# Patient Record
Sex: FEMALE | Race: WHITE | NOT HISPANIC OR LATINO | Employment: OTHER | URBAN - METROPOLITAN AREA
[De-identification: names, ages, dates, MRNs, and addresses within clinical notes are randomized per-mention and may not be internally consistent; named-entity substitution may affect disease eponyms.]

---

## 2017-05-09 ENCOUNTER — TELEPHONE (OUTPATIENT)
Dept: FAMILY MEDICINE | Facility: OTHER | Age: 62
End: 2017-05-09

## 2017-05-09 NOTE — TELEPHONE ENCOUNTER
Summary:    Patient is due/failing the following:   FIT Test     Action needed:   Complete a FIT test     Type of outreach:    phone patient would like to complete FIT test in November     Questions for provider review:    None                                                                                                                                    Tiera Hirsch       Chart routed to Care Team .        Panel Management Review      Patient has the following on her problem list:     Hypertension   Last three blood pressure readings:  BP Readings from Last 3 Encounters:   11/08/16 126/70   05/25/16 110/80   10/27/15 126/74     Blood pressure: Passed    HTN Guidelines:  Age 18-59 BP range:  Less than 140/90  Age 60-85 with Diabetes:  Less than 140/90  Age 60-85 without Diabetes:  less than 150/90      Composite cancer screening  Chart review shows that this patient is due/due soon for the following Fecal Colorectal (FIT)

## 2017-08-08 DIAGNOSIS — E87.6 HYPOKALEMIA: Primary | ICD-10-CM

## 2017-08-08 NOTE — TELEPHONE ENCOUNTER
Potassium chloride      Last Written Prescription Date: 11/08/16  Last Fill Quantity: 180, # refills: 3  Last Office Visit with [facility], [facility] or [facility] prescribing provider: 11/08/16       Potassium   Date Value Ref Range Status   11/08/2016 3.6 3.4 - 5.3 mmol/L Final     Creatinine   Date Value Ref Range Status   11/08/2016 0.95 0.52 - 1.04 mg/dL Final     BP Readings from Last 3 Encounters:   11/08/16 126/70   05/25/16 110/80   10/27/15 126/74

## 2017-08-09 RX ORDER — POTASSIUM CHLORIDE 1500 MG/1
TABLET, EXTENDED RELEASE ORAL
Qty: 180 TABLET | Refills: 0 | Status: SHIPPED | OUTPATIENT
Start: 2017-08-09 | End: 2017-11-10

## 2017-09-25 ENCOUNTER — TELEPHONE (OUTPATIENT)
Dept: FAMILY MEDICINE | Facility: OTHER | Age: 62
End: 2017-09-25

## 2017-09-25 NOTE — TELEPHONE ENCOUNTER
Reason for Call:  Medication or medication refill:    Do you use a [facility]?  Name of the [facility]    Name of the medication requested: premarin    Other request: having hot flashes and night sweats and would to go back on this before her appt if possible    Can we leave a detailed message on this number? YES    Phone number patient can be reached at: Home number on file [phone] (home)    Best Time: any    Call taken on 9/25/2017 at 12:20 PM by Daja Rodarte

## 2017-09-25 NOTE — TELEPHONE ENCOUNTER
Left message for patient to call back. Please see message below.    Premarin not on med list or in historical medications. Would need to discuss with TC at appointment. Or ask provider who rx'd this for her.  Deepa Hamilton, CMA

## 2017-09-25 NOTE — TELEPHONE ENCOUNTER
Pt returned the call and reports that dr montes from the [facility] prescribed it for her in 2008.  0.625mg tab.

## 2017-09-26 ENCOUNTER — MYC MEDICAL ADVICE (OUTPATIENT)
Dept: OBGYN | Facility: OTHER | Age: 62
End: 2017-09-26

## 2017-09-26 DIAGNOSIS — N95.1 MENOPAUSAL SYNDROME (HOT FLASHES): Primary | ICD-10-CM

## 2017-09-26 NOTE — TELEPHONE ENCOUNTER
Per protocol pt needs f/u since it has been 11 years. [employer] message was sent to pt.   Kyra Barrera MA  September 26, 2017

## 2017-11-09 ENCOUNTER — MYC MEDICAL ADVICE (OUTPATIENT)
Dept: FAMILY MEDICINE | Facility: OTHER | Age: 62
End: 2017-11-09

## 2017-11-09 DIAGNOSIS — Z12.39 BREAST CANCER SCREENING: Primary | ICD-10-CM

## 2017-11-09 NOTE — TELEPHONE ENCOUNTER
Will route to covering providers to review/sign pended order for mammogram, if appropriate.     I will inform patient once this has been done.  Deepa Hamilton, CMA

## 2017-11-10 ENCOUNTER — TELEPHONE (OUTPATIENT)
Dept: FAMILY MEDICINE | Facility: OTHER | Age: 62
End: 2017-11-10

## 2017-11-10 ENCOUNTER — MYC MEDICAL ADVICE (OUTPATIENT)
Dept: FAMILY MEDICINE | Facility: OTHER | Age: 62
End: 2017-11-10

## 2017-11-10 DIAGNOSIS — E87.6 HYPOKALEMIA: ICD-10-CM

## 2017-11-10 DIAGNOSIS — I10 ESSENTIAL HYPERTENSION WITH GOAL BLOOD PRESSURE LESS THAN 140/90: ICD-10-CM

## 2017-11-10 DIAGNOSIS — N95.1 MENOPAUSAL SYNDROME (HOT FLASHES): ICD-10-CM

## 2017-11-14 RX ORDER — POTASSIUM CHLORIDE 1500 MG/1
TABLET, EXTENDED RELEASE ORAL
Qty: 60 TABLET | Refills: 0 | Status: SHIPPED | OUTPATIENT
Start: 2017-11-14 | End: 2017-11-15

## 2017-11-14 RX ORDER — ATENOLOL AND CHLORTHALIDONE TABLET 50; 25 MG/1; MG/1
TABLET ORAL
Qty: 30 TABLET | Refills: 0 | Status: SHIPPED | OUTPATIENT
Start: 2017-11-14 | End: 2017-11-15

## 2017-11-14 NOTE — TELEPHONE ENCOUNTER
Potassium & Atenolol:  Medication is being filled for 1 time refill only due to:  Patient needs to be seen because it has been more than one year since last visit.    Nathalia Dailey, RN, BSN

## 2017-11-15 ENCOUNTER — MYC MEDICAL ADVICE (OUTPATIENT)
Dept: FAMILY MEDICINE | Facility: OTHER | Age: 62
End: 2017-11-15

## 2017-11-15 DIAGNOSIS — E87.6 HYPOKALEMIA: ICD-10-CM

## 2017-11-15 DIAGNOSIS — I10 ESSENTIAL HYPERTENSION WITH GOAL BLOOD PRESSURE LESS THAN 140/90: ICD-10-CM

## 2017-11-15 RX ORDER — ATENOLOL AND CHLORTHALIDONE TABLET 50; 25 MG/1; MG/1
TABLET ORAL
Qty: 90 TABLET | Refills: 0 | Status: SHIPPED | OUTPATIENT
Start: 2017-11-15 | End: 2018-01-12

## 2017-11-15 RX ORDER — POTASSIUM CHLORIDE 1500 MG/1
20 TABLET, EXTENDED RELEASE ORAL 2 TIMES DAILY
Qty: 180 TABLET | Refills: 0 | Status: SHIPPED | OUTPATIENT
Start: 2017-11-15 | End: 2018-01-12

## 2017-11-15 NOTE — TELEPHONE ENCOUNTER
Patient was given 3 months worth of a refills, this will get her till her appointment. Ruth Navarro RN, BSN

## 2017-12-20 ENCOUNTER — MYC MEDICAL ADVICE (OUTPATIENT)
Dept: FAMILY MEDICINE | Facility: OTHER | Age: 62
End: 2017-12-20

## 2017-12-20 DIAGNOSIS — N95.1 MENOPAUSAL SYNDROME (HOT FLASHES): ICD-10-CM

## 2017-12-20 NOTE — TELEPHONE ENCOUNTER
Prescription approved per [facility] Refill Protocol.    Nathalia Dailey, RN, BSN

## 2018-01-12 ENCOUNTER — OFFICE VISIT (OUTPATIENT)
Dept: FAMILY MEDICINE | Facility: OTHER | Age: 63
End: 2018-01-12
Payer: COMMERCIAL

## 2018-01-12 VITALS
WEIGHT: 134 LBS | SYSTOLIC BLOOD PRESSURE: 138 MMHG | HEART RATE: 55 BPM | BODY MASS INDEX: 24.66 KG/M2 | DIASTOLIC BLOOD PRESSURE: 72 MMHG | OXYGEN SATURATION: 97 % | TEMPERATURE: 99 F | HEIGHT: 62 IN | RESPIRATION RATE: 16 BRPM

## 2018-01-12 DIAGNOSIS — Z12.11 SCREEN FOR COLON CANCER: ICD-10-CM

## 2018-01-12 DIAGNOSIS — N95.1 MENOPAUSAL SYNDROME (HOT FLASHES): ICD-10-CM

## 2018-01-12 DIAGNOSIS — M54.50 BILATERAL LOW BACK PAIN WITHOUT SCIATICA, UNSPECIFIED CHRONICITY: ICD-10-CM

## 2018-01-12 DIAGNOSIS — M77.01 MEDIAL EPICONDYLITIS OF ELBOW, RIGHT: ICD-10-CM

## 2018-01-12 DIAGNOSIS — I10 ESSENTIAL HYPERTENSION WITH GOAL BLOOD PRESSURE LESS THAN 140/90: ICD-10-CM

## 2018-01-12 DIAGNOSIS — Z00.00 ENCOUNTER FOR ROUTINE ADULT HEALTH EXAMINATION WITHOUT ABNORMAL FINDINGS: Primary | ICD-10-CM

## 2018-01-12 LAB
ANION GAP SERPL CALCULATED.3IONS-SCNC: 7 MMOL/L (ref 3–14)
BUN SERPL-MCNC: 20 MG/DL (ref 7–30)
CALCIUM SERPL-MCNC: 8.6 MG/DL (ref 8.5–10.1)
CHLORIDE SERPL-SCNC: 104 MMOL/L (ref 94–109)
CHOLEST SERPL-MCNC: 234 MG/DL
CO2 SERPL-SCNC: 27 MMOL/L (ref 20–32)
CREAT SERPL-MCNC: 0.75 MG/DL (ref 0.52–1.04)
GFR SERPL CREATININE-BSD FRML MDRD: 77 ML/MIN/1.7M2
GLUCOSE SERPL-MCNC: 83 MG/DL (ref 70–99)
HDLC SERPL-MCNC: 100 MG/DL
LDLC SERPL CALC-MCNC: 103 MG/DL
NONHDLC SERPL-MCNC: 134 MG/DL
POTASSIUM SERPL-SCNC: 3.5 MMOL/L (ref 3.4–5.3)
SODIUM SERPL-SCNC: 138 MMOL/L (ref 133–144)
TRIGL SERPL-MCNC: 156 MG/DL

## 2018-01-12 PROCEDURE — 99396 PREV VISIT EST AGE 40-64: CPT | Performed by: FAMILY MEDICINE

## 2018-01-12 PROCEDURE — 80048 BASIC METABOLIC PNL TOTAL CA: CPT | Performed by: FAMILY MEDICINE

## 2018-01-12 PROCEDURE — 36415 COLL VENOUS BLD VENIPUNCTURE: CPT | Performed by: FAMILY MEDICINE

## 2018-01-12 PROCEDURE — 80061 LIPID PANEL: CPT | Performed by: FAMILY MEDICINE

## 2018-01-12 PROCEDURE — 99213 OFFICE O/P EST LOW 20 MIN: CPT | Mod: 25 | Performed by: FAMILY MEDICINE

## 2018-01-12 RX ORDER — ESTRADIOL 0.5 MG/1
0.5 TABLET ORAL DAILY
Qty: 90 TABLET | Refills: 3 | Status: SHIPPED | OUTPATIENT
Start: 2018-01-12 | End: 2018-02-16

## 2018-01-12 RX ORDER — ATENOLOL AND CHLORTHALIDONE TABLET 50; 25 MG/1; MG/1
TABLET ORAL
Qty: 90 TABLET | Refills: 3 | Status: SHIPPED | OUTPATIENT
Start: 2018-01-12 | End: 2018-11-20

## 2018-01-12 RX ORDER — POTASSIUM CHLORIDE 1500 MG/1
20 TABLET, EXTENDED RELEASE ORAL 3 TIMES DAILY
Qty: 270 TABLET | Refills: 3 | Status: SHIPPED | OUTPATIENT
Start: 2018-01-12 | End: 2019-03-01

## 2018-01-12 ASSESSMENT — ENCOUNTER SYMPTOMS
BACK PAIN: 1
JOINT SWELLING: 0
HEMATOCHEZIA: 0
CONSTIPATION: 0
HEADACHES: 0
PARESTHESIAS: 0
COUGH: 0
MYALGIAS: 0
HEARTBURN: 0
DIZZINESS: 0
FEVER: 0
WEAKNESS: 0
PALPITATIONS: 0
ARTHRALGIAS: 1
SHORTNESS OF BREATH: 0
DYSURIA: 0
HEMATURIA: 0
NAUSEA: 0
FREQUENCY: 0
ABDOMINAL PAIN: 0
DIARRHEA: 0
NERVOUS/ANXIOUS: 0
SORE THROAT: 0
EYE PAIN: 0
CHILLS: 0

## 2018-01-12 NOTE — PROGRESS NOTES
SUBJECTIVE:   CC: Christiana Lopes is an 62 year old woman who presents for preventive health visit.     Physical   Annual:     Getting at least 3 servings of Calcium per day::  Yes    Bi-annual eye exam::  NO    Dental care twice a year::  Yes    Sleep apnea or symptoms of sleep apnea::  None    Diet::  Regular (no restrictions)    Frequency of exercise::  2-3 days/week    Duration of exercise::  45-60 minutes    Taking medications regularly::  Yes    Medication side effects::  None    Additional concerns today::  YES          Patient would also like to discuss getting a colonoscopy, low potassium, hormonal issues including hot flashes and night sweats, restarting Premarin, elbow pain, eyelid issues, back pain.     Today's PHQ-2 Score:   PHQ-2 ( 1999 [employer]) 1/12/2018   Q1: Little interest or pleasure in doing things 0   Q2: Feeling down, depressed or hopeless 0   PHQ-2 Score 0   Q1: Little interest or pleasure in doing things Not at all   Q2: Feeling down, depressed or hopeless Not at all   PHQ-2 Score 0   Answers for HPI/ROS submitted by the patient on 1/12/2018   PHQ-2 Score: 0    Abuse: Current or Past(Physical, Sexual or Emotional)- No  Do you feel safe in your environment - Yes    Social History   Substance Use Topics     Smoking status: Former Smoker     Packs/day: 1.00     Years: 2.00     Types: Cigarettes     Quit date: 8/1/2010     Smokeless tobacco: Never Used      Comment: no use of tobacco products     Alcohol use Yes      Comment: socially     Alcohol Use 1/12/2018   If you drink alcohol, do you typically have greater than 3 drinks per day OR greater than 7 drinks per week?   No   No flowsheet data found.    Reviewed orders with patient.  Reviewed health maintenance and updated orders accordingly - Yes      Patient over age 50, mutual decision to screen reflected in health maintenance.      Pertinent mammograms are reviewed under the imaging tab.  History of abnormal Pap smear: NO - age 30- 65 PAP  every 3 years recommended    Reviewed and updated as needed this visit by clinical staffTobacco  Allergies  Meds  Problems  Med Hx  Surg Hx  Fam Hx  Soc Hx          Reviewed and updated as needed this visit by Provider  Allergies  Meds  Problems          Review of Systems   Constitutional: Negative for chills and fever.   HENT: Negative for congestion, ear pain, hearing loss and sore throat.    Eyes: Negative for pain and visual disturbance.        She complains of her upper eyelids interfering with some of her peripheral vision.   Respiratory: Negative for cough and shortness of breath.    Cardiovascular: Negative for chest pain, palpitations and peripheral edema.   Gastrointestinal: Negative for abdominal pain, constipation, diarrhea, heartburn, hematochezia and nausea.   Genitourinary: Negative for dysuria, frequency, genital sores, hematuria, pelvic pain, urgency, vaginal bleeding and vaginal discharge.        She complains of hot flashes and night sweats ongoing for over 10 years.  States she had been given Premarin 10 years ago, but didn't want to take it as she didn't agree with how the ingredients were collected.  But she got tired of the hot flashes, so started taking old pills she had left and her symptoms improved, so she called the clinic for a refill, one of my colleagues had refilled it for her until she could be seen which was in November, but then that was canceled and her appointment was rescheduled in January.   Musculoskeletal: Positive for arthralgias (She complains of several months or right medial elbow pain.  She wonders if holding the reins while riding her horse for increased distances has contirbuted to  the pain.) and back pain (She complains of bilateral lower back back without radiation ongoing for about a year.  During this time she has been  doigng longer distance horse riding.). Negative for joint swelling and myalgias.   Skin: Negative for rash.   Neurological: Negative for  "dizziness, weakness, headaches and paresthesias.   Psychiatric/Behavioral: Negative for mood changes. The patient is not nervous/anxious.         OBJECTIVE:   /72 (BP Location: Left arm, Patient Position: Chair, Cuff Size: Adult Regular)  Pulse 55  Temp 99  F (37.2  C) (Temporal)  Resp 16  Ht 5' 1.5\" (1.562 m)  Wt 134 lb (60.8 kg)  SpO2 97%  BMI 24.91 kg/m2  Physical Exam   Constitutional: She is oriented to person, place, and time. She appears well-developed and well-nourished. No distress.   HENT:   Right Ear: External ear normal.   Left Ear: External ear normal.   Nose: Nose normal.   Mouth/Throat: Oropharynx is clear and moist. No oropharyngeal exudate.   Eyes: Conjunctivae are normal. Pupils are equal, round, and reactive to light. Right eye exhibits no discharge. Left eye exhibits no discharge.   Bilateral upper eyelids are sagging.   Neck: Neck supple. No tracheal deviation present. No thyromegaly present.   Cardiovascular: Normal rate, regular rhythm, S1 normal, S2 normal, normal heart sounds and normal pulses.  Exam reveals no S3, no S4 and no friction rub.    No murmur heard.  Pulmonary/Chest: Effort normal and breath sounds normal. No respiratory distress. She has no wheezes. She has no rales.   Abdominal: Soft. Bowel sounds are normal. She exhibits no mass. There is no hepatosplenomegaly. There is no tenderness.   Genitourinary: No breast swelling, tenderness or discharge.   Musculoskeletal: She exhibits no edema.        Right elbow: She exhibits normal range of motion, no swelling, no effusion and no deformity. Tenderness found. Medial epicondyle tenderness noted.        Lumbar back: She exhibits tenderness (Bilateral paralumbar musculature). She exhibits no bony tenderness.   Lymphadenopathy:     She has no cervical adenopathy.   Neurological: She is alert and oriented to person, place, and time. She has normal strength and normal reflexes. She exhibits normal muscle tone.   Skin: Skin is " warm and dry. No rash noted.   Psychiatric: She has a normal mood and affect. Judgment and thought content normal. Cognition and memory are normal.       ASSESSMENT/PLAN:   1. Encounter for routine adult health examination without abnormal findings    2. Essential hypertension with goal blood pressure less than 140/90  Well controlled.  Had been recommended to take potassium three times daily, but only willing to take twice daily, then noticed weakness and muscle spasms so a month ago increased her potassium to three times daily and her symptoms resolved, will check BMP today.    - atenolol-chlorthalidone (TENORETIC) 50-25 MG per tablet; TAKE ONE-HALF (1/2) TABLET TWICE A DAY  Dispense: 90 tablet; Refill: 3  - Potassium Chloride ER 20 MEQ TBCR; Take 1 tablet (20 mEq) by mouth 3 times daily  Dispense: 270 tablet; Refill: 3  - Basic metabolic panel  - Lipid panel reflex to direct LDL Non-fasting    3. Menopausal syndrome (hot flashes)  Restarted a couple months ago after being off of it for 10 years and then only briefly taking it.  Did have resolution of her hot flashes and night sweats.  Discussed risks of breast cancer, stroke, CAD.  Will switch to Estrace, had consider transdermal, but she states that she has had difficulty with skin issues from other medication patches in the past.  Will try to limit her use to less than 5 years.    - estradiol (ESTRACE) 0.5 MG tablet; Take 1 tablet (0.5 mg) by mouth daily  Dispense: 90 tablet; Refill: 3    4. Bilateral low back pain without sciatica, unspecified chronicity  No neurologic objective findings, suspect this is musculoskeletal, will try Physical Therapy.     - PHYSICAL THERAPY REFERRAL    5. Medial epicondylitis of elbow, right  Will refer to Physical Therapy.    - PHYSICAL THERAPY REFERRAL    6. Screen for colon cancer    - GASTROENTEROLOGY ADULT REF PROCEDURE ONLY    COUNSELING:  Reviewed preventive health counseling, as reflected in patient instructions    BP  "Screening:   Last 3 BP Readings:    BP Readings from Last 3 Encounters:   01/12/18 138/72   11/08/16 126/70   05/25/16 110/80       The following was recommended to the patient:  Re-screen BP within a year and recommended lifestyle modifications     reports that she quit smoking about 7 years ago. Her smoking use included Cigarettes. She has a 2.00 pack-year smoking history. She has never used smokeless tobacco.    Estimated body mass index is 24.91 kg/(m^2) as calculated from the following:    Height as of this encounter: 5' 1.5\" (1.562 m).    Weight as of this encounter: 134 lb (60.8 kg).         Counseling Resources:  ATP IV Guidelines  Pooled Cohorts Equation Calculator  Breast Cancer Risk Calculator  FRAX Risk Assessment  ICSI Preventive Guidelines  Dietary Guidelines for Americans, 2010  [employer] MyPlate  ASA Prophylaxis  Lung CA Screening    Jigna Lees MD  [facility]  The 10-year ASCVD risk score (Buffaloirma GRESHAM Jr, et al., 2013) is: 7.9%    Values used to calculate the score:      Age: 62 years      Sex: Female      Is Non- : No      Diabetic: No      Tobacco smoker: No      Systolic Blood Pressure: 138 mmHg      Is BP treated: Yes      HDL Cholesterol: 76 mg/dL      Total Cholesterol: 268 mg/dL estradio  "

## 2018-01-12 NOTE — MR AVS SNAPSHOT
After Visit Summary   1/12/2018    Christiana Lopes    MRN: [id number]           Patient Information     Date Of Birth          1955        Visit Information        Provider Department      1/12/2018 2:20 PM Jigna Lees MD [facility]        Today's Diagnoses     Encounter for routine adult health examination without abnormal findings    -  1    Essential hypertension with goal blood pressure less than 140/90        Menopausal syndrome (hot flashes)        Bilateral low back pain without sciatica, unspecified chronicity        Medial epicondylitis of elbow, right        Screen for colon cancer           Follow-ups after your visit        Additional Services     GASTROENTEROLOGY ADULT REF PROCEDURE ONLY       Last Lab Result: Creatinine (mg/dL)       Date                     Value                 11/08/2016               0.95             ----------  Body mass index is 24.91 kg/(m^2).      Patient will be contacted to schedule procedure.     Please be aware that coverage of these services is subject to the terms and limitations of your health insurance plan.  Call member services at your health plan with any benefit or coverage questions.  Any procedures must be performed at a [place] facility OR coordinated by your clinic's referral office.    Please bring the following with you to your appointment:    (1) Any X-Rays, CTs or MRIs which have been performed.  Contact the facility where they were done to arrange for  prior to your scheduled appointment.    (2) List of current medications   (3) This referral request   (4) Any documents/labs given to you for this referral            PHYSICAL THERAPY REFERRAL       *This therapy referral will be filtered to a centralized scheduling office at [facility] and the patient will receive a call to schedule an appointment at a [place] location most convenient for them. *     [facility]  "Services provides Physical Therapy evaluation and treatment and many specialty services across the [employer].  If requesting a specialty program, please choose from the list below.    If you have not heard from the scheduling office within 2 business days, please call [phone] for all locations, with the exception of [place], please call [phone].  Treatment: Evaluation & Treatment  Special Instructions/Modalities:   Special Programs:     Please be aware that coverage of these services is subject to the terms and limitations of your health insurance plan.  Call member services at your health plan with any benefit or coverage questions.      **Note to Provider:  If you are referring outside of [place] for the therapy appointment, please list the name of the location in the \"special instructions\" above, print the referral and give to the patient to schedule the appointment.            PHYSICAL THERAPY REFERRAL       *This therapy referral will be filtered to a centralized scheduling office at [facility] and the patient will receive a call to schedule an appointment at a [place] location most convenient for them. *     [facility] provides Physical Therapy evaluation and treatment and many specialty services across the [employer].  If requesting a specialty program, please choose from the list below.    If you have not heard from the scheduling office within 2 business days, please call [phone] for all locations, with the exception of [place], please call [phone].  Treatment: Evaluation & Treatment  Special Instructions/Modalities:   Special Programs:     Please be aware that coverage of these services is subject to the terms and limitations of your health insurance plan.  Call member services at your health plan with any benefit or coverage questions.      **Note to Provider:  If you are referring outside of [place] for the therapy appointment, " "please list the name of the location in the \"special instructions\" above, print the referral and give to the patient to schedule the appointment.                  Who to contact     If you have questions or need follow up information about today's clinic visit or your schedule please contact [facility] directly at [phone].  Normal or non-critical lab and imaging results will be communicated to you by MyChart, letter or phone within 4 business days after the clinic has received the results. If you do not hear from us within 7 days, please contact the clinic through [employer] or phone. If you have a critical or abnormal lab result, we will notify you by phone as soon as possible.  Submit refill requests through [employer] or call your pharmacy and they will forward the refill request to us. Please allow 3 business days for your refill to be completed.          Additional Information About Your Visit        [employer] Information     [employer] gives you secure access to your electronic health record. If you see a primary care provider, you can also send messages to your care team and make appointments. If you have questions, please call your primary care clinic.  If you do not have a primary care provider, please call [phone] and they will assist you.        Care EveryWhere ID     This is your Care EveryWhere ID. This could be used by other organizations to access your [place] medical records  [id number]        Your Vitals Were     Pulse Temperature Respirations Height Pulse Oximetry BMI (Body Mass Index)    55 99  F (37.2  C) (Temporal) 16 5' 1.5\" (1.562 m) 97% 24.91 kg/m2       Blood Pressure from Last 3 Encounters:   01/12/18 138/72   11/08/16 126/70   05/25/16 110/80    Weight from Last 3 Encounters:   01/12/18 134 lb (60.8 kg)   11/08/16 139 lb (63 kg)   05/25/16 134 lb (60.8 kg)              We Performed the Following     Basic metabolic panel     GASTROENTEROLOGY ADULT REF PROCEDURE ONLY     " Lipid panel reflex to direct LDL Non-fasting     PHYSICAL THERAPY REFERRAL     PHYSICAL THERAPY REFERRAL          Today's Medication Changes          These changes are accurate as of: 1/12/18  3:12 PM.  If you have any questions, ask your nurse or doctor.               Start taking these medicines.        Dose/Directions    estradiol 0.5 MG tablet   Commonly known as:  ESTRACE   Used for:  Menopausal syndrome (hot flashes)   Started by:  Jigna Lees MD        Dose:  0.5 mg   Take 1 tablet (0.5 mg) by mouth daily   Quantity:  90 tablet   Refills:  3         These medicines have changed or have updated prescriptions.        Dose/Directions    Potassium Chloride ER 20 MEQ Tbcr   This may have changed:  when to take this   Used for:  Essential hypertension with goal blood pressure less than 140/90   Changed by:  Jigna Lees MD        Dose:  20 mEq   Take 1 tablet (20 mEq) by mouth 3 times daily   Quantity:  270 tablet   Refills:  3            Where to get your medicines      Some of these will need a paper prescription and others can be bought over the counter.  Ask your nurse if you have questions.     Bring a paper prescription for each of these medications     atenolol-chlorthalidone 50-25 MG per tablet    estradiol 0.5 MG tablet    Potassium Chloride ER 20 MEQ Tbcr                Primary Care Provider Office Phone # Fax #    Jigna Lees [phone]       [address] [place] [id number]        Equal Access to Services     RONNI JONES AH: Sierra howello Soflower, waaxda luqadaha, qaybta kaalmada adeesme, chanel feng. So [facility] [phone].    ATENCIÓN: Si habla español, tiene a zamora disposición servicios gratuitos de asistencia lingüística. Llame al [phone].    We comply with applicable federal civil rights laws and [place] laws. We do not discriminate on the basis of race, color, national origin, age, disability, sex,  sexual orientation, or gender identity.            Thank you!     Thank you for choosing [facility]  for your care. Our goal is always to provide you with excellent care. Hearing back from our patients is one way we can continue to improve our services. Please take a few minutes to complete the written survey that you may receive in the mail after your visit with us. Thank you!             Your Updated Medication List - Protect others around you: Learn how to safely use, store and throw away your medicines at www.disposemymeds.org.          This list is accurate as of: 1/12/18  3:12 PM.  Always use your most recent med list.                   Brand Name Dispense Instructions for use Diagnosis    ALEVE 220 MG capsule   Generic drug:  naproxen sodium      Take 220 mg by mouth daily.        atenolol-chlorthalidone 50-25 MG per tablet    TENORETIC    90 tablet    TAKE ONE-HALF (1/2) TABLET TWICE A DAY    Essential hypertension with goal blood pressure less than 140/90       [facility] 150 MG Caps   Generic drug:  [facility] Wort     90 capsule    Take  by mouth.        estradiol 0.5 MG tablet    ESTRACE    90 tablet    Take 1 tablet (0.5 mg) by mouth daily    Menopausal syndrome (hot flashes)       estrogens (conjugated) 0.625 MG tablet    PREMARIN    30 tablet    Take 1 tablet (0.625 mg) by mouth daily    Menopausal syndrome (hot flashes)       fish oil-omega-3 fatty acids 1000 MG capsule      Take 2 g by mouth daily.        GLUCOSAMINE HCL-MSM PO      DAILY        MULTIVITAMIN PO      Take 1 tablet by mouth daily.        Potassium Chloride ER 20 MEQ Tbcr     270 tablet    Take 1 tablet (20 mEq) by mouth 3 times daily    Essential hypertension with goal blood pressure less than 140/90

## 2018-01-12 NOTE — NURSING NOTE
"Chief Complaint   Patient presents with     Physical       Initial /72 (BP Location: Left arm, Patient Position: Chair, Cuff Size: Adult Regular)  Pulse 55  Temp 99  F (37.2  C) (Temporal)  Resp 16  Ht 5' 1.5\" (1.562 m)  Wt 134 lb (60.8 kg)  SpO2 97%  BMI 24.91 kg/m2 Estimated body mass index is 24.91 kg/(m^2) as calculated from the following:    Height as of this encounter: 5' 1.5\" (1.562 m).    Weight as of this encounter: 134 lb (60.8 kg).  Medication Reconciliation: complete   Niurka Matta CMA      "

## 2018-01-15 ENCOUNTER — TELEPHONE (OUTPATIENT)
Dept: FAMILY MEDICINE | Facility: OTHER | Age: 63
End: 2018-01-15

## 2018-01-18 NOTE — TELEPHONE ENCOUNTER
Left message for patient to return call to schedule colonoscopy or EGD. If Zakia or Charisse are unavailable, please transfer to the surgery center.     OK to schedule with NERISSA or Gabriele

## 2018-01-24 ENCOUNTER — TELEPHONE (OUTPATIENT)
Dept: SURGERY | Facility: CLINIC | Age: 63
End: 2018-01-24

## 2018-01-24 NOTE — TELEPHONE ENCOUNTER
Pt called to schedule colonoscopy. Orders placed by Elissa. Dx: screen for colon cancer. PT is sched 2/12/2018 @ 1000, arriving @ 900 w/Marv. Please mail prep

## 2018-02-12 ENCOUNTER — SURGERY (OUTPATIENT)
Age: 63
End: 2018-02-12

## 2018-02-12 ENCOUNTER — HOSPITAL ENCOUNTER (OUTPATIENT)
Facility: CLINIC | Age: 63
Discharge: HOME | End: 2018-02-12
Attending: INTERNAL MEDICINE | Admitting: INTERNAL MEDICINE
Payer: COMMERCIAL

## 2018-02-12 VITALS
OXYGEN SATURATION: 96 % | TEMPERATURE: 98.1 F | HEART RATE: 59 BPM | DIASTOLIC BLOOD PRESSURE: 90 MMHG | SYSTOLIC BLOOD PRESSURE: 132 MMHG | RESPIRATION RATE: 16 BRPM

## 2018-02-12 LAB — COLONOSCOPY: NORMAL

## 2018-02-12 PROCEDURE — 45378 DIAGNOSTIC COLONOSCOPY: CPT | Performed by: INTERNAL MEDICINE

## 2018-02-12 PROCEDURE — G0121 COLON CA SCRN NOT HI RSK IND: HCPCS | Performed by: INTERNAL MEDICINE

## 2018-02-12 PROCEDURE — 25000125 ZZHC RX 250: Performed by: INTERNAL MEDICINE

## 2018-02-12 PROCEDURE — G0500 MOD SEDAT ENDO SERVICE >5YRS: HCPCS

## 2018-02-12 PROCEDURE — 40000296 ZZH STATISTIC ENDO RECOVERY CLASS 1:2 FIRST HOUR: Performed by: INTERNAL MEDICINE

## 2018-02-12 PROCEDURE — 25000128 H RX IP 250 OP 636: Performed by: INTERNAL MEDICINE

## 2018-02-12 RX ORDER — FENTANYL CITRATE 50 UG/ML
INJECTION, SOLUTION INTRAMUSCULAR; INTRAVENOUS PRN
Status: DISCONTINUED | OUTPATIENT
Start: 2018-02-12 | End: 2018-02-12 | Stop reason: HOSPADM

## 2018-02-12 RX ORDER — LIDOCAINE 40 MG/G
CREAM TOPICAL
Status: DISCONTINUED | OUTPATIENT
Start: 2018-02-12 | End: 2018-02-12 | Stop reason: HOSPADM

## 2018-02-12 RX ORDER — ONDANSETRON 2 MG/ML
4 INJECTION INTRAMUSCULAR; INTRAVENOUS
Status: DISCONTINUED | OUTPATIENT
Start: 2018-02-12 | End: 2018-02-12 | Stop reason: HOSPADM

## 2018-02-12 RX ADMIN — MIDAZOLAM 2 MG: 1 INJECTION INTRAMUSCULAR; INTRAVENOUS at 10:42

## 2018-02-12 RX ADMIN — MIDAZOLAM 1 MG: 1 INJECTION INTRAMUSCULAR; INTRAVENOUS at 10:43

## 2018-02-12 RX ADMIN — LIDOCAINE HYDROCHLORIDE 1 ML: 10 INJECTION, SOLUTION EPIDURAL; INFILTRATION; INTRACAUDAL; PERINEURAL at 09:34

## 2018-02-12 RX ADMIN — MIDAZOLAM 1 MG: 1 INJECTION INTRAMUSCULAR; INTRAVENOUS at 10:44

## 2018-02-12 RX ADMIN — FENTANYL CITRATE 25 MCG: 50 INJECTION, SOLUTION INTRAMUSCULAR; INTRAVENOUS at 10:57

## 2018-02-12 RX ADMIN — FENTANYL CITRATE 50 MCG: 50 INJECTION, SOLUTION INTRAMUSCULAR; INTRAVENOUS at 10:42

## 2018-02-12 RX ADMIN — FENTANYL CITRATE 25 MCG: 50 INJECTION, SOLUTION INTRAMUSCULAR; INTRAVENOUS at 10:45

## 2018-02-12 RX ADMIN — MIDAZOLAM 1 MG: 1 INJECTION INTRAMUSCULAR; INTRAVENOUS at 10:45

## 2018-02-12 NOTE — CONSULTS
[facility] GI Pre-Procedure Physical Assessment    Christiana Lopes MRN# [id number]   Age: 62 year old YOB: 1955      Date of Surgery: 2/12/2018  Location [facility]      Date of Exam 2/12/2018 Facility (Same day)       Home clinic: [facility]  Primary care provider: Jigna Lees         Active problem list:   Patient Active Problem List   Diagnosis     Hypertension goal BP (blood pressure) < 140/90     Advanced directives, counseling/discussion     History of cervical cancer            Medications (include herbals and vitamins):   Any Plavix use in the last 7 days?  No     Current Facility-Administered Medications   Medication     lidocaine 1 % 1 mL     lidocaine (LMX4) kit     sodium chloride (PF) 0.9% PF flush 3 mL     sodium chloride (PF) 0.9% PF flush 3 mL     ondansetron (ZOFRAN) injection 4 mg             Allergies:    No Known Allergies  Allergy to Latex?  No  Allergy to tape?    No          Social History:     Social History   Substance Use Topics     Smoking status: Former Smoker     Packs/day: 1.00     Years: 2.00     Types: Cigarettes     Quit date: 8/1/2010     Smokeless tobacco: Never Used      Comment: no use of tobacco products     Alcohol use Yes      Comment: socially            Physical Exam:   All vitals have been reviewed  Blood pressure 143/82, pulse 59, temperature 98.1  F (36.7  C), temperature source Oral, resp. rate 16, SpO2 97 %.  Airway assessment:   Patient is able to open mouth wide  Patient is able to stick out tongue      Lungs:   No increased work of breathing, good air exchange, clear to auscultation bilaterally, no crackles or wheezing      Cardiovascular:   Normal apical impulse, regular rate and rhythm, normal S1 and S2, no S3 or S4, and no murmur noted           Lab / Radiology Results:   All laboratory data reviewed          Assessment:   Appropriately NPO  Chief complaint or anatomic assessment of involved area:  screening         Plan:   Moderate (conscious) sedation     Patient's active problems diagnostically and therapeutically optimized for the planned procedure  Risks, benefits, alternatives to sedation and blood explained and consent obtained  Risks, benefits, alternatives to procedure explained and consent obtained  Orders and progress notes are in the chart  Discharge from Phase 1 and / or Phase 2 recovery when patient meets criteria    I have reviewed the history and physical, lab finding(s), diagnostic data, medicaitons, and the plan for sedation.  I have determined this patient to be an appropriate candidate for the planned sedation / procedure and have reassessed the patient immediately prior to sedation / procedure.    I have personally and medically directed the administration of medications used.    Ken Fair MD

## 2018-02-15 ENCOUNTER — MYC MEDICAL ADVICE (OUTPATIENT)
Dept: FAMILY MEDICINE | Facility: OTHER | Age: 63
End: 2018-02-15

## 2018-02-15 DIAGNOSIS — N95.1 MENOPAUSAL SYNDROME (HOT FLASHES): ICD-10-CM

## 2018-02-16 RX ORDER — ESTRADIOL 1 MG/1
1 TABLET ORAL DAILY
Qty: 90 TABLET | Refills: 3 | Status: SHIPPED | OUTPATIENT
Start: 2018-02-16 | End: 2019-03-01

## 2018-05-01 ENCOUNTER — OFFICE VISIT (OUTPATIENT)
Dept: OPTOMETRY | Facility: CLINIC | Age: 63
End: 2018-05-01
Payer: COMMERCIAL

## 2018-05-01 DIAGNOSIS — H52.223 REGULAR ASTIGMATISM OF BOTH EYES: Primary | ICD-10-CM

## 2018-05-01 DIAGNOSIS — H52.4 PRESBYOPIA: ICD-10-CM

## 2018-05-01 DIAGNOSIS — H02.833 DERMATOCHALASIS OF EYELIDS OF BOTH EYES: ICD-10-CM

## 2018-05-01 DIAGNOSIS — H02.836 DERMATOCHALASIS OF EYELIDS OF BOTH EYES: ICD-10-CM

## 2018-05-01 PROCEDURE — 92004 COMPRE OPH EXAM NEW PT 1/>: CPT | Performed by: OPTOMETRIST

## 2018-05-01 PROCEDURE — 92015 DETERMINE REFRACTIVE STATE: CPT | Performed by: OPTOMETRIST

## 2018-05-01 ASSESSMENT — REFRACTION_MANIFEST
OS_SPHERE: -1.00
OS_AXIS: 180
OD_AXIS: 166
METHOD_AUTOREFRACTION: 1
OS_ADD: +1.50
OD_SPHERE: -0.75
OD_CYLINDER: +1.50
OS_SPHERE: -0.75
OD_SPHERE: -0.25
OD_AXIS: 180
OD_ADD: +1.50
OS_CYLINDER: +1.75
OD_CYLINDER: +2.00
OS_CYLINDER: +1.50
OS_AXIS: 003

## 2018-05-01 ASSESSMENT — VISUAL ACUITY
OS_PH_SC: 20/30-1
OS_SC: 20/40
METHOD: SNELLEN - LINEAR
OD_SC+: -2
CORRECTION_TYPE: GLASSES
OS_SC: 20/40-2
OD_SC: 20/70-1
OS_SC+: -2
OS_CC: 20/25-2
OD_SC: 20/30
OD_CC: 20/30

## 2018-05-01 ASSESSMENT — EXTERNAL EXAM - RIGHT EYE: OD_EXAM: NORMAL

## 2018-05-01 ASSESSMENT — CONF VISUAL FIELD
OD_NORMAL: 1
METHOD: COUNTING FINGERS

## 2018-05-01 ASSESSMENT — REFRACTION_WEARINGRX
SPECS_TYPE: OTC'S
OS_SPHERE: +1.75
OD_SPHERE: +1.75

## 2018-05-01 ASSESSMENT — KERATOMETRY
OD_K2POWER_DIOPTERS: 44.50
OS_K1POWER_DIOPTERS: 45.75
OD_K1POWER_DIOPTERS: 44.50
OS_AXISANGLE2_DEGREES: 170
OS_K2POWER_DIOPTERS: 44.75
OD_AXISANGLE2_DEGREES: 179

## 2018-05-01 ASSESSMENT — CUP TO DISC RATIO
OD_RATIO: 0.1
OS_RATIO: 0.1

## 2018-05-01 ASSESSMENT — TONOMETRY
OS_IOP_MMHG: 18
IOP_METHOD: APPLANATION
OD_IOP_MMHG: 18

## 2018-05-01 ASSESSMENT — EXTERNAL EXAM - LEFT EYE: OS_EXAM: NORMAL

## 2018-05-01 NOTE — PROGRESS NOTES
Chief Complaint   Patient presents with     COMPREHENSIVE EYE EXAM         Last Eye Exam: 6/1/2009    Dilated Previously: Yes    What are you currently using to see?  Readers, has several pairs. Uses just for reading and the computer as needed . Prefers not to wear glasses , has interest in LASIK       Distance Vision Acuity: Noticed gradual change in both eyes, slight , if any     Near Vision Acuity: Satisfied with vision while reading and using computer with readers and unaided at times     Eye Comfort: good usually, does get a little headache aura from time to time when she's tense or stressed   Do you use eye drops? 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    Patient Aunts macular degeneration one has wet, other dry      OBJECTIVE: See Ophthalmology exam    ASSESSMENT:    ICD-10-CM    1. Regular astigmatism of both eyes H52.223 EYE EXAM (SIMPLE-NONBILLABLE)     REFRACTION   2. Presbyopia H52.4 EYE EXAM (SIMPLE-NONBILLABLE)     REFRACTION   3. Dermatochalasis of eyelids of both eyes H02.833 EYE EXAM (SIMPLE-NONBILLABLE)    H02.836       PLAN:     Patient Instructions   Patient was advised of today's exam findings.  Optional to fill new glasses prescription, mild glasses prescription   Reading glasses advised  See Lico Ma MD for blepharoplasty    Return in 1 year for eye exam    Therese Hancock O.D.  [facility]   [address] [place] [address] [phone]      Lico Ma MD  Ophthalmic Plastic and Reconstructive Surgery  [phone]    [facility]  [address] [place] [address]

## 2018-05-01 NOTE — PATIENT INSTRUCTIONS
Patient was advised of today's exam findings.  Optional to fill new glasses prescription, mild glasses prescription   Reading glasses advised  See Lico Ma MD for blepharoplasty    Return in 1 year for eye exam    Therese Hancock O.D.  [address] [id number] [phone]      Lico Ma MD  Ophthalmic Plastic and Reconstructive Surgery  [phone]    [facility]  [address] [place] [address]

## 2018-05-01 NOTE — MR AVS SNAPSHOT
After Visit Summary   5/1/2018    Christiana Lopes    MRN: [id number]           Patient Information     Date Of Birth          1955        Visit Information        Provider Department      5/1/2018 1:40 PM Therese Hancock, [facility]        Today's Diagnoses     Regular astigmatism of both eyes    -  1    Presbyopia        Dermatochalasis of eyelids of both eyes          Care Instructions    Patient was advised of today's exam findings.  Optional to fill new glasses prescription, mild glasses prescription   Reading glasses advised  See Lico Ma MD for blepharoplasty    Return in 1 year for eye exam    Therese Hancock O.D.  [facility]   [address] [place] [address] [phone]      Lico Ma MD  Ophthalmic Plastic and Reconstructive Surgery  [phone]    [facility]  [address] [place] [address]            Follow-ups after your visit        Who to contact     If you have questions or need follow up information about today's clinic visit or your schedule please contact [facility] directly at [phone].  Normal or non-critical lab and imaging results will be communicated to you by MyChart, letter or phone within 4 business days after the clinic has received the results. If you do not hear from us within 7 days, please contact the clinic through [employer] or phone. If you have a critical or abnormal lab result, we will notify you by phone as soon as possible.  Submit refill requests through [employer] or call your pharmacy and they will forward the refill request to us. Please allow 3 business days for your refill to be completed.          Additional Information About Your Visit        MyChart Information     [employer] gives you secure access to your electronic health record. If you see a primary care provider, you can also send messages to your care team and make appointments. If you have questions,  please call your primary care clinic.  If you do not have a primary care provider, please call [phone] and they will assist you.        Care EveryWhere ID     This is your Care EveryWhere ID. This could be used by other organizations to access your [facility] medical records  [id number]         Blood Pressure from Last 3 Encounters:   02/12/18 132/90   01/12/18 138/72   11/08/16 126/70    Weight from Last 3 Encounters:   01/12/18 60.8 kg (134 lb)   11/08/16 63 kg (139 lb)   05/25/16 60.8 kg (134 lb)              We Performed the Following     EYE EXAM (SIMPLE-NONBILLABLE)     REFRACTION        Primary Care Provider Office Phone # Fax #    Jigna F MD Nabeel [phone]       [address] [facility] [address]  [facility] [address]        Equal Access to Services     [facility]: Hadii aad ku hadasho Soomaali, waaxda luqadaha, qaybta kaalmada adeegyada, chanel sullivan hayking haider . So [facility] [phone].    ATENCIÓN: Si habla español, tiene a zamora disposición servicios gratuitos de asistencia lingüística. Llame al [phone].    We comply with applicable federal civil rights laws and [place] laws. We do not discriminate on the basis of race, color, national origin, age, disability, sex, sexual orientation, or gender identity.            Thank you!     Thank you for choosing [facility]  [facility]. Our goal is always to provide you with excellent care. Hearing back from our patients is one way we can continue to improve our services. Please take a few minutes to complete the written survey that you may receive in the mail after your visit with us. Thank you!             Your Updated Medication List - Protect others around you: Learn how to safely use, store and throw away your medicines at www.disposemymeds.org.          This list is accurate as of 5/1/18  2:40 PM.  Always use your most recent med list.                   Brand Name Dispense Instructions for use Diagnosis     ALEVE 220 MG capsule   Generic drug:  naproxen sodium      Take 220 mg by mouth daily.        atenolol-chlorthalidone 50-25 MG per tablet    TENORETIC    90 tablet    TAKE ONE-HALF (1/2) TABLET TWICE A DAY    Essential hypertension with goal blood pressure less than 140/90       estradiol 1 MG tablet    ESTRACE    90 tablet    Take 1 tablet (1 mg) by mouth daily    Menopausal syndrome (hot flashes)       fish oil-omega-3 fatty acids 1000 MG capsule      Take 2 g by mouth daily.        GLUCOSAMINE HCL-MSM PO      DAILY        MULTIVITAMIN PO      Take 1 tablet by mouth daily.        Potassium Chloride ER 20 MEQ Tbcr     270 tablet    Take 1 tablet (20 mEq) by mouth 3 times daily    Essential hypertension with goal blood pressure less than 140/90

## 2018-05-01 NOTE — LETTER
5/1/2018         RE: Christiana Lopes  [address] [facility] [address]        Dear Colleague,    Thank you for referring your patient, Christiana Lopes, to the [facility]. Please see a copy of my visit note below.    Chief Complaint   Patient presents with     COMPREHENSIVE EYE EXAM         Last Eye Exam: 6/1/2009    Dilated Previously: Yes    What are you currently using to see?  Readers, has several pairs. Uses just for reading and the computer as needed . Prefers not to wear glasses , has interest in LASIK       Distance Vision Acuity: Noticed gradual change in both eyes, slight , if any     Near Vision Acuity: Satisfied with vision while reading and using computer with readers and unaided at times     Eye Comfort: good usually, does get a little headache aura from time to time when she's tense or stressed   Do you use eye drops? 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    Patient Aunts macular degeneration one has wet, other dry      OBJECTIVE: See Ophthalmology exam    ASSESSMENT:    ICD-10-CM    1. Regular astigmatism of both eyes H52.223 EYE EXAM (SIMPLE-NONBILLABLE)     REFRACTION   2. Presbyopia H52.4 EYE EXAM (SIMPLE-NONBILLABLE)     REFRACTION   3. Dermatochalasis of eyelids of both eyes H02.833 EYE EXAM (SIMPLE-NONBILLABLE)    H02.836       PLAN:     Patient Instructions   Patient was advised of today's exam findings.  Optional to fill new glasses prescription, mild glasses prescription   Reading glasses advised  See Lico Ma MD for blepharoplasty    Return in 1 year for eye exam    Therese Hancock O.D.  [facility]   [address] [place]  [address]  [phone]      Lico Ma MD  Ophthalmic Plastic and Reconstructive Surgery  [phone]    [facility]  [address] [place] [address] [place] [address]             Again, thank you for allowing me to participate in the care of your patient.        Sincerely,        Therese Hancock OD

## 2018-06-11 ENCOUNTER — TELEPHONE (OUTPATIENT)
Dept: FAMILY MEDICINE | Facility: OTHER | Age: 63
End: 2018-06-11

## 2018-06-11 DIAGNOSIS — I10 HYPERTENSION GOAL BP (BLOOD PRESSURE) < 140/90: Primary | ICD-10-CM

## 2018-06-11 NOTE — TELEPHONE ENCOUNTER
Reason for call:  Form  Reason for Call:  Form, our goal is to have forms completed with 72 hours, however, some forms may require a visit or additional information.    Type of letter, form or note:  medical    Who is the form from?: [facility]    Where did the form come from: form was faxed in    What clinic location was the form placed at?: [facility] - [phone]    Where the form was placed: [facility]    What number is listed as a contact on the form?: Fax number is [phone]     Additional comments:  Atenolol w/Chlorthalid tabs 50/25 mg is temporarily out of stock- please fill out form     Call taken on 6/11/2018 at 10:02 AM by Patricia Zamora

## 2018-06-12 RX ORDER — CHLORTHALIDONE 25 MG/1
25 TABLET ORAL DAILY
Qty: 90 TABLET | Refills: 0 | Status: SHIPPED | OUTPATIENT
Start: 2018-06-12 | End: 2018-09-17

## 2018-06-12 RX ORDER — ATENOLOL 50 MG/1
50 TABLET ORAL DAILY
Qty: 90 TABLET | Refills: 0 | Status: SHIPPED | OUTPATIENT
Start: 2018-06-12 | End: 2018-09-17

## 2018-06-12 NOTE — TELEPHONE ENCOUNTER
LM for patient to return phone call to clinic about message below.  Bibi Dunham CMA ([facility])

## 2018-06-12 NOTE — TELEPHONE ENCOUNTER
Tenoretic is temporarily out of stock.  Please let patient know this and that I sent 2 prescriptions for her for atenolol and chlorthalidone to [facility] for her.  Hopefully in 3 months the Tenoretic will be in stock again and she can have this combined into one pill again.  Form was shredded since I electronically sent the prescriptions.

## 2018-06-13 ENCOUNTER — TELEPHONE (OUTPATIENT)
Dept: FAMILY MEDICINE | Facility: OTHER | Age: 63
End: 2018-06-13

## 2018-06-13 NOTE — TELEPHONE ENCOUNTER
Summary:    Patient is due/failing the following:   MAMMOGRAM    Action needed:   Schedule a mammogram     Type of outreach:    Phone, left message for patient to call back.     Questions for provider review:    None                                                                                                                                    Tiera Hirsch       Chart routed to Care Team .        Panel Management Review      Patient has the following on her problem list:     Hypertension   Last three blood pressure readings:  BP Readings from Last 3 Encounters:   02/12/18 132/90   01/12/18 138/72   11/08/16 126/70     Blood pressure: FAILED     HTN Guidelines:  Age 18-59 BP range:  Less than 140/90  Age 60-85 with Diabetes:  Less than 140/90  Age 60-85 without Diabetes:  less than 150/90      Composite cancer screening  Chart review shows that this patient is due/due soon for the following Mammogram

## 2018-09-13 DIAGNOSIS — I10 ESSENTIAL HYPERTENSION WITH GOAL BLOOD PRESSURE LESS THAN 140/90: ICD-10-CM

## 2018-09-13 RX ORDER — ATENOLOL AND CHLORTHALIDONE TABLET 50; 25 MG/1; MG/1
TABLET ORAL
Qty: 90 TABLET | Refills: 0 | OUTPATIENT
Start: 2018-09-13

## 2018-09-13 NOTE — TELEPHONE ENCOUNTER
Atenolol-chlorthalidone    Sent 1/12/2018 with 1 year supply. Refill not appropriate at this time.     Gloria Russo, RN, BSN

## 2018-09-17 ENCOUNTER — MYC MEDICAL ADVICE (OUTPATIENT)
Dept: FAMILY MEDICINE | Facility: OTHER | Age: 63
End: 2018-09-17

## 2018-09-17 DIAGNOSIS — N95.1 MENOPAUSAL SYNDROME (HOT FLASHES): ICD-10-CM

## 2018-09-17 DIAGNOSIS — I10 HYPERTENSION GOAL BP (BLOOD PRESSURE) < 140/90: ICD-10-CM

## 2018-09-17 DIAGNOSIS — I10 ESSENTIAL HYPERTENSION WITH GOAL BLOOD PRESSURE LESS THAN 140/90: ICD-10-CM

## 2018-09-17 RX ORDER — CHLORTHALIDONE 25 MG/1
TABLET ORAL
Qty: 90 TABLET | Refills: 0 | Status: CANCELLED | OUTPATIENT
Start: 2018-09-17

## 2018-09-17 RX ORDER — ATENOLOL 50 MG/1
TABLET ORAL
Qty: 90 TABLET | Refills: 0 | Status: CANCELLED | OUTPATIENT
Start: 2018-09-17

## 2018-09-17 NOTE — TELEPHONE ENCOUNTER
"Pasted from other [employer] encounter: \"In referance to last E message...  Also a updated 90 day refill on my  Pot Chlor Er Tabs 20MEQ. Thank you again\"  "

## 2018-09-18 RX ORDER — POTASSIUM CHLORIDE 1500 MG/1
20 TABLET, EXTENDED RELEASE ORAL 3 TIMES DAILY
Qty: 270 TABLET | Refills: 3 | OUTPATIENT
Start: 2018-09-18

## 2018-09-18 RX ORDER — ESTRADIOL 1 MG/1
1 TABLET ORAL DAILY
Qty: 90 TABLET | Refills: 3 | OUTPATIENT
Start: 2018-09-18

## 2018-09-18 RX ORDER — ATENOLOL 50 MG/1
50 TABLET ORAL DAILY
Qty: 90 TABLET | Refills: 0 | Status: SHIPPED | OUTPATIENT
Start: 2018-09-18 | End: 2018-12-12

## 2018-09-18 RX ORDER — CHLORTHALIDONE 25 MG/1
25 TABLET ORAL DAILY
Qty: 90 TABLET | Refills: 0 | Status: SHIPPED | OUTPATIENT
Start: 2018-09-18 | End: 2018-12-12

## 2018-09-18 NOTE — TELEPHONE ENCOUNTER
Chlorthalidone and Atenolol    Routing refill request to provider for review/approval because:  Labs not current:  B/P    BP Readings from Last 3 Encounters:   02/12/18 132/90   01/12/18 138/72   11/08/16 126/70       Potassium Chloride    Sent 1/12/2018 with 1 year supply. Refill not appropriate at this time.       Estradiol    Sent 2/16/2018 with 1 year supply. Refill not appropriate at this time.       Gloria Russo, RN, BSN

## 2018-10-16 ENCOUNTER — TELEPHONE (OUTPATIENT)
Dept: FAMILY MEDICINE | Facility: OTHER | Age: 63
End: 2018-10-16

## 2018-10-16 NOTE — TELEPHONE ENCOUNTER
Summary:    Patient is due/failing the following:   MAMMOGRAM    Action needed:   Schedule a mammogram     Type of outreach:    Phone, spoke to patient.  patient will call back to schedule .     Questions for provider review:    None                                                                                                                                    Tiera Hirsch       Chart routed to Care Team .      Panel Management Review      Patient has the following on her problem list:     Hypertension   Last three blood pressure readings:  BP Readings from Last 3 Encounters:   02/12/18 132/90   01/12/18 138/72   11/08/16 126/70     Blood pressure: FAILED    HTN Guidelines:  Age 18-59 BP range:  Less than 140/90  Age 60-85 with Diabetes:  Less than 140/90  Age 60-85 without Diabetes:  less than 150/90      Composite cancer screening  Chart review shows that this patient is due/due soon for the following Mammogram

## 2018-11-15 NOTE — PROGRESS NOTES
SUBJECTIVE:   Christiana Lopes is a 62 year old female who presents to clinic today for the following health issues:    HPI  Concern - mass  Onset: 3 weeks    Description:   Pea sized lump on rib cage near center of chest    Intensity: mild    Progression of Symptoms:  same    Accompanying Signs & Symptoms:  Non-tender    Previous history of similar problem:   Has had a fatty deposit in her breast    Precipitating factors:   Worsened by: None    Alleviating factors:  Improved by: None    Therapies Tried and outcome: None   Problem list and histories reviewed & adjusted, as indicated.  Additional history: as documented    Patient Active Problem List   Diagnosis     Hypertension goal BP (blood pressure) < 140/90     Advanced directives, counseling/discussion     History of cervical cancer     Past Surgical History:   Procedure Laterality Date     BACK SURGERY  2009     BIOPSY BREAST  2011    benign     C APPENDECTOMY       C ENLARGE BREAST  2006     C NONSPECIFIC PROCEDURE  2-04    ventral hernia repair     C NONSPECIFIC PROCEDURE  12/29/11    Lumbar fusion L5-S1     C NONSPECIFIC PROCEDURE      cervical fusion C3     C/SECTION, CLASSICAL      x2     COLONOSCOPY  2005     COLONOSCOPY N/A 2/12/2018    Procedure: COLONOSCOPY;  COLONOSCOPY;  Surgeon: Ken Fair MD;  Location:  GI     HEAD & NECK SURGERY  2011     HERNIA REPAIR  2003     HYSTERECTOMY  1990    one ovary removed, h/o cervical cancer       Social History   Substance Use Topics     Smoking status: Former Smoker     Packs/day: 1.00     Years: 2.00     Types: Cigarettes     Quit date: 8/1/2010     Smokeless tobacco: Never Used      Comment: no use of tobacco products     Alcohol use Yes      Comment: socially     Family History   Problem Relation Age of Onset     GASTROINTESTINAL DISEASE Mother      kidney failure     Arthritis Mother      Cancer Mother      leukemia     Hypertension Mother 50     Breast Cancer Mother      Hypertension Maternal  Grandmother 50     Arthritis Maternal Grandfather      Cerebrovascular Disease Maternal Grandfather      Cerebrovascular Disease Paternal Grandfather      Cardiovascular Paternal Grandmother 90     hardening of the arteries     Cancer - colorectal Paternal Grandmother      Hypertension Brother 50     Hypertension Sister 50     Allergies Daughter      Allergies Daughter      Allergies Daughter      Macular Degeneration Paternal Aunt      dry     Macular Degeneration Paternal Aunt      wet     Asthma No family hx of      C.A.D. No family hx of      Diabetes No family hx of      Prostate Cancer No family hx of      Alcohol/Drug No family hx of      Alzheimer Disease No family hx of      Anesthesia Reaction No family hx of      Blood Disease No family hx of      Circulatory No family hx of      Congenital Anomalies No family hx of      Connective Tissue Disorder No family hx of      Depression No family hx of      Eye Disorder No family hx of      Genetic Disorder No family hx of      Genitourinary Problems No family hx of      Gynecology No family hx of      HEART DISEASE No family hx of      Lipids No family hx of      Musculoskeletal Disorder No family hx of      Neurologic Disorder No family hx of      Obesity No family hx of      Osteoporosis No family hx of      Psychotic Disorder No family hx of      Thyroid Disease No family hx of      Respiratory No family hx of      Glaucoma No family hx of              OBJECTIVE:     /72 (BP Location: Right arm, Patient Position: Chair, Cuff Size: Adult Regular)  Pulse 65  Temp 99.1  F (37.3  C) (Temporal)  Resp 14  Wt 136 lb (61.7 kg)  SpO2 96%  BMI 25.28 kg/m2  Body mass index is 25.28 kg/(m^2).  Gen: no apparent distress  Breasts are symmetric.  No dominant, discrete, fixed  or suspicious masses are noted.  Mild symmetric fibrocystic densities are noted in both upper outer quadrants. No skin or nipple changes or axillary nodes.   Skin:  On left upper abdomen  just under the rib cage margin there is a smooth 7mm subcutaneous mobile, nontender mass.  No overlying skin disruption.      ASSESSMENT/PLAN:     1. Lipoma of skin and subcutaneous tissue  Suspect lipoma, it is not part of her breast tissue.  Patient denies pain or change in size.   She declines referral to surgery for removal.    Jigna Lees MD  [facility]

## 2018-11-20 ENCOUNTER — OFFICE VISIT (OUTPATIENT)
Dept: FAMILY MEDICINE | Facility: OTHER | Age: 63
End: 2018-11-20
Payer: COMMERCIAL

## 2018-11-20 VITALS
TEMPERATURE: 99.1 F | BODY MASS INDEX: 25.28 KG/M2 | HEART RATE: 65 BPM | WEIGHT: 136 LBS | DIASTOLIC BLOOD PRESSURE: 72 MMHG | RESPIRATION RATE: 14 BRPM | SYSTOLIC BLOOD PRESSURE: 136 MMHG | OXYGEN SATURATION: 96 %

## 2018-11-20 DIAGNOSIS — D17.30 LIPOMA OF SKIN AND SUBCUTANEOUS TISSUE: Primary | ICD-10-CM

## 2018-11-20 PROCEDURE — 99212 OFFICE O/P EST SF 10 MIN: CPT | Performed by: FAMILY MEDICINE

## 2018-11-20 NOTE — MR AVS SNAPSHOT
After Visit Summary   11/20/2018    Christiana Lopes    MRN: [id number]           Patient Information     Date Of Birth          1955        Visit Information        Provider Department      11/20/2018 2:20 PM Jigna Lees MD [facility]        Today's Diagnoses     Lipoma of skin and subcutaneous tissue    -  1       Follow-ups after your visit        Who to contact     If you have questions or need follow up information about today's clinic visit or your schedule please contact [facility] directly at [phone].  Normal or non-critical lab and imaging results will be communicated to you by [employer], letter or phone within 4 business days after the clinic has received the results. If you do not hear from us within 7 days, please contact the clinic through expressor softwaret or phone. If you have a critical or abnormal lab result, we will notify you by phone as soon as possible.  Submit refill requests through [employer] or call your pharmacy and they will forward the refill request to us. Please allow 3 business days for your refill to be completed.          Additional Information About Your Visit        MyChart Information     [employer] gives you secure access to your electronic health record. If you see a primary care provider, you can also send messages to your care team and make appointments. If you have questions, please call your primary care clinic.  If you do not have a primary care provider, please call [phone] and they will assist you.        Care EveryWhere ID     This is your Care EveryWhere ID. This could be used by other organizations to access your [facility] medical records  [id number]        Your Vitals Were     Pulse Temperature Respirations Pulse Oximetry BMI (Body Mass Index)       65 99.1  F (37.3  C) (Temporal) 14 96% 25.28 kg/m2        Blood Pressure from Last 3 Encounters:   11/20/18 136/72   02/12/18 132/90   01/12/18 138/72    Weight from  Last 3 Encounters:   11/20/18 136 lb (61.7 kg)   01/12/18 134 lb (60.8 kg)   11/08/16 139 lb (63 kg)              Today, you had the following     No orders found for display       Primary Care Provider Office Phone # Fax #    Jigna FREDI MD [facility] [phone]       [address] [facility] [address]  [facility] [address]        Equal Access to Services     RONNI JONES : Hadii aad ku hadasho Soomaali, waaxda luqadaha, qaybta kaalmada adeegyada, waxay idiin hayaan adequan kapadiaarmondhumberto haider . So [facility] [phone].    ATENCIÓN: Si habla español, tiene a zamora disposición servicios gratuitos de asistencia lingüística. Llame al [phone].    We comply with applicable federal civil rights laws and [place] laws. We do not discriminate on the basis of race, color, national origin, age, disability, sex, sexual orientation, or gender identity.            Thank you!     Thank you for choosing [facility]  for your care. Our goal is always to provide you with excellent care. Hearing back from our patients is one way we can continue to improve our services. Please take a few minutes to complete the written survey that you may receive in the mail after your visit with us. Thank you!             Your Updated Medication List - Protect others around you: Learn how to safely use, store and throw away your medicines at www.disposemymeds.org.          This list is accurate as of 11/20/18  2:53 PM.  Always use your most recent med list.                   Brand Name Dispense Instructions for use Diagnosis    ALEVE 220 MG capsule   Generic drug:  naproxen sodium      Take 220 mg by mouth daily.        atenolol 50 MG tablet    TENORMIN    90 tablet    Take 1 tablet (50 mg) by mouth daily    Hypertension goal BP (blood pressure) < 140/90       chlorthalidone 25 MG tablet    HYGROTON    90 tablet    Take 1 tablet (25 mg) by mouth daily    Hypertension goal BP (blood pressure) < 140/90       estradiol 1 MG tablet    ESTRACE     90 tablet    Take 1 tablet (1 mg) by mouth daily    Menopausal syndrome (hot flashes)       fish oil-omega-3 fatty acids 1000 MG capsule      Take 2 g by mouth daily.        GLUCOSAMINE HCL-MSM PO      DAILY        MULTIVITAMIN PO      Take 1 tablet by mouth daily.        potassium chloride ER 20 MEQ ER tablet    K-TAB    270 tablet    Take 1 tablet (20 mEq) by mouth 3 times daily    Essential hypertension with goal blood pressure less than 140/90       [facility] [place]

## 2018-12-12 DIAGNOSIS — I10 HYPERTENSION GOAL BP (BLOOD PRESSURE) < 140/90: ICD-10-CM

## 2018-12-12 RX ORDER — ATENOLOL 50 MG/1
TABLET ORAL
Qty: 90 TABLET | Refills: 0 | Status: SHIPPED | OUTPATIENT
Start: 2018-12-12 | End: 2019-03-01

## 2018-12-12 RX ORDER — CHLORTHALIDONE 25 MG/1
TABLET ORAL
Qty: 90 TABLET | Refills: 0 | Status: SHIPPED | OUTPATIENT
Start: 2018-12-12 | End: 2019-03-01

## 2018-12-12 NOTE — TELEPHONE ENCOUNTER
"Requested Prescriptions   Pending Prescriptions Disp Refills     chlorthalidone (HYGROTON) 25 MG tablet [Pharmacy Med Name: CHLORTHALIDONE TABS 25MG] 90 tablet 0     Sig: TAKE 1 TABLET DAILY    Diuretics (Including Combos) Protocol Passed - 12/12/2018  8:47 AM       Passed - Blood pressure under 140/90 in past 12 months    BP Readings from Last 3 Encounters:   11/20/18 136/72   02/12/18 132/90   01/12/18 138/72          Passed - Recent (12 mo) or future (30 days) visit within the authorizing provider's specialty    Patient had office visit in the last 12 months or has a visit in the next 30 days with authorizing provider or within the authorizing provider's specialty.  See \"Patient Info\" tab in inbasket, or \"Choose Columns\" in Meds & Orders section of the refill encounter.         Passed - Patient is age 18 or older       Passed - No active pregancy on record       Passed - Normal serum creatinine on file in past 12 months    Recent Labs   Lab Test 01/12/18  1517   CR 0.75          Passed - Normal serum potassium on file in past 12 months    Recent Labs   Lab Test 01/12/18  1517   POTASSIUM 3.5          Passed - Normal serum sodium on file in past 12 months    Recent Labs   Lab Test 01/12/18  1517             Passed - No positive pregnancy test in past 12 months        atenolol (TENORMIN) 50 MG tablet [Pharmacy Med Name: ATENOLOL TABS 50MG] 90 tablet 0     Sig: TAKE 1 TABLET DAILY    Beta-Blockers Protocol Passed - 12/12/2018  8:47 AM       Passed - Blood pressure under 140/90 in past 12 months    BP Readings from Last 3 Encounters:   11/20/18 136/72   02/12/18 132/90   01/12/18 138/72          Passed - Patient is age 6 or older       Passed - Recent (12 mo) or future (30 days) visit within the authorizing provider's specialty    Patient had office visit in the last 12 months or has a visit in the next 30 days with authorizing provider or within the authorizing provider's specialty.  See \"Patient Info\" tab in " "inbasket, or \"Choose Columns\" in Meds & Orders section of the refill encounter.        chlorthalidone (HYGROTON) 25 MG tablet  Prescription approved per [facility] Refill Protocol.    atenolol (TENORMIN) 50 MG tablet  Prescription approved per [facility] Refill Protocol.    Due for physical around 01/12/2019  Please assist with scheduling.    Ruth Navarro RN, BSN         "

## 2019-02-21 ENCOUNTER — ANCILLARY PROCEDURE (OUTPATIENT)
Dept: MAMMOGRAPHY | Facility: OTHER | Age: 64
End: 2019-02-21
Payer: COMMERCIAL

## 2019-02-21 DIAGNOSIS — Z12.31 VISIT FOR SCREENING MAMMOGRAM: ICD-10-CM

## 2019-02-21 PROCEDURE — 77067 SCR MAMMO BI INCL CAD: CPT | Mod: TC

## 2019-02-26 NOTE — PATIENT INSTRUCTIONS
Lico Ma MD  Ophthalmic Plastic and Reconstructive Surgery  [phone]     [facility]  [address] [place] [address]    Preventive Health Recommendations    See your health care provider every year to    Review health changes.     Discuss preventive care.      Review your medicines if your doctor has prescribed any.      You no longer need a yearly Pap test unless you've had an abnormal Pap test in the past 10 years. If you have vaginal symptoms, such as bleeding or discharge, be sure to talk with your provider about a Pap test.      Every 1 to 2 years, have a mammogram.  If you are over 69, talk with your health care provider about whether or not you want to continue having screening mammograms.      Every 10 years, have a colonoscopy. Or, have a yearly FIT test (stool test). These exams will check for colon cancer.       Have a cholesterol test every 5 years, or more often if your doctor advises it.       Have a diabetes test (fasting glucose) every three years. If you are at risk for diabetes, you should have this test more often.       At age 65, have a bone density scan (DEXA) to check for osteoporosis (brittle bone disease).    Shots:    Get a flu shot each year.    Get a tetanus shot every 10 years.    Talk to your doctor about your pneumonia vaccines. There are now two you should receive - Pneumovax (PPSV 23) and Prevnar (PCV 13).    Talk to your pharmacist about the shingles vaccine.    Talk to your doctor about the hepatitis B vaccine.    Nutrition:     Eat at least 5 servings of fruits and vegetables each day.      Eat whole-grain bread, whole-wheat pasta and brown rice instead of white grains and rice.      Get adequate Calcium and Vitamin D.     Lifestyle    Exercise at least 150 minutes a week (30 minutes a day, 5 days a week). This will help you control your weight and prevent disease.      Limit alcohol to one drink per day.      No smoking.       Wear sunscreen  to prevent skin cancer.       See your dentist twice a year for an exam and cleaning.      See your eye doctor every 1 to 2 years to screen for conditions such as glaucoma, macular degeneration and cataracts.    Personalized Prevention Plan  You are due for the preventive services outlined below.  Your care team is available to assist you in scheduling these services.  If you have already completed any of these items, please share that information with your care team to update in your medical record.  Health Maintenance Due   Topic Date Due     HIV SCREEN (SYSTEM ASSIGNED)  12/08/1971     Zoster (Shingles) Vaccine (2 of 3) 02/25/2014     FALL RISK ASSESSMENT  12/08/2018     Bone Density Screening (Dexa)  12/08/2018     Basic Metabolic Lab - yearly  01/12/2019     Annual Wellness Visit  01/12/2019     Depression Assessment 2 - yearly  01/12/2019

## 2019-02-26 NOTE — PROGRESS NOTES
"SUBJECTIVE:   Christiana Lopes is a 63 year old female who presents for Preventive Visit.  Are you in the first 12 months of your Medicare coverage?  Yes    Annual Wellness Visit     In general, how would you rate your overall health?  Good    Frequency of exercise:  2-3 days/week    Duration of exercise:  Greater than 60 minutes    Do you usually eat at least 4 servings of fruit and vegetables a day, include whole grains    & fiber and avoid regularly eating high fat or \"junk\" foods?  Yes    Taking medications regularly:  Yes    Medication side effects:  None    Ability to successfully perform activities of daily living:  No assistance needed    Home Safety:  No safety concerns identified    Hearing Impairment:  No hearing concerns    In the past 6 months, have you been bothered by leaking of urine? Yes    In general, how would you rate your overall mental or emotional health?  Good    PHQ-2 Total Score: 0    Additional concerns today:  Yes (shortness of breath on exertion )    Do you feel safe in your environment? Yes    Do you have a Health Care Directive? No: Advance care planning reviewed with patient; information given to patient to review.      Fall risk  Fallen 2 or more times in the past year?: No  Any fall with injury in the past year?: No    Cognitive Screening   1) Repeat 3 items (Leader, Season, Table)    2) Clock draw: NORMAL  3) 3 item recall: Recalls 3 objects  Results: 3 items recalled: COGNITIVE IMPAIRMENT LESS LIKELY    Mini-CogTM Copyright S Clarissa. Licensed by the author for use in [facility]; reprinted with permission (rufino@.[facility]). All rights reserved.      Do you have sleep apnea, excessive snoring or daytime drowsiness?: no    Reviewed and updated as needed this visit by clinical staff  Tobacco  Allergies  Meds  Problems  Med Hx  Surg Hx  Fam Hx         Reviewed and updated as needed this visit by Provider  Tobacco  Allergies  Meds  Problems  Med Hx  Surg Hx  Fam " Hx        Social History     Tobacco Use     Smoking status: Former Smoker     Packs/day: 1.00     Years: 2.00     Pack years: 2.00     Types: Cigarettes     Last attempt to quit: 2010     Years since quittin.5     Smokeless tobacco: Never Used     Tobacco comment: no use of tobacco products   Substance Use Topics     Alcohol use: Yes     Comment: socially       Alcohol Use 3/1/2019   If you drink alcohol do you typically have greater than 3 drinks per day OR greater than 7 drinks per week? No   No flowsheet data found.    Current providers sharing in care for this patient include:   Patient Care Team:  Jigna Lees MD as PCP - General (Family Practice)  Jigna Lees MD as PCP - Assigned PCP    The following health maintenance items are reviewed in Epic and correct as of today:  Health Maintenance   Topic Date Due     HIV SCREEN (SYSTEM ASSIGNED)  1971     ZOSTER IMMUNIZATION (2 of 3) 2014     FALL RISK ASSESSMENT  2018     DEXA SCAN SCREENING (SYSTEM ASSIGNED)  2018     BMP Q1 YR  2019     MEDICARE ANNUAL WELLNESS VISIT  2019     PHQ-2 Q1 YR  2019     PAP Q3 YR  2019     MAMMO SCREEN Q2 YR (SYSTEM ASSIGNED)  2021     LIPID SCREEN Q5 YR FEMALE (SYSTEM ASSIGNED)  2023     ADVANCE DIRECTIVE PLANNING Q5 YRS  2023     DTAP/TDAP/TD IMMUNIZATION (3 - Td) 2024     COLONOSCOPY Q10 YR  2028     INFLUENZA VACCINE  Completed     HEPATITIS C SCREENING  Completed     IPV IMMUNIZATION  Aged Out     MENINGITIS IMMUNIZATION  Aged Out           Review of Systems   Constitutional: Negative for chills.   HENT: Negative for congestion and ear pain.    Eyes: Negative for pain.   Respiratory: Negative for cough.    Cardiovascular: Negative for chest pain.   Gastrointestinal: Negative for abdominal pain, constipation, diarrhea and hematochezia.   Genitourinary: Negative for hematuria.   Neurological: Negative for dizziness.  "        OBJECTIVE:   /60 (BP Location: Left arm, Patient Position: Chair, Cuff Size: Adult Regular)   Pulse 53   Temp 98.8  F (37.1  C) (Temporal)   Resp 14   Ht 1.56 m (5' 1.42\")   Wt 61.2 kg (135 lb)   SpO2 97%   BMI 25.16 kg/m   Estimated body mass index is 25.16 kg/m  as calculated from the following:    Height as of this encounter: 1.56 m (5' 1.42\").    Weight as of this encounter: 61.2 kg (135 lb).  Physical Exam   Constitutional: She is oriented to person, place, and time. She appears well-developed and well-nourished. No distress.   HENT:   Right Ear: Tympanic membrane and external ear normal.   Left Ear: Tympanic membrane and external ear normal.   Nose: Nose normal.   Mouth/Throat: Oropharynx is clear and moist. No oropharyngeal exudate.   Eyes: Conjunctivae are normal. Pupils are equal, round, and reactive to light. Right eye exhibits no discharge. Left eye exhibits no discharge.   Neck: Neck supple. No tracheal deviation present. No thyromegaly present.   Cardiovascular: Normal rate, regular rhythm, S1 normal, S2 normal, normal heart sounds and normal pulses. Exam reveals no S3, no S4 and no friction rub.   No murmur heard.  Pulmonary/Chest: Effort normal and breath sounds normal. No respiratory distress. She has no wheezes. She has no rales.   Abdominal: Soft. Bowel sounds are normal. She exhibits no mass. There is no hepatosplenomegaly. There is no tenderness.   Musculoskeletal: Normal range of motion. She exhibits no edema.   Lymphadenopathy:     She has no cervical adenopathy.   Neurological: She is alert and oriented to person, place, and time. She has normal strength and normal reflexes. She exhibits normal muscle tone.   Skin: Skin is warm and dry. No rash noted.   Psychiatric: She has a normal mood and affect. Judgment and thought content normal. Cognition and memory are normal.       ASSESSMENT / PLAN:   1. Encounter for routine adult health examination without abnormal " "findings      2. Menopausal syndrome (hot flashes)  Just restarted last year, feels it is going well, not ready to try decreasing yet.    - estradiol (ESTRACE) 1 MG tablet; Take 1 tablet (1 mg) by mouth daily  Dispense: 90 tablet; Refill: 3    3. Hypertension goal BP (blood pressure) < 140/90  Well controlled, refills given, labs drawn.    - BASIC METABOLIC PANEL  - potassium chloride ER (K-TAB) 20 MEQ CR tablet; Take 1 tablet (20 mEq) by mouth 3 times daily  Dispense: 270 tablet; Refill: 3  - chlorthalidone (HYGROTON) 25 MG tablet; Take 1 tablet (25 mg) by mouth daily  Dispense: 90 tablet; Refill: 3  - atenolol (TENORMIN) 50 MG tablet; Take 1 tablet (50 mg) by mouth daily  Dispense: 90 tablet; Refill: 3    4. Asymptomatic postmenopausal status  Agreeable to bone density testing.    - DEXA HIP/PELVIS/SPINE - Future; Future    5. EMNON (dyspnea on exertion)  States that over the last year has noticed that she has trouble catching her breath after walking, feels it takes her longer to recover.  However, this doesn't happen when she runs--usually runs 3 mnles every other day. It is noticeable enough that her daughter and her horse's  have commented on it.  Will obtain stress test.        - Echocardiogram Exercise Stress; Future    End of Life Planning:  Patient currently has an advanced directive: No.  I have verified the patient's ablity to prepare an advanced directive/make health care decisions.  Literature was provided to assist patient in preparing an advanced directive.    COUNSELING:  Reviewed preventive health counseling, as reflected in patient instructions    BP Readings from Last 1 Encounters:   03/01/19 126/60     Estimated body mass index is 25.16 kg/m  as calculated from the following:    Height as of this encounter: 1.56 m (5' 1.42\").    Weight as of this encounter: 61.2 kg (135 lb).           reports that she quit smoking about 8 years ago. Her smoking use included cigarettes. She has a 2.00 " pack-year smoking history. she has never used smokeless tobacco.      Appropriate preventive services were discussed with this patient, including applicable screening as appropriate for cardiovascular disease, diabetes, osteopenia/osteoporosis, and glaucoma.  As appropriate for age/gender, discussed screening for colorectal cancer, prostate cancer, breast cancer, and cervical cancer. Checklist reviewing preventive services available has been given to the patient.    Reviewed patients plan of care and provided an AVS. The Basic Care Plan (routine screening as documented in Health Maintenance) for Christiana meets the Care Plan requirement. This Care Plan has been established and reviewed with the Patient.    Counseling Resources:  ATP IV Guidelines  Pooled Cohorts Equation Calculator  Breast Cancer Risk Calculator  FRAX Risk Assessment  ICSI Preventive Guidelines  Dietary Guidelines for Americans, 2010  USDA's MyPlate  ASA Prophylaxis  Lung CA Screening    Jigna Lees MD  [facility]

## 2019-03-01 ENCOUNTER — OFFICE VISIT (OUTPATIENT)
Dept: FAMILY MEDICINE | Facility: OTHER | Age: 64
End: 2019-03-01
Payer: COMMERCIAL

## 2019-03-01 VITALS
RESPIRATION RATE: 14 BRPM | HEIGHT: 61 IN | DIASTOLIC BLOOD PRESSURE: 60 MMHG | HEART RATE: 53 BPM | TEMPERATURE: 98.8 F | OXYGEN SATURATION: 97 % | SYSTOLIC BLOOD PRESSURE: 126 MMHG | BODY MASS INDEX: 25.49 KG/M2 | WEIGHT: 135 LBS

## 2019-03-01 DIAGNOSIS — N95.1 MENOPAUSAL SYNDROME (HOT FLASHES): ICD-10-CM

## 2019-03-01 DIAGNOSIS — I10 HYPERTENSION GOAL BP (BLOOD PRESSURE) < 140/90: ICD-10-CM

## 2019-03-01 DIAGNOSIS — Z78.0 ASYMPTOMATIC POSTMENOPAUSAL STATUS: ICD-10-CM

## 2019-03-01 DIAGNOSIS — Z00.00 ENCOUNTER FOR ROUTINE ADULT HEALTH EXAMINATION WITHOUT ABNORMAL FINDINGS: Primary | ICD-10-CM

## 2019-03-01 DIAGNOSIS — R06.09 DOE (DYSPNEA ON EXERTION): ICD-10-CM

## 2019-03-01 LAB
ANION GAP SERPL CALCULATED.3IONS-SCNC: 7 MMOL/L (ref 3–14)
BUN SERPL-MCNC: 25 MG/DL (ref 7–30)
CALCIUM SERPL-MCNC: 8.9 MG/DL (ref 8.5–10.1)
CHLORIDE SERPL-SCNC: 104 MMOL/L (ref 94–109)
CO2 SERPL-SCNC: 28 MMOL/L (ref 20–32)
CREAT SERPL-MCNC: 0.86 MG/DL (ref 0.52–1.04)
GFR SERPL CREATININE-BSD FRML MDRD: 70 ML/MIN/{1.73_M2}
GLUCOSE SERPL-MCNC: 84 MG/DL (ref 70–99)
POTASSIUM SERPL-SCNC: 4.2 MMOL/L (ref 3.4–5.3)
SODIUM SERPL-SCNC: 139 MMOL/L (ref 133–144)

## 2019-03-01 PROCEDURE — 99213 OFFICE O/P EST LOW 20 MIN: CPT | Mod: 25 | Performed by: FAMILY MEDICINE

## 2019-03-01 PROCEDURE — 99397 PER PM REEVAL EST PAT 65+ YR: CPT | Performed by: FAMILY MEDICINE

## 2019-03-01 PROCEDURE — 80048 BASIC METABOLIC PNL TOTAL CA: CPT | Performed by: FAMILY MEDICINE

## 2019-03-01 PROCEDURE — 36415 COLL VENOUS BLD VENIPUNCTURE: CPT | Performed by: FAMILY MEDICINE

## 2019-03-01 RX ORDER — ATENOLOL 50 MG/1
50 TABLET ORAL DAILY
Qty: 90 TABLET | Refills: 3 | Status: SHIPPED | OUTPATIENT
Start: 2019-03-01 | End: 2019-05-24 | Stop reason: ALTCHOICE

## 2019-03-01 RX ORDER — POTASSIUM CHLORIDE 1500 MG/1
20 TABLET, EXTENDED RELEASE ORAL 3 TIMES DAILY
Qty: 270 TABLET | Refills: 3 | Status: SHIPPED | OUTPATIENT
Start: 2019-03-01 | End: 2019-12-14

## 2019-03-01 RX ORDER — CHLORTHALIDONE 25 MG/1
25 TABLET ORAL DAILY
Qty: 90 TABLET | Refills: 3 | Status: SHIPPED | OUTPATIENT
Start: 2019-03-01 | End: 2019-05-24 | Stop reason: ALTCHOICE

## 2019-03-01 RX ORDER — ESTRADIOL 1 MG/1
1 TABLET ORAL DAILY
Qty: 90 TABLET | Refills: 3 | Status: SHIPPED | OUTPATIENT
Start: 2019-03-01 | End: 2019-12-14

## 2019-03-01 ASSESSMENT — ENCOUNTER SYMPTOMS
DIZZINESS: 0
EYE PAIN: 0
ABDOMINAL PAIN: 0
CONSTIPATION: 0
HEMATOCHEZIA: 0
CHILLS: 0
DIARRHEA: 0
HEMATURIA: 0
COUGH: 0

## 2019-03-01 ASSESSMENT — ACTIVITIES OF DAILY LIVING (ADL): CURRENT_FUNCTION: NO ASSISTANCE NEEDED

## 2019-03-01 ASSESSMENT — MIFFLIN-ST. JEOR: SCORE: 1101.35

## 2019-03-06 ENCOUNTER — MYC MEDICAL ADVICE (OUTPATIENT)
Dept: FAMILY MEDICINE | Facility: OTHER | Age: 64
End: 2019-03-06

## 2019-03-07 ENCOUNTER — MEDICAL CORRESPONDENCE (OUTPATIENT)
Dept: HEALTH INFORMATION MANAGEMENT | Facility: CLINIC | Age: 64
End: 2019-03-07

## 2019-03-13 ENCOUNTER — HOSPITAL ENCOUNTER (OUTPATIENT)
Dept: CARDIOLOGY | Facility: CLINIC | Age: 64
Discharge: HOME | End: 2019-03-13
Attending: FAMILY MEDICINE | Admitting: FAMILY MEDICINE
Payer: COMMERCIAL

## 2019-03-13 ENCOUNTER — HOSPITAL ENCOUNTER (OUTPATIENT)
Dept: BONE DENSITY | Facility: CLINIC | Age: 64
End: 2019-03-13
Attending: FAMILY MEDICINE
Payer: COMMERCIAL

## 2019-03-13 DIAGNOSIS — R06.09 DOE (DYSPNEA ON EXERTION): ICD-10-CM

## 2019-03-13 DIAGNOSIS — Z78.0 ASYMPTOMATIC POSTMENOPAUSAL STATUS: ICD-10-CM

## 2019-03-13 PROCEDURE — 93350 STRESS TTE ONLY: CPT | Mod: TC

## 2019-03-13 PROCEDURE — 93016 CV STRESS TEST SUPVJ ONLY: CPT | Performed by: INTERNAL MEDICINE

## 2019-03-13 PROCEDURE — 93350 STRESS TTE ONLY: CPT | Mod: 26 | Performed by: INTERNAL MEDICINE

## 2019-03-13 PROCEDURE — 93321 DOPPLER ECHO F-UP/LMTD STD: CPT | Mod: 26 | Performed by: INTERNAL MEDICINE

## 2019-03-13 PROCEDURE — 93018 CV STRESS TEST I&R ONLY: CPT | Performed by: INTERNAL MEDICINE

## 2019-03-13 PROCEDURE — 77080 DXA BONE DENSITY AXIAL: CPT

## 2019-03-13 PROCEDURE — 93325 DOPPLER ECHO COLOR FLOW MAPG: CPT | Mod: 26 | Performed by: INTERNAL MEDICINE

## 2019-05-23 ENCOUNTER — TELEPHONE (OUTPATIENT)
Dept: FAMILY MEDICINE | Facility: OTHER | Age: 64
End: 2019-05-23

## 2019-05-23 DIAGNOSIS — I10 HYPERTENSION GOAL BP (BLOOD PRESSURE) < 140/90: Primary | ICD-10-CM

## 2019-05-23 NOTE — TELEPHONE ENCOUNTER
Reason for call:  Medication   If this is a refill request, has the caller requested the refill from the pharmacy already? N/A  Will the patient be using a [facility]? No  Name of the pharmacy and phone number for the current request: [facility]: [phone]    Name of the medication requested: Combination pill of Atenolol and Chlorthalidone    Other request: Patient is currently taking both medications, the pharmacy now has these two medications in the form of one combination pill. Patient needs RX for the combination pill. Please call if any questions.    Phone number to reach patient:  Home number on file [phone] (home)    Best Time:  anytime    Can we leave a detailed message on this number?  NO

## 2019-05-24 RX ORDER — ATENOLOL AND CHLORTHALIDONE TABLET 50; 25 MG/1; MG/1
1 TABLET ORAL DAILY
Qty: 90 TABLET | Refills: 2 | Status: SHIPPED | OUTPATIENT
Start: 2019-05-24 | End: 2019-12-14

## 2019-08-23 ENCOUNTER — OFFICE VISIT (OUTPATIENT)
Dept: FAMILY MEDICINE | Facility: OTHER | Age: 64
End: 2019-08-23
Payer: COMMERCIAL

## 2019-08-23 VITALS
RESPIRATION RATE: 14 BRPM | BODY MASS INDEX: 25.71 KG/M2 | HEART RATE: 60 BPM | DIASTOLIC BLOOD PRESSURE: 78 MMHG | TEMPERATURE: 98.3 F | SYSTOLIC BLOOD PRESSURE: 136 MMHG | OXYGEN SATURATION: 99 % | WEIGHT: 136.2 LBS | HEIGHT: 61 IN

## 2019-08-23 DIAGNOSIS — J06.9 URI WITH COUGH AND CONGESTION: ICD-10-CM

## 2019-08-23 PROCEDURE — 99213 OFFICE O/P EST LOW 20 MIN: CPT | Performed by: PHYSICIAN ASSISTANT

## 2019-08-23 RX ORDER — CODEINE PHOSPHATE AND GUAIFENESIN 10; 100 MG/5ML; MG/5ML
1-2 SOLUTION ORAL EVERY 4 HOURS PRN
Qty: 236 ML | Refills: 0 | Status: SHIPPED | OUTPATIENT
Start: 2019-08-23 | End: 2020-02-04

## 2019-08-23 ASSESSMENT — MIFFLIN-ST. JEOR: SCORE: 1106.84

## 2019-08-23 ASSESSMENT — PAIN SCALES - GENERAL: PAINLEVEL: NO PAIN (0)

## 2019-08-23 NOTE — PATIENT INSTRUCTIONS

## 2019-08-23 NOTE — PROGRESS NOTES
Subjective     Christiana Lopes is a 63 year old female who presents to clinic today for the following health issues:    HPI   Acute Illness   Acute illness concerns: URI  Onset: 19    Fever: YES- 100.2 has been taking Nyquil and Aleve    Chills/Sweats: YES    Headache (location?): YES    Sinus Pressure:no    Conjunctivitis:  no    Ear Pain: YES-     Rhinorrhea: YES    Congestion: YES    Sore Throat: YES- With cough     Cough: YES - Tight    Wheeze: YES    Decreased Appetite: no    Nausea: no    Vomiting: no    Diarrhea:  no    Dysuria/Freq.: no    Fatigue/Achiness: YES    Sick/Strep Exposure: YES- Grandchildren     Therapies Tried and outcome: OTC Nyquil, dayquil, Aleve    Patient Active Problem List   Diagnosis     Hypertension goal BP (blood pressure) < 140/90     Advanced directives, counseling/discussion     History of cervical cancer     URI with cough and congestion     Past Surgical History:   Procedure Laterality Date     BACK SURGERY  2009     BIOPSY BREAST  2011    benign     C APPENDECTOMY       C ENLARGE BREAST       C NONSPECIFIC PROCEDURE  2-04    ventral hernia repair     C NONSPECIFIC PROCEDURE  11    Lumbar fusion L5-S1     C NONSPECIFIC PROCEDURE      cervical fusion C3     C/SECTION, CLASSICAL      x2     COLONOSCOPY       COLONOSCOPY N/A 2018    Procedure: COLONOSCOPY;  COLONOSCOPY;  Surgeon: Ken Fair MD;  Location:  GI     HEAD & NECK SURGERY       HERNIA REPAIR       HYSTERECTOMY      one ovary removed, h/o cervical cancer       Social History     Tobacco Use     Smoking status: Former Smoker     Packs/day: 1.00     Years: 2.00     Pack years: 2.00     Types: Cigarettes     Last attempt to quit: 2010     Years since quittin.0     Smokeless tobacco: Never Used     Tobacco comment: no use of tobacco products   Substance Use Topics     Alcohol use: Yes     Comment: socially     Family History   Problem Relation Age of Onset      Gastrointestinal Disease Mother         kidney failure     Arthritis Mother      Cancer Mother         leukemia     Hypertension Mother 50     Breast Cancer Mother      Hypertension Maternal Grandmother 50     Arthritis Maternal Grandfather      Cerebrovascular Disease Maternal Grandfather      Cerebrovascular Disease Paternal Grandfather      Cardiovascular Paternal Grandmother 90        hardening of the arteries     Cancer - colorectal Paternal Grandmother      Hypertension Brother 50     Hypertension Sister 50     Allergies Daughter      Allergies Daughter      Allergies Daughter      Macular Degeneration Paternal Aunt         dry     Macular Degeneration Paternal Aunt         wet     Asthma No family hx of      C.A.D. No family hx of      Diabetes No family hx of      Prostate Cancer No family hx of      Alcohol/Drug No family hx of      Alzheimer Disease No family hx of      Anesthesia Reaction No family hx of      Blood Disease No family hx of      Circulatory No family hx of      Congenital Anomalies No family hx of      Connective Tissue Disorder No family hx of      Depression No family hx of      Eye Disorder No family hx of      Genetic Disorder No family hx of      Genitourinary Problems No family hx of      Gynecology No family hx of      Heart Disease No family hx of      Lipids No family hx of      Musculoskeletal Disorder No family hx of      Neurologic Disorder No family hx of      Obesity No family hx of      Osteoporosis No family hx of      Psychotic Disorder No family hx of      Thyroid Disease No family hx of      Respiratory No family hx of      Glaucoma No family hx of          Current Outpatient Medications   Medication Sig Dispense Refill     atenolol-chlorthalidone (TENORETIC) 50-25 MG tablet Take 1 tablet by mouth daily 90 tablet 2     estradiol (ESTRACE) 1 MG tablet Take 1 tablet (1 mg) by mouth daily 90 tablet 3     fish oil-omega-3 fatty acids (FISH OIL) 1000 MG capsule Take 2 g by  "mouth daily.       GLUCOSAMINE HCL-MSM PO DAILY       guaiFENesin-codeine (ROBITUSSIN AC) 100-10 MG/5ML solution Take 5-10 mLs by mouth every 4 hours as needed for cough 236 mL 0     Multiple Vitamin (MULTIVITAMIN OR) Take 1 tablet by mouth daily.       Naproxen Sodium (ALEVE) 220 MG capsule Take 220 mg by mouth daily.       potassium chloride ER (K-TAB) 20 MEQ CR tablet Take 1 tablet (20 mEq) by mouth 3 times daily 270 tablet 3     [facility] WORT PO        No Known Allergies  Recent Labs   Lab Test 03/01/19  1357 01/12/18  1517 11/08/16  1529  08/25/11  1621   LDL  --  103* 153*  --  144*   HDL  --  100 76  --  69   TRIG  --  156* 195*  --  156*   CR 0.86 0.75 0.95   < > 0.90   GFRESTIMATED 70 77 60*   < > 65   GFRESTBLACK 82 >90 72   < > 78   POTASSIUM 4.2 3.5 3.6   < > 3.6    < > = values in this interval not displayed.      BP Readings from Last 3 Encounters:   08/23/19 136/78   03/01/19 126/60   11/20/18 136/72    Wt Readings from Last 3 Encounters:   08/23/19 61.8 kg (136 lb 3.2 oz)   03/01/19 61.2 kg (135 lb)   11/20/18 61.7 kg (136 lb)                    Reviewed and updated as needed this visit by Provider         Review of Systems   ROS COMP: CONSTITUTIONAL: NEGATIVE for fever, chills, change in weight  INTEGUMENTARY/SKIN: NEGATIVE for worrisome rashes, moles or lesions  CV: NEGATIVE for chest pain, palpitations or peripheral edema  GI: NEGATIVE for nausea, abdominal pain, heartburn, or change in bowel habits  MUSCULOSKELETAL: NEGATIVE for significant arthralgias or myalgia  ENDOCRINE: NEGATIVE for temperature intolerance, skin/hair changes  PSYCHIATRIC: NEGATIVE for changes in mood or affect      Objective    /78   Pulse 60   Temp 98.3  F (36.8  C) (Temporal)   Resp 14   Ht 1.56 m (5' 1.42\")   Wt 61.8 kg (136 lb 3.2 oz)   SpO2 99%   BMI 25.38 kg/m    Body mass index is 25.38 kg/m .  Physical Exam   GENERAL: healthy, alert and no distress  HENT: ear canals and TM's normal, nose and mouth " "without ulcers or lesions  NECK: no adenopathy, no asymmetry, masses, or scars and thyroid normal to palpation  RESP: lungs clear to auscultation - no rales, rhonchi or wheezes  CV: regular rate and rhythm, normal S1 S2, no S3 or S4, no murmur, click or rub, no peripheral edema and peripheral pulses strong  MS: no gross musculoskeletal defects noted, no edema  SKIN: no suspicious lesions or rashes to visible skin.  NEURO: Normal strength and tone, mentation intact and speech normal  PSYCH: mentation appears normal, affect normal/bright    Diagnostic Test Results:  Labs reviewed in Epic  No results found for this or any previous visit (from the past 24 hour(s)).        Assessment & Plan     1. URI with cough and congestion  Most ;likely viral  - guaiFENesin-codeine (ROBITUSSIN AC) 100-10 MG/5ML solution; Take 5-10 mLs by mouth every 4 hours as needed for cough  Dispense: 236 mL; Refill: 0     BMI:   Estimated body mass index is 25.38 kg/m  as calculated from the following:    Height as of this encounter: 1.56 m (5' 1.42\").    Weight as of this encounter: 61.8 kg (136 lb 3.2 oz).   Weight management plan: Discussed healthy diet and exercise guidelines    Return in about 4 weeks (around 9/20/2019) for recheck of current condition, if symptoms do not improve.    Chas Coppola PA-C  [facility]    "

## 2019-11-26 ENCOUNTER — MYC MEDICAL ADVICE (OUTPATIENT)
Dept: FAMILY MEDICINE | Facility: OTHER | Age: 64
End: 2019-11-26

## 2019-11-26 NOTE — TELEPHONE ENCOUNTER
Will likely need a visit as this issue has not been discussed in clinic. Patient was last seen 3/2019 for wellness exam. Will route to TC to review/advise  Niurka Matta CMA

## 2019-12-04 ENCOUNTER — OFFICE VISIT (OUTPATIENT)
Dept: ORTHOPEDICS | Facility: OTHER | Age: 64
End: 2019-12-04
Payer: COMMERCIAL

## 2019-12-04 ENCOUNTER — ANCILLARY PROCEDURE (OUTPATIENT)
Dept: GENERAL RADIOLOGY | Facility: OTHER | Age: 64
End: 2019-12-04
Attending: ORTHOPAEDIC SURGERY
Payer: COMMERCIAL

## 2019-12-04 VITALS
SYSTOLIC BLOOD PRESSURE: 122 MMHG | WEIGHT: 139 LBS | HEIGHT: 61 IN | DIASTOLIC BLOOD PRESSURE: 64 MMHG | BODY MASS INDEX: 26.24 KG/M2

## 2019-12-04 DIAGNOSIS — S46.012A TRAUMATIC TEAR OF LEFT ROTATOR CUFF, UNSPECIFIED TEAR EXTENT, INITIAL ENCOUNTER: Primary | ICD-10-CM

## 2019-12-04 PROCEDURE — 73030 X-RAY EXAM OF SHOULDER: CPT | Mod: LT

## 2019-12-04 PROCEDURE — 99204 OFFICE O/P NEW MOD 45 MIN: CPT | Performed by: ORTHOPAEDIC SURGERY

## 2019-12-04 ASSESSMENT — MIFFLIN-ST. JEOR: SCORE: 1119.54

## 2019-12-04 ASSESSMENT — PAIN SCALES - GENERAL: PAINLEVEL: SEVERE PAIN (6)

## 2019-12-04 NOTE — LETTER
12/4/2019         RE: Christiana Lopes  [address] [facility] [address]        Dear Colleague,    Thank you for referring your patient, Christiana Lopes, to the [facility]. Please see a copy of my visit note below.    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  She does distance horse riding where she rides for about 9 hours.  Her whole family is involved with horses.    She is being seen for   Chief Complaints and History of Present Illnesses   Patient presents with     Shoulder Pain     left shoulder pain     Consult         History of Present Illness:   Mechanism of Injury: In the late spring 1 of her horses was spooked and jumped and that pulled her shoulder.  After that she has had difficulty doing can get away from her body.  Location: Left lateral shoulder  Duration of Pain: Approximately 8 months  Rating of Pain: Moderate  Pain Quality: Achy but can be sharp  Pain is better with: Aleve and rest  Pain is worse with: Activity away from the body and above shoulder level.  Treatment so far consists of: Exercises that she does on her own, Tylenol at night and Aleve in the morning.  These helped slightly.  She has not had any injections or any formal therapy.  She has tried to rest it..   Associated Features: Difficulty with using the arm above shoulder level and away from body.  Difficulty with internal rotation and supination, weakness with the shoulder.  Denies numbness or tingling.  Every once while she gets her radicular type symptoms but that has been going on for a while and does not seem new.  This does wake her at night.  Prior history of related problems: No previous major surgery trauma or injury to the left shoulder  Pain is Limiting: Activity with the left shoulder especially with from the body and above  shoulder level  Here to: Orthopedic consultation  The Pain Has: Gotten worse  Additional History: Patient compensates very well because she is strong however there has been a definite change since her injury in late spring with a horse.      Patient's past medical, surgical, social and family histories reviewed.     Past Medical History:   Diagnosis Date     Arthritis     ostio     Cervix cancer (H) 1990     Depression     after 's death     History of left breast biopsy 2011    negative     HTN      UTI     history of         Past Surgical History:   Procedure Laterality Date     BACK SURGERY  2009     BIOPSY BREAST  2011    benign     C APPENDECTOMY       C ENLARGE BREAST  2006     C NONSPECIFIC PROCEDURE  2-04    ventral hernia repair     C NONSPECIFIC PROCEDURE  12/29/11    Lumbar fusion L5-S1     C NONSPECIFIC PROCEDURE      cervical fusion C3     C/SECTION, CLASSICAL      x2     COLONOSCOPY  2005     COLONOSCOPY N/A 2/12/2018    Procedure: COLONOSCOPY;  COLONOSCOPY;  Surgeon: Ken Fair MD;  Location:  GI     HEAD & NECK SURGERY  2011     HERNIA REPAIR  2003     HYSTERECTOMY  1990    one ovary removed, h/o cervical cancer       Medications:  potassium chloride ER (K-TAB) 20 MEQ CR tablet, Take 1 tablet (20 mEq) by mouth 3 times daily  atenolol-chlorthalidone (TENORETIC) 50-25 MG tablet, Take 1 tablet by mouth daily  estradiol (ESTRACE) 1 MG tablet, Take 1 tablet (1 mg) by mouth daily  fish oil-omega-3 fatty acids (FISH OIL) 1000 MG capsule, Take 2 g by mouth daily.  GLUCOSAMINE HCL-MSM PO, DAILY  guaiFENesin-codeine (ROBITUSSIN AC) 100-10 MG/5ML solution, Take 5-10 mLs by mouth every 4 hours as needed for cough (Patient not taking: Reported on 12/4/2019)  Multiple Vitamin (MULTIVITAMIN OR), Take 1 tablet by mouth daily.  Naproxen Sodium (ALEVE) 220 MG capsule, Take 220 mg by mouth daily.  [facility] WORT PO,     No current facility-administered medications on file prior to visit.       No Known  Allergies    Social History     Occupational History     Not on file   Tobacco Use     Smoking status: Former Smoker     Packs/day: 1.00     Years: 2.00     Pack years: 2.00     Types: Cigarettes     Last attempt to quit: 2010     Years since quittin.3     Smokeless tobacco: Never Used     Tobacco comment: no use of tobacco products   Substance and Sexual Activity     Alcohol use: Yes     Comment: socially     Drug use: No     Sexual activity: Not Currently     Partners: Male     Birth control/protection: Surgical       Family History   Problem Relation Age of Onset     Gastrointestinal Disease Mother         kidney failure     Arthritis Mother      Cancer Mother         leukemia     Hypertension Mother 50     Breast Cancer Mother      Hypertension Maternal Grandmother 50     Arthritis Maternal Grandfather      Cerebrovascular Disease Maternal Grandfather      Cerebrovascular Disease Paternal Grandfather      Cardiovascular Paternal Grandmother 90        hardening of the arteries     Cancer - colorectal Paternal Grandmother      Hypertension Brother 50     Hypertension Sister 50     Allergies Daughter      Allergies Daughter      Allergies Daughter      Macular Degeneration Paternal Aunt         dry     Macular Degeneration Paternal Aunt         wet     Asthma No family hx of      C.A.D. No family hx of      Diabetes No family hx of      Prostate Cancer No family hx of      Alcohol/Drug No family hx of      Alzheimer Disease No family hx of      Anesthesia Reaction No family hx of      Blood Disease No family hx of      Circulatory No family hx of      Congenital Anomalies No family hx of      Connective Tissue Disorder No family hx of      Depression No family hx of      Eye Disorder No family hx of      Genetic Disorder No family hx of      Genitourinary Problems No family hx of      Gynecology No family hx of      Heart Disease No family hx of      Lipids No family hx of      Musculoskeletal Disorder No  "family hx of      Neurologic Disorder No family hx of      Obesity No family hx of      Osteoporosis No family hx of      Psychotic Disorder No family hx of      Thyroid Disease No family hx of      Respiratory No family hx of      Glaucoma No family hx of      REVIEW OF SYSTEMS  10 point review systems performed otherwise negative as noted as per history of present illness.    Physical Exam:  Vitals: /64   Ht 1.56 m (5' 1.42\")   Wt 63 kg (139 lb)   BMI 25.91 kg/m     BMI= Body mass index is 25.91 kg/m .    Constitutional: healthy, alert and no acute distress   Psychiatric: mentation appears normal and affect normal/bright  NEURO: no focal deficits, CMS intact left upper extremity   RESP: Normal with easy respirations and no use of accessory muscles to breathe, no audible wheezing or retractions  CV: +2 radial pulse and her hand is warm to palpation.   SKIN: No erythema, rashes, excoriation, or breakdown. No evidence of infection.   MUSCULOSKELETAL:    INSPECTION of left shoulder: No gross deformities, erythema, edema, ecchymosis, atrophy or fasciculations.     PALPATION: No tenderness to palpation of the AC joint, proximal biceps tendon, clavicle, lateral shoulder, posterior shoulder, trapezius area. No increased warmth noted.    ROM: Forward flexion to approximately 120 degrees contralateral shoulder goes to 180., abduction to approximately 130 degrees, external rotation to approximately 80 bilaterally, internal rotation to lumbar spine bilaterally.  All range of motion is without catching locking however the patient has quite a bit of pain especially with forward flexion and abduction.  She also has pain at maximal external and internal rotation     STRENGTH: 5 out of 5 , deltoid as well as plus 4 out of 5 internal and external rotation     SPECIAL TEST:  positive whipple done by Dr. Cole. Patient has a negative Neer test, negative Thayer sign, negative cross over test, negative belly press and " negative liftoff test, negative empty can and full can test, negative external rotator lag sign, negative drop test   GAIT: non-antalgic  Lymph: no palpable lymph nodes    Diagnostic Modalities:  X-rays of the left shoulder done today 3 views showing no fracture no dislocation no tumor however we do see a high riding humerus we also see degenerative joint disease of the glenohumeral joint.    Independent visualization of the images was performed.    Impression: 1.  Left shoulder possible rotator cuff tear.  2.  Left shoulder glenohumeral degenerative joint disease, moderate    Plan:  All of the above pertinent physical exam and imaging modalities findings was reviewed with Christiana.                                          CONSERVATIVE CARE:    Focused Plan:  This patient compensates pretty well however does have some spinatus weakness and decreased range of motion.  Her injury late spring most likely caused a rotator cuff injury/tear.  We will get an MRI to determine this.  She also has glenohumeral degenerative joint disease on top of this.  She could be a candidate for reverse total shoulder in the future.  We will get the MRI and see her back after that with Dr. Cole.  Go over a plan then.    Re-x-ray on return: No    BP Readings from Last 1 Encounters:   12/04/19 122/64       BP noted to be well controlled today in office.      Patient does not use Tobacco products.      Scribed by Harrison Borges PA-C on 12/4/2019 at 2:46 PM, based on Dr. Nura Cole's statements to me.    This note was dictated with [employer].    BRENDA Chacon MD        Again, thank you for allowing me to participate in the care of your patient.        Sincerely,        Nura Cole MD

## 2019-12-04 NOTE — PROGRESS NOTES
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  She does distance horse riding where she rides for about 9 hours.  Her whole family is involved with horses.    She is being seen for   Chief Complaints and History of Present Illnesses   Patient presents with     Shoulder Pain     left shoulder pain     Consult         History of Present Illness:   Mechanism of Injury: In the late spring 1 of her horses was spooked and jumped and that pulled her shoulder.  After that she has had difficulty doing can get away from her body.  Location: Left lateral shoulder  Duration of Pain: Approximately 8 months  Rating of Pain: Moderate  Pain Quality: Achy but can be sharp  Pain is better with: Aleve and rest  Pain is worse with: Activity away from the body and above shoulder level.  Treatment so far consists of: Exercises that she does on her own, Tylenol at night and Aleve in the morning.  These helped slightly.  She has not had any injections or any formal therapy.  She has tried to rest it..   Associated Features: Difficulty with using the arm above shoulder level and away from body.  Difficulty with internal rotation and supination, weakness with the shoulder.  Denies numbness or tingling.  Every once while she gets her radicular type symptoms but that has been going on for a while and does not seem new.  This does wake her at night.  Prior history of related problems: No previous major surgery trauma or injury to the left shoulder  Pain is Limiting: Activity with the left shoulder especially with from the body and above shoulder level  Here to: Orthopedic consultation  The Pain Has: Gotten worse  Additional History: Patient compensates very well because she is strong however there has been a definite change since her injury in late spring with a horse.      Patient's  past medical, surgical, social and family histories reviewed.     Past Medical History:   Diagnosis Date     Arthritis     ostio     Cervix cancer (H) 1990     Depression     after 's death     History of left breast biopsy 2011    negative     HTN      UTI     history of         Past Surgical History:   Procedure Laterality Date     BACK SURGERY  2009     BIOPSY BREAST  2011    benign     C APPENDECTOMY       C ENLARGE BREAST  2006     C NONSPECIFIC PROCEDURE  2-04    ventral hernia repair     C NONSPECIFIC PROCEDURE  12/29/11    Lumbar fusion L5-S1     C NONSPECIFIC PROCEDURE      cervical fusion C3     C/SECTION, CLASSICAL      x2     COLONOSCOPY  2005     COLONOSCOPY N/A 2/12/2018    Procedure: COLONOSCOPY;  COLONOSCOPY;  Surgeon: Ken Fair MD;  Location:  GI     HEAD & NECK SURGERY  2011     HERNIA REPAIR  2003     HYSTERECTOMY  1990    one ovary removed, h/o cervical cancer       Medications:  potassium chloride ER (K-TAB) 20 MEQ CR tablet, Take 1 tablet (20 mEq) by mouth 3 times daily  atenolol-chlorthalidone (TENORETIC) 50-25 MG tablet, Take 1 tablet by mouth daily  estradiol (ESTRACE) 1 MG tablet, Take 1 tablet (1 mg) by mouth daily  fish oil-omega-3 fatty acids (FISH OIL) 1000 MG capsule, Take 2 g by mouth daily.  GLUCOSAMINE HCL-MSM PO, DAILY  guaiFENesin-codeine (ROBITUSSIN AC) 100-10 MG/5ML solution, Take 5-10 mLs by mouth every 4 hours as needed for cough (Patient not taking: Reported on 12/4/2019)  Multiple Vitamin (MULTIVITAMIN OR), Take 1 tablet by mouth daily.  Naproxen Sodium (ALEVE) 220 MG capsule, Take 220 mg by mouth daily.  [facility] PO,     No current facility-administered medications on file prior to visit.       No Known Allergies    Social History     Occupational History     Not on file   Tobacco Use     Smoking status: Former Smoker     Packs/day: 1.00     Years: 2.00     Pack years: 2.00     Types: Cigarettes     Last attempt to quit: 8/1/2010     Years since  quittin.3     Smokeless tobacco: Never Used     Tobacco comment: no use of tobacco products   Substance and Sexual Activity     Alcohol use: Yes     Comment: socially     Drug use: No     Sexual activity: Not Currently     Partners: Male     Birth control/protection: Surgical       Family History   Problem Relation Age of Onset     Gastrointestinal Disease Mother         kidney failure     Arthritis Mother      Cancer Mother         leukemia     Hypertension Mother 50     Breast Cancer Mother      Hypertension Maternal Grandmother 50     Arthritis Maternal Grandfather      Cerebrovascular Disease Maternal Grandfather      Cerebrovascular Disease Paternal Grandfather      Cardiovascular Paternal Grandmother 90        hardening of the arteries     Cancer - colorectal Paternal Grandmother      Hypertension Brother 50     Hypertension Sister 50     Allergies Daughter      Allergies Daughter      Allergies Daughter      Macular Degeneration Paternal Aunt         dry     Macular Degeneration Paternal Aunt         wet     Asthma No family hx of      C.A.D. No family hx of      Diabetes No family hx of      Prostate Cancer No family hx of      Alcohol/Drug No family hx of      Alzheimer Disease No family hx of      Anesthesia Reaction No family hx of      Blood Disease No family hx of      Circulatory No family hx of      Congenital Anomalies No family hx of      Connective Tissue Disorder No family hx of      Depression No family hx of      Eye Disorder No family hx of      Genetic Disorder No family hx of      Genitourinary Problems No family hx of      Gynecology No family hx of      Heart Disease No family hx of      Lipids No family hx of      Musculoskeletal Disorder No family hx of      Neurologic Disorder No family hx of      Obesity No family hx of      Osteoporosis No family hx of      Psychotic Disorder No family hx of      Thyroid Disease No family hx of      Respiratory No family hx of      Glaucoma No  "family hx of      REVIEW OF SYSTEMS  10 point review systems performed otherwise negative as noted as per history of present illness.    Physical Exam:  Vitals: /64   Ht 1.56 m (5' 1.42\")   Wt 63 kg (139 lb)   BMI 25.91 kg/m    BMI= Body mass index is 25.91 kg/m .    Constitutional: healthy, alert and no acute distress   Psychiatric: mentation appears normal and affect normal/bright  NEURO: no focal deficits, CMS intact left upper extremity   RESP: Normal with easy respirations and no use of accessory muscles to breathe, no audible wheezing or retractions  CV: +2 radial pulse and her hand is warm to palpation.   SKIN: No erythema, rashes, excoriation, or breakdown. No evidence of infection.   MUSCULOSKELETAL:    INSPECTION of left shoulder: No gross deformities, erythema, edema, ecchymosis, atrophy or fasciculations.     PALPATION: No tenderness to palpation of the AC joint, proximal biceps tendon, clavicle, lateral shoulder, posterior shoulder, trapezius area. No increased warmth noted.    ROM: Forward flexion to approximately 120 degrees contralateral shoulder goes to 180., abduction to approximately 130 degrees, external rotation to approximately 80 bilaterally, internal rotation to lumbar spine bilaterally.  All range of motion is without catching locking however the patient has quite a bit of pain especially with forward flexion and abduction.  She also has pain at maximal external and internal rotation     STRENGTH: 5 out of 5 , deltoid as well as plus 4 out of 5 internal and external rotation     SPECIAL TEST:  positive whipple done by Dr. Cole. Patient has a negative Neer test, negative Thayer sign, negative cross over test, negative belly press and negative liftoff test, negative empty can and full can test, negative external rotator lag sign, negative drop test   GAIT: non-antalgic  Lymph: no palpable lymph nodes    Diagnostic Modalities:  X-rays of the left shoulder done today 3 views " showing no fracture no dislocation no tumor however we do see a high riding humerus we also see degenerative joint disease of the glenohumeral joint.    Independent visualization of the images was performed.    Impression: 1.  Left shoulder possible rotator cuff tear.  2.  Left shoulder glenohumeral degenerative joint disease, moderate    Plan:  All of the above pertinent physical exam and imaging modalities findings was reviewed with Christiana.                                          CONSERVATIVE CARE:    Focused Plan:  This patient compensates pretty well however does have some spinatus weakness and decreased range of motion.  Her injury late spring most likely caused a rotator cuff injury/tear.  We will get an MRI to determine this.  She also has glenohumeral degenerative joint disease on top of this.  She could be a candidate for reverse total shoulder in the future.  We will get the MRI and see her back after that with Dr. Cole.  Go over a plan then.    Re-x-ray on return: No    BP Readings from Last 1 Encounters:   12/04/19 122/64       BP noted to be well controlled today in office.      Patient does not use Tobacco products.      Scribed by Harrison Borges PA-C on 12/4/2019 at 2:46 PM, based on Dr. Nura Cole's statements to me.    This note was dictated with [employer].    BRENDA Chacon MD

## 2019-12-10 ENCOUNTER — HOSPITAL ENCOUNTER (OUTPATIENT)
Dept: MRI IMAGING | Facility: CLINIC | Age: 64
Discharge: HOME | End: 2019-12-10
Attending: PHYSICIAN ASSISTANT | Admitting: PHYSICIAN ASSISTANT
Payer: COMMERCIAL

## 2019-12-10 DIAGNOSIS — S46.012A TRAUMATIC TEAR OF LEFT ROTATOR CUFF, UNSPECIFIED TEAR EXTENT, INITIAL ENCOUNTER: ICD-10-CM

## 2019-12-10 PROCEDURE — 73221 MRI JOINT UPR EXTREM W/O DYE: CPT | Mod: LT

## 2019-12-13 ENCOUNTER — OFFICE VISIT (OUTPATIENT)
Dept: ORTHOPEDICS | Facility: OTHER | Age: 64
End: 2019-12-13
Payer: COMMERCIAL

## 2019-12-13 VITALS
WEIGHT: 139 LBS | DIASTOLIC BLOOD PRESSURE: 70 MMHG | TEMPERATURE: 97.8 F | SYSTOLIC BLOOD PRESSURE: 124 MMHG | HEIGHT: 61 IN | BODY MASS INDEX: 26.24 KG/M2

## 2019-12-13 DIAGNOSIS — M19.012 PRIMARY OSTEOARTHRITIS OF LEFT SHOULDER: Primary | ICD-10-CM

## 2019-12-13 PROCEDURE — 99213 OFFICE O/P EST LOW 20 MIN: CPT | Performed by: ORTHOPAEDIC SURGERY

## 2019-12-13 RX ORDER — MELOXICAM 7.5 MG/1
7.5 TABLET ORAL DAILY
Qty: 30 TABLET | Refills: 1 | Status: SHIPPED | OUTPATIENT
Start: 2019-12-13 | End: 2020-01-07

## 2019-12-13 ASSESSMENT — MIFFLIN-ST. JEOR: SCORE: 1114.54

## 2019-12-13 ASSESSMENT — PAIN SCALES - GENERAL: PAINLEVEL: NO PAIN (0)

## 2019-12-13 NOTE — PROGRESS NOTES
Assessment / Plan     ICD-10-CM    1. Primary osteoarthritis of left shoulder M19.012 meloxicam (MOBIC) 7.5 MG tablet        Plan: We will have her try Mobic 7.5 mg for 2 weeks if there is inadequate relief with this we can increase it to 15 mg a day.  We did discuss the possibility of biceps tenodesis as well as region of tendon placement however I think her arthritis is going to limit her.  Also discussed total shoulder arthroplasty.  We will see her back if she has no improvement with the Mobic and likely give her an injection at that time.  No follow-ups on file.    HPI / History / ROS   Christiana is a 64 year old female who is being seen for   Chief Complaint   Patient presents with     RECHECK     MRI results, left shoulder   She is here for follow-up on her MRI which shows intrasubstance tearing of the supraspinatus.  In addition she has biceps and labrum pathology.  However no full-thickness rotator cuff tear.  Is not had any other changes since we last saw her.  She does want to be very active on is involved in riding horses and competing.  She like to be able to continue that and needs her motion for this.    Medications  atenolol-chlorthalidone (TENORETIC) 50-25 MG tablet, Take 1 tablet by mouth daily  estradiol (ESTRACE) 1 MG tablet, Take 1 tablet (1 mg) by mouth daily  fish oil-omega-3 fatty acids (FISH OIL) 1000 MG capsule, Take 2 g by mouth daily.  GLUCOSAMINE HCL-MSM PO, DAILY  Multiple Vitamin (MULTIVITAMIN OR), Take 1 tablet by mouth daily.  Naproxen Sodium (ALEVE) 220 MG capsule, Take 220 mg by mouth daily.  potassium chloride ER (K-TAB) 20 MEQ CR tablet, Take 1 tablet (20 mEq) by mouth 3 times daily  [employer] WORT PO,   guaiFENesin-codeine (ROBITUSSIN AC) 100-10 MG/5ML solution, Take 5-10 mLs by mouth every 4 hours as needed for cough (Patient not taking: Reported on 12/4/2019)    No current facility-administered medications on file prior to visit.          Allergies   No Known Allergies      PFSH  Past Medical History:   Diagnosis Date     Arthritis     ostio     Cervix cancer (H) 1990     Depression     after 's death     History of left breast biopsy 2011    negative     HTN      UTI     history of        Past Surgical History:   Procedure Laterality Date     BACK SURGERY  2009     BIOPSY BREAST  2011    benign     C APPENDECTOMY       C ENLARGE BREAST  2006     C NONSPECIFIC PROCEDURE  2-04    ventral hernia repair     C NONSPECIFIC PROCEDURE  12/29/11    Lumbar fusion L5-S1     C NONSPECIFIC PROCEDURE      cervical fusion C3     C/SECTION, CLASSICAL      x2     COLONOSCOPY  2005     COLONOSCOPY N/A 2/12/2018    Procedure: COLONOSCOPY;  COLONOSCOPY;  Surgeon: Ken Fair MD;  Location:  GI     HEAD & NECK SURGERY  2011     HERNIA REPAIR  2003     HYSTERECTOMY  1990    one ovary removed, h/o cervical cancer       Family History   Problem Relation Age of Onset     Gastrointestinal Disease Mother         kidney failure     Arthritis Mother      Cancer Mother         leukemia     Hypertension Mother 50     Breast Cancer Mother      Hypertension Maternal Grandmother 50     Arthritis Maternal Grandfather      Cerebrovascular Disease Maternal Grandfather      Cerebrovascular Disease Paternal Grandfather      Cardiovascular Paternal Grandmother 90        hardening of the arteries     Cancer - colorectal Paternal Grandmother      Hypertension Brother 50     Hypertension Sister 50     Allergies Daughter      Allergies Daughter      Allergies Daughter      Macular Degeneration Paternal Aunt         dry     Macular Degeneration Paternal Aunt         wet     Asthma No family hx of      C.A.D. No family hx of      Diabetes No family hx of      Prostate Cancer No family hx of      Alcohol/Drug No family hx of      Alzheimer Disease No family hx of      Anesthesia Reaction No family hx of      Blood Disease No family hx of      Circulatory No family hx of      Congenital Anomalies No family hx of  "     Connective Tissue Disorder No family hx of      Depression No family hx of      Eye Disorder No family hx of      Genetic Disorder No family hx of      Genitourinary Problems No family hx of      Gynecology No family hx of      Heart Disease No family hx of      Lipids No family hx of      Musculoskeletal Disorder No family hx of      Neurologic Disorder No family hx of      Obesity No family hx of      Osteoporosis No family hx of      Psychotic Disorder No family hx of      Thyroid Disease No family hx of      Respiratory No family hx of      Glaucoma No family hx of        Social History     Tobacco Use     Smoking status: Former Smoker     Packs/day: 1.00     Years: 2.00     Pack years: 2.00     Types: Cigarettes     Last attempt to quit: 2010     Years since quittin.3     Smokeless tobacco: Never Used     Tobacco comment: no use of tobacco products   Substance Use Topics     Alcohol use: Yes     Comment: socially          ROS  Review of Systems   REVIEW OF SYSTEMS  General: negative for, night sweats, dizziness, fatigue  Resp: No shortness of breath and no cough  CV: negative for chest pain, syncope or near-syncope  GI: negative for nausea, vomiting and diarrhea  : negative for dysuria and hematuria  Musculoskeletal: as above  Neurologic: negative for syncope   Hematologic: negative for bleeding disorder    Physical / Results   Physical Exam         Physical Exam:  Vitals: /70   Temp 97.8  F (36.6  C) (Temporal)   Ht 1.56 m (5' 1.42\")   Wt 63 kg (139 lb)   BMI 25.91 kg/m    BMI= Body mass index is 25.91 kg/m .  Constitutional: healthy, alert and no acute distress   Psychiatric: mentation appears normal and affect normal/bright  NEURO: no focal deficits  RESP: Normal with easy respirations and no use of accessory muscles to breathe, no audible wheezing or retractions  CV: Pulses are palpable  SKIN: Warm with no erythema  JOINT/EXTREMITIES:LeftRange of motion limited due to pain  normal " strength to supraspinatus, infraspinatus, and subscapularis  Whipple test Positive  proximal biceps tenderness Positive    IMAGING:   MRI was reviewed.  It does show changes in the supraspinatus area.  In addition there is labral pathology and biceps tendon pathology.  Significant osteoarthrosis is noted.  These images were independently reviewed.  Nura Cole MD

## 2019-12-13 NOTE — LETTER
12/13/2019         RE: Christiana Lopes  [address] [facility] [address]        Dear Colleague,    Thank you for referring your patient, Christiana Lopes, to the [facility]. Please see a copy of my visit note below.    Assessment / Plan     ICD-10-CM    1. Primary osteoarthritis of left shoulder M19.012 meloxicam (MOBIC) 7.5 MG tablet        Plan: We will have her try Mobic 7.5 mg for 2 weeks if there is inadequate relief with this we can increase it to 15 mg a day.  We did discuss the possibility of biceps tenodesis as well as region of tendon placement however I think her arthritis is going to limit her.  Also discussed total shoulder arthroplasty.  We will see her back if she has no improvement with the Mobic and likely give her an injection at that time.  No follow-ups on file.    HPI / History / ROS   Christiana is a 64 year old female who is being seen for   Chief Complaint   Patient presents with     RECHECK     MRI results, left shoulder   She is here for follow-up on her MRI which shows intrasubstance tearing of the supraspinatus.  In addition she has biceps and labrum pathology.  However no full-thickness rotator cuff tear.  Is not had any other changes since we last saw her.  She does want to be very active on is involved in riding horses and competing.  She like to be able to continue that and needs her motion for this.    Medications  atenolol-chlorthalidone (TENORETIC) 50-25 MG tablet, Take 1 tablet by mouth daily  estradiol (ESTRACE) 1 MG tablet, Take 1 tablet (1 mg) by mouth daily  fish oil-omega-3 fatty acids (FISH OIL) 1000 MG capsule, Take 2 g by mouth daily.  GLUCOSAMINE HCL-MSM PO, DAILY  Multiple Vitamin (MULTIVITAMIN OR), Take 1 tablet by mouth daily.  Naproxen Sodium (ALEVE) 220 MG capsule, Take 220 mg by mouth daily.  potassium chloride ER (K-TAB) 20 MEQ CR tablet, Take 1 tablet (20 mEq) by mouth 3 times daily  [employer] WORT PO,   guaiFENesin-codeine  (ROBITUSSIN AC) 100-10 MG/5ML solution, Take 5-10 mLs by mouth every 4 hours as needed for cough (Patient not taking: Reported on 12/4/2019)    No current facility-administered medications on file prior to visit.          Allergies   No Known Allergies     PFSH  Past Medical History:   Diagnosis Date     Arthritis     ostio     Cervix cancer (H) 1990     Depression     after 's death     History of left breast biopsy 2011    negative     HTN      UTI     history of        Past Surgical History:   Procedure Laterality Date     BACK SURGERY  2009     BIOPSY BREAST  2011    benign     C APPENDECTOMY       C ENLARGE BREAST  2006     C NONSPECIFIC PROCEDURE  2-04    ventral hernia repair     C NONSPECIFIC PROCEDURE  12/29/11    Lumbar fusion L5-S1     C NONSPECIFIC PROCEDURE      cervical fusion C3     C/SECTION, CLASSICAL      x2     COLONOSCOPY  2005     COLONOSCOPY N/A 2/12/2018    Procedure: COLONOSCOPY;  COLONOSCOPY;  Surgeon: Ken Fair MD;  Location:  GI     HEAD & NECK SURGERY  2011     HERNIA REPAIR  2003     HYSTERECTOMY  1990    one ovary removed, h/o cervical cancer       Family History   Problem Relation Age of Onset     Gastrointestinal Disease Mother         kidney failure     Arthritis Mother      Cancer Mother         leukemia     Hypertension Mother 50     Breast Cancer Mother      Hypertension Maternal Grandmother 50     Arthritis Maternal Grandfather      Cerebrovascular Disease Maternal Grandfather      Cerebrovascular Disease Paternal Grandfather      Cardiovascular Paternal Grandmother 90        hardening of the arteries     Cancer - colorectal Paternal Grandmother      Hypertension Brother 50     Hypertension Sister 50     Allergies Daughter      Allergies Daughter      Allergies Daughter      Macular Degeneration Paternal Aunt         dry     Macular Degeneration Paternal Aunt         wet     Asthma No family hx of      C.A.D. No family hx of      Diabetes No family hx of       "Prostate Cancer No family hx of      Alcohol/Drug No family hx of      Alzheimer Disease No family hx of      Anesthesia Reaction No family hx of      Blood Disease No family hx of      Circulatory No family hx of      Congenital Anomalies No family hx of      Connective Tissue Disorder No family hx of      Depression No family hx of      Eye Disorder No family hx of      Genetic Disorder No family hx of      Genitourinary Problems No family hx of      Gynecology No family hx of      Heart Disease No family hx of      Lipids No family hx of      Musculoskeletal Disorder No family hx of      Neurologic Disorder No family hx of      Obesity No family hx of      Osteoporosis No family hx of      Psychotic Disorder No family hx of      Thyroid Disease No family hx of      Respiratory No family hx of      Glaucoma No family hx of        Social History     Tobacco Use     Smoking status: Former Smoker     Packs/day: 1.00     Years: 2.00     Pack years: 2.00     Types: Cigarettes     Last attempt to quit: 2010     Years since quittin.3     Smokeless tobacco: Never Used     Tobacco comment: no use of tobacco products   Substance Use Topics     Alcohol use: Yes     Comment: socially          ROS  Review of Systems   REVIEW OF SYSTEMS  General: negative for, night sweats, dizziness, fatigue  Resp: No shortness of breath and no cough  CV: negative for chest pain, syncope or near-syncope  GI: negative for nausea, vomiting and diarrhea  : negative for dysuria and hematuria  Musculoskeletal: as above  Neurologic: negative for syncope   Hematologic: negative for bleeding disorder    Physical / Results   Physical Exam         Physical Exam:  Vitals: /70   Temp 97.8  F (36.6  C) (Temporal)   Ht 1.56 m (5' 1.42\")   Wt 63 kg (139 lb)   BMI 25.91 kg/m     BMI= Body mass index is 25.91 kg/m .  Constitutional: healthy, alert and no acute distress   Psychiatric: mentation appears normal and affect normal/bright  NEURO: " no focal deficits  RESP: Normal with easy respirations and no use of accessory muscles to breathe, no audible wheezing or retractions  CV: Pulses are palpable  SKIN: Warm with no erythema  JOINT/EXTREMITIES:LeftRange of motion limited due to pain  normal strength to supraspinatus, infraspinatus, and subscapularis  Whipple test Positive  proximal biceps tenderness Positive    IMAGING:   MRI was reviewed.  It does show changes in the supraspinatus area.  In addition there is labral pathology and biceps tendon pathology.  Significant osteoarthrosis is noted.  These images were independently reviewed.  Nura Cole MD    Again, thank you for allowing me to participate in the care of your patient.        Sincerely,        Nura Cole MD

## 2019-12-14 ENCOUNTER — MYC REFILL (OUTPATIENT)
Dept: FAMILY MEDICINE | Facility: OTHER | Age: 64
End: 2019-12-14

## 2019-12-14 DIAGNOSIS — I10 HYPERTENSION GOAL BP (BLOOD PRESSURE) < 140/90: ICD-10-CM

## 2019-12-14 DIAGNOSIS — N95.1 MENOPAUSAL SYNDROME (HOT FLASHES): ICD-10-CM

## 2019-12-16 RX ORDER — ATENOLOL AND CHLORTHALIDONE TABLET 50; 25 MG/1; MG/1
1 TABLET ORAL DAILY
Qty: 90 TABLET | Refills: 0 | Status: SHIPPED | OUTPATIENT
Start: 2019-12-16 | End: 2020-05-19

## 2019-12-16 RX ORDER — POTASSIUM CHLORIDE 1500 MG/1
20 TABLET, EXTENDED RELEASE ORAL 3 TIMES DAILY
Qty: 270 TABLET | Refills: 0 | Status: SHIPPED | OUTPATIENT
Start: 2019-12-16 | End: 2020-05-19

## 2019-12-16 RX ORDER — ESTRADIOL 1 MG/1
1 TABLET ORAL DAILY
Qty: 90 TABLET | Refills: 0 | Status: SHIPPED | OUTPATIENT
Start: 2019-12-16 | End: 2020-05-19

## 2020-01-04 ENCOUNTER — MYC MEDICAL ADVICE (OUTPATIENT)
Dept: ORTHOPEDICS | Facility: OTHER | Age: 65
End: 2020-01-04

## 2020-01-07 ENCOUNTER — MYC MEDICAL ADVICE (OUTPATIENT)
Dept: ORTHOPEDICS | Facility: OTHER | Age: 65
End: 2020-01-07

## 2020-01-07 DIAGNOSIS — M19.012 PRIMARY OSTEOARTHRITIS OF LEFT SHOULDER: ICD-10-CM

## 2020-01-07 RX ORDER — MELOXICAM 7.5 MG/1
15 TABLET ORAL DAILY
Qty: 30 TABLET | Refills: 1 | Status: SHIPPED | OUTPATIENT
Start: 2020-01-07 | End: 2020-02-18

## 2020-01-07 NOTE — TELEPHONE ENCOUNTER
It looks like she received a 2-month supply of the medication previously.  She could just double up on the medication that she has now.  I will call and double check this with her.    I talked to the patient and she has not did the refill yet, and was hoping instead of the refill she could get the new dose.  I filled this.  She is happy.  I answered all her questions about surgery, injection and meds.

## 2020-01-07 NOTE — TELEPHONE ENCOUNTER
See last mychart message from DONALD Schofield,     I reviewed Dr. Cole's last note from 12/13/19 when you saw him for your shoulder issues.  According to his last note: We will have her try Mobic 7.5 mg for 2 weeks if there is inadequate relief with this we can increase it to 15 mg a day.  We did discuss the possibility of biceps tenodesis as well as region of tendon placement however I think her arthritis is going to limit her.  Also discussed total shoulder arthroplasty.  We will see her back if she has no improvement with the Mobic and likely give her an injection at that time.     At this time since you did not have improvement on the medication we can either increase the strength or try a cortisone injection in the shoulder.  If you would like to try the injection you can schedule a follow up visit with Douglas Duncan.  If you would like to try the higher strength medication please let us know.     Thank you,     DONALD Rivero, do you feel comfortable sending the meloxicam 15 mg for the patient or defer to Dr. Cole when he returns?     Aleksandra GARCIA, Lead RN, BSN. . .  1/7/2020, 10:39 AM

## 2020-01-13 ENCOUNTER — MYC MEDICAL ADVICE (OUTPATIENT)
Dept: ORTHOPEDICS | Facility: OTHER | Age: 65
End: 2020-01-13

## 2020-01-21 NOTE — TELEPHONE ENCOUNTER
"Per Dr. Cole's note on 12/13/19:    \"Plan: We will have her try Mobic 7.5 mg for 2 weeks if there is inadequate relief with this we can increase it to 15 mg a day.  We did discuss the possibility of biceps tenodesis as well as region of tendon placement however I think her arthritis is going to limit her.  Also discussed total shoulder arthroplasty.  We will see her back if she has no improvement with the Mobic and likely give her an injection at that time.\"    Sounds like he would have no problem with setting up for shoulder replacement.  Probably would be best to have her come in for discussion of this prior to set up to assure we have the right surgery and equipment planned for her personal situation.  Dr. Cole is out this week.  "

## 2020-01-29 ENCOUNTER — OFFICE VISIT (OUTPATIENT)
Dept: ORTHOPEDICS | Facility: OTHER | Age: 65
End: 2020-01-29
Payer: COMMERCIAL

## 2020-01-29 ENCOUNTER — TELEPHONE (OUTPATIENT)
Dept: ORTHOPEDICS | Facility: CLINIC | Age: 65
End: 2020-01-29

## 2020-01-29 VITALS
DIASTOLIC BLOOD PRESSURE: 88 MMHG | HEIGHT: 61 IN | SYSTOLIC BLOOD PRESSURE: 150 MMHG | WEIGHT: 136.5 LBS | BODY MASS INDEX: 25.77 KG/M2

## 2020-01-29 DIAGNOSIS — M19.012 PRIMARY OSTEOARTHRITIS OF LEFT SHOULDER: Primary | ICD-10-CM

## 2020-01-29 PROCEDURE — 99214 OFFICE O/P EST MOD 30 MIN: CPT | Performed by: ORTHOPAEDIC SURGERY

## 2020-01-29 ASSESSMENT — MIFFLIN-ST. JEOR: SCORE: 1103.2

## 2020-01-29 NOTE — PROGRESS NOTES
Karen to follow up with Primary Care provider regarding elevated blood pressure.  Patient states mother just passed yesterday, aware of BP readiing

## 2020-01-29 NOTE — TELEPHONE ENCOUNTER
Type of surgery: left shoulder hemiarthroplasty verus total shoulder  Location of surgery: [facility]   Date of surgery: 2/12/20  Surgeon: Dr. Cole  Pre-Op Appt Date: 2/4/20  Post-Op Appt Date: 2/26/20   Packet sent out: Surgery packet mailed to patient's home address.   Pre-cert/Authorization completed: NA  Date: 1/29/2020    Zakia Jules  Surgery Scheduler

## 2020-01-29 NOTE — LETTER
1/29/2020         RE: Christiana Lopes  [address]  [place] [address]        Dear Colleague,    Thank you for referring your patient, Christiana Lopes, to the [facility]. Please see a copy of my visit note below.    Karen to follow up with Primary Care provider regarding elevated blood pressure.  Patient states mother just passed yesterday, aware of BP readiing    Assessment / Plan     ICD-10-CM    1. Primary osteoarthritis of left shoulder M19.012 Case Request: Left shoulder hemiarthroplasy vs total shoulder     Case Request: Left shoulder hemiarthroplasy vs total shoulder     CTA Upper Extremity Left Runoff with Contrast        Plan:   The risks, benefits, alternatives, complication, limitations, and expectations were reviewed at length. Complication such as infection, bleeding, fracture, blood clots, cartilage injury, hardware failure, tendon re-tears, and nerve damage were reviewed. Surgery done under general anesthesia also includes risks of aspiration, strokes, heart attacks,  and death which was reviewed. Nerve block was also discussed.      With any shoulder replacement limitation would be in place post-operatively. This generally includes no forward flexion and external rotation for a period of time after the surgery.  These are a general time reference and may change depending on the pathology and fixation.     All questions were answered.Together through a combined decision making approach we have decided on a total shoulder replacement.  Will will use a CT scan for preoperative planning and possibly use patient specific instrumentation.  The patient's past medical and surgical history was reviewed; The patient will need a preoperative medical evaluation and clearance.  No follow-ups on file.    HPI / History / ROS   Christiana is a 64 year old female who is being seen for   Chief Complaint   Patient presents with     RECHECK     left shoulder   She continues to have  significant problems especially when she reaches for things pain is mostly with range of motion.  She says that she is significantly limited with the range of motion.  We did discuss arthroscopic treatment versus total shoulder arthroplasty.  At this point she wants to continue an active lifestyle.  We discussed the differences between hemiarthroplasty resurfacing and total shoulder arthroplasty.  In addition she would require biceps tenodesis.    Medications  atenolol-chlorthalidone (TENORETIC) 50-25 MG tablet, Take 1 tablet by mouth daily  estradiol (ESTRACE) 1 MG tablet, Take 1 tablet (1 mg) by mouth daily  fish oil-omega-3 fatty acids (FISH OIL) 1000 MG capsule, Take 2 g by mouth daily.  GLUCOSAMINE HCL-MSM PO, DAILY  guaiFENesin-codeine (ROBITUSSIN AC) 100-10 MG/5ML solution, Take 5-10 mLs by mouth every 4 hours as needed for cough (Patient not taking: Reported on 12/4/2019)  meloxicam (MOBIC) 7.5 MG tablet, Take 2 tablets (15 mg) by mouth daily  Multiple Vitamin (MULTIVITAMIN OR), Take 1 tablet by mouth daily.  Naproxen Sodium (ALEVE) 220 MG capsule, Take 220 mg by mouth daily.  potassium chloride ER (K-TAB) 20 MEQ CR tablet, Take 1 tablet (20 mEq) by mouth 3 times daily  ST JOHNS WORT PO,     No current facility-administered medications on file prior to visit.          Allergies   No Known Allergies     PFSH  Past Medical History:   Diagnosis Date     Arthritis     ostio     Cervix cancer (H) 1990     Depression     after 's death     History of left breast biopsy 2011    negative     HTN      UTI     history of        Past Surgical History:   Procedure Laterality Date     BACK SURGERY  2009     BIOPSY BREAST  2011    benign     C APPENDECTOMY       C ENLARGE BREAST  2006     C NONSPECIFIC PROCEDURE  2-04    ventral hernia repair     C NONSPECIFIC PROCEDURE  12/29/11    Lumbar fusion L5-S1     C NONSPECIFIC PROCEDURE      cervical fusion C3     C/SECTION, CLASSICAL      x2     COLONOSCOPY  2005      COLONOSCOPY N/A 2/12/2018    Procedure: COLONOSCOPY;  COLONOSCOPY;  Surgeon: Ken Fair MD;  Location: [facility]     HEAD & NECK SURGERY  2011     HERNIA REPAIR  2003     HYSTERECTOMY  1990    one ovary removed, h/o cervical cancer       Family History   Problem Relation Age of Onset     Gastrointestinal Disease Mother         kidney failure     Arthritis Mother      Cancer Mother         leukemia     Hypertension Mother 50     Breast Cancer Mother      Hypertension Maternal Grandmother 50     Arthritis Maternal Grandfather      Cerebrovascular Disease Maternal Grandfather      Cerebrovascular Disease Paternal Grandfather      Cardiovascular Paternal Grandmother 90        hardening of the arteries     Cancer - colorectal Paternal Grandmother      Hypertension Brother 50     Hypertension Sister 50     Allergies Daughter      Allergies Daughter      Allergies Daughter      Macular Degeneration Paternal Aunt         dry     Macular Degeneration Paternal Aunt         wet     Asthma No family hx of      C.A.D. No family hx of      Diabetes No family hx of      Prostate Cancer No family hx of      Alcohol/Drug No family hx of      Alzheimer Disease No family hx of      Anesthesia Reaction No family hx of      Blood Disease No family hx of      Circulatory No family hx of      Congenital Anomalies No family hx of      Connective Tissue Disorder No family hx of      Depression No family hx of      Eye Disorder No family hx of      Genetic Disorder No family hx of      Genitourinary Problems No family hx of      Gynecology No family hx of      Heart Disease No family hx of      Lipids No family hx of      Musculoskeletal Disorder No family hx of      Neurologic Disorder No family hx of      Obesity No family hx of      Osteoporosis No family hx of      Psychotic Disorder No family hx of      Thyroid Disease No family hx of      Respiratory No family hx of      Glaucoma No family hx of        Social History     Tobacco  "Use     Smoking status: Former Smoker     Packs/day: 1.00     Years: 2.00     Pack years: 2.00     Types: Cigarettes     Last attempt to quit: 2010     Years since quittin.5     Smokeless tobacco: Never Used     Tobacco comment: no use of tobacco products   Substance Use Topics     Alcohol use: Yes     Comment: socially          ROS  Review of Systems   REVIEW OF SYSTEMS  General: negative for, night sweats, dizziness, fatigue  Resp: No shortness of breath and no cough  CV: negative for chest pain, syncope or near-syncope  GI: negative for nausea, vomiting and diarrhea  : negative for dysuria and hematuria  Musculoskeletal: as above  Neurologic: negative for syncope   Hematologic: negative for bleeding disorder    Physical / Results   Physical Exam         Physical Exam:  Vitals: BP (!) 150/88   Ht 1.56 m (5' 1.42\")   Wt 61.9 kg (136 lb 8 oz)   BMI 25.44 kg/m     BMI= Body mass index is 25.44 kg/m .  Constitutional: healthy, alert and no acute distress   Psychiatric: mentation appears normal and affect normal/bright  NEURO: no focal deficits  RESP: Normal with easy respirations and no use of accessory muscles to breathe, no audible wheezing or retractions  CV: Pulses are palpable  SKIN: Warm with no erythema  JOINT/EXTREMITIES:LeftRange of motion limited due to pain  normal strength to supraspinatus, infraspinatus, and subscapularis  proximal biceps tenderness Positive      These images were independently reviewed.  Nura Cole MD    Again, thank you for allowing me to participate in the care of your patient.        Sincerely,        Nura Cole MD    "

## 2020-01-30 NOTE — PROGRESS NOTES
[address]  [facility] [address]  [phone]  Dept: [phone]    PRE-OP EVALUATION:  Today's date: 2020    Christiana Lopes (: 1955) presents for pre-operative evaluation assessment as requested by Dr. Cole.  She requires evaluation and anesthesia risk assessment prior to undergoing surgery/procedure for treatment of arthritis of left shoulder .    Fax number for surgical facility: available electronically   Primary Physician: Jigna Lees  Type of Anesthesia Anticipated: General    Patient has a Health Care Directive or Living Will:  NO    Preop Questions 2020   Who is doing your surgery? Dr Nura Cole   What are you having done? shoulder surgery   Date of Surgery/Procedure: 2020   Facility or Hospital where procedure/surgery will be performed: [facility]   1.  Do you have a history of Heart attack, stroke, stent, coronary bypass surgery, or other heart surgery? No   2.  Do you ever have any pain or discomfort in your chest? No   3.  Do you have a history of  Heart Failure? No   4.   Are you troubled by shortness of breath when:  walking on a level surface, or up a slight hill, or at night? No   5.  Do you currently have a cold, bronchitis or other respiratory infection? No   6.  Do you have a cough, shortness of breath, or wheezing? No   7.  Do you sometimes get pains in the calves of your legs when you walk? No   8. Do you or anyone in your family have previous history of blood clots? YES - father   9.  Do you or does anyone in your family have a serious bleeding problem such as prolonged bleeding following surgeries or cuts? No   10. Have you ever had problems with anemia or been told to take iron pills? No   11. Have you had any abnormal blood loss such as black, tarry or bloody stools, or abnormal vaginal bleeding? No   12. Have you ever had a blood transfusion? YES - heavy periods, no reaction   13. Have you or  "any of your relatives ever had problems with anesthesia? No   14. Do you have sleep apnea, excessive snoring or daytime drowsiness? No   15. Do you have any prosthetic heart valves? No   16. Do you have prosthetic joints? No   17. Is there any chance that you may be pregnant? No         HPI:     HPI related to upcoming procedure: left shoulder pain worsening over the last year      HYPERTENSION - Patient has longstanding history of HTN , currently denies any symptoms referable to elevated blood pressure. Specifically denies chest pain, palpitations, dyspnea, orthopnea, PND or peripheral edema. Blood pressure readings have been in normal range. Current medication regimen is as listed below. Patient denies any side effects of medication.       MEDICAL HISTORY:     Patient Active Problem List    Diagnosis Date Noted     Primary osteoarthritis of left shoulder 01/29/2020     Priority: Medium     Added automatically from request for surgery [id number]       URI with cough and congestion 08/23/2019     Priority: Medium     History of cervical cancer 06/26/2013     Priority: Medium     1990 Hysterectomy due to cervical cancer. She was told it went deeper than they expected but they got it all.  10/27/09 NIL pap. Exam notes report, \"Cervix is without inflammation or abnormal discharge\". Continue cervical cancer screening.   8/25/11 NIL pap/neg HR HPV  6/26/13 NIL pap  11/8/16 NIL pap/neg HR HPV.          Advanced directives, counseling/discussion 08/30/2012     Priority: Medium     Discussed advance care planning with patient; however, patient declined at this time. 8/30/2012          Hypertension goal BP (blood pressure) < 140/90 12/12/2010     Priority: Medium      Past Medical History:   Diagnosis Date     Arthritis     ostio     Cervix cancer (H) 1990     Depression     after 's death     History of left breast biopsy 2011    negative     HTN      UTI     history of      Past Surgical History:   Procedure Laterality " Date     BACK SURGERY  2009     BIOPSY BREAST  2011    benign     C APPENDECTOMY       C ENLARGE BREAST  2006     C NONSPECIFIC PROCEDURE  2-04    ventral hernia repair     C NONSPECIFIC PROCEDURE  11    Lumbar fusion L5-S1     C NONSPECIFIC PROCEDURE      cervical fusion C3     C/SECTION, CLASSICAL      x2     COLONOSCOPY       COLONOSCOPY N/A 2018    Procedure: COLONOSCOPY;  COLONOSCOPY;  Surgeon: Ken Fair MD;  Location:  GI     HEAD & NECK SURGERY       HERNIA REPAIR       HYSTERECTOMY      one ovary removed, h/o cervical cancer     Current Outpatient Medications   Medication Sig Dispense Refill     atenolol-chlorthalidone (TENORETIC) 50-25 MG tablet Take 1 tablet by mouth daily 90 tablet 0     estradiol (ESTRACE) 1 MG tablet Take 1 tablet (1 mg) by mouth daily 90 tablet 0     fish oil-omega-3 fatty acids (FISH OIL) 1000 MG capsule Take 2 g by mouth daily.       MAGNESIUM PO        meloxicam (MOBIC) 7.5 MG tablet Take 2 tablets (15 mg) by mouth daily 30 tablet 1     Methylsulfonylmethane (MSM PO)        Multiple Vitamin (MULTIVITAMIN OR) Take 1 tablet by mouth daily.       potassium chloride ER (K-TAB) 20 MEQ CR tablet Take 1 tablet (20 mEq) by mouth 3 times daily 270 tablet 0     ST CHAMBERS WORT PO        Naproxen Sodium (ALEVE) 220 MG capsule Take 220 mg by mouth daily.       OTC products: None, except as noted above    No Known Allergies   Latex Allergy: NO    Social History     Tobacco Use     Smoking status: Former Smoker     Packs/day: 1.00     Years: 2.00     Pack years: 2.00     Types: Cigarettes     Last attempt to quit: 2010     Years since quittin.5     Smokeless tobacco: Never Used     Tobacco comment: no use of tobacco products   Substance Use Topics     Alcohol use: Yes     Comment: socially     History   Drug Use No       REVIEW OF SYSTEMS:   CONSTITUTIONAL: NEGATIVE for fever, chills, change in weight  INTEGUMENTARY/SKIN: NEGATIVE for worrisome rashes,  "moles or lesions  EYES: NEGATIVE for vision changes or irritation  ENT/MOUTH: NEGATIVE for ear, mouth and throat problems  RESP: NEGATIVE for significant cough or shortness of breath   CV: NEGATIVE for chest pain, palpitations or peripheral edema  GI: NEGATIVE for nausea, abdominal pain, heartburn, or change in bowel habits  : NEGATIVE for frequency, dysuria, or hematuria  MUSCULOSKELETAL: left shoulder pain  NEURO: NEGATIVE for weakness, dizziness or paresthesias  ENDOCRINE: NEGATIVE for temperature intolerance, skin/hair changes  HEME: NEGATIVE for bleeding problems  PSYCHIATRIC: NEGATIVE for changes in mood or affect    EXAM:   /74   Pulse 60   Temp 98.3  F (36.8  C) (Temporal)   Ht 1.555 m (5' 1.22\")   Wt 62.4 kg (137 lb 8 oz)   SpO2 97%   BMI 25.79 kg/m      GENERAL APPEARANCE: healthy, alert and no distress     EYES: EOMI, PERRL     HENT: ear canals and TM's normal and nose and mouth without ulcers or lesions     NECK: no adenopathy, no asymmetry, masses, or scars and thyroid normal to palpation     RESP: lungs clear to auscultation - no rales, rhonchi or wheezes     CV: regular rates and rhythm, normal S1 S2, no S3 or S4 and no murmur, click or rub     ABDOMEN:  soft, nontender, no HSM or masses and bowel sounds normal     MS: decreased range of motion left shoulder     SKIN: no suspicious lesions or rashes     NEURO: Normal strength and tone, sensory exam grossly normal, mentation intact and speech normal     PSYCH: mentation appears normal. and affect normal/bright     LYMPHATICS: No cervical adenopathy    DIAGNOSTICS:   EKG: Normal Sinus Rhythm, normal axis, normal intervals, no acute ST/T changes c/w ischemia, no LVH by voltage criteria    Recent Labs   Lab Test 03/01/19  1357 01/12/18  1517  12/26/11  1757   HGB  --   --   --  12.8    138   < >  --    POTASSIUM 4.2 3.5   < > 3.7   CR 0.86 0.75   < > 1.87*    < > = values in this interval not displayed.        IMPRESSION:   Reason for " surgery/procedure: left shoulder pain  Diagnosis/reason for consult: hypertension     The proposed surgical procedure is considered INTERMEDIATE risk.    REVISED CARDIAC RISK INDEX  The patient has the following serious cardiovascular risks for perioperative complications such as (MI, PE, VFib and 3  AV Block):  No serious cardiac risks  INTERPRETATION: 0 risks: Class I (very low risk - 0.4% complication rate)    The patient has the following additional risks for perioperative complications:  No identified additional risks      ICD-10-CM    1. Preop general physical exam Z01.818    2. Primary osteoarthritis of left shoulder M19.012    3. Hypertension goal BP (blood pressure) < 140/90 I10 Basic metabolic panel     CBC with platelets     *UA reflex to Microscopic and Culture ([facility] (except [place] and [place])     EKG 12-lead complete w/read - Clinics       RECOMMENDATIONS:       --Patient is to take all scheduled medications on the day of surgery EXCEPT for modifications listed below.    Anticoagulant or Antiplatelet Medication Use  NSAIDS: Meloxicam (Mobic):      Stop 7 days prior to surgery        APPROVAL GIVEN to proceed with proposed procedure, without further diagnostic evaluation       Signed Electronically by: Jigna Lees MD    Copy of this evaluation report is provided to requesting physician.    [facility] Preop Guidelines    Revised Cardiac Risk Index

## 2020-01-30 NOTE — PROGRESS NOTES
Assessment / Plan     ICD-10-CM    1. Primary osteoarthritis of left shoulder M19.012 Case Request: Left shoulder hemiarthroplasy vs total shoulder     Case Request: Left shoulder hemiarthroplasy vs total shoulder     CTA Upper Extremity Left Runoff with Contrast        Plan:   The risks, benefits, alternatives, complication, limitations, and expectations were reviewed at length. Complication such as infection, bleeding, fracture, blood clots, cartilage injury, hardware failure, tendon re-tears, and nerve damage were reviewed. Surgery done under general anesthesia also includes risks of aspiration, strokes, heart attacks,  and death which was reviewed. Nerve block was also discussed.      With any shoulder replacement limitation would be in place post-operatively. This generally includes no forward flexion and external rotation for a period of time after the surgery.  These are a general time reference and may change depending on the pathology and fixation.     All questions were answered.Together through a combined decision making approach we have decided on a total shoulder replacement.  Will will use a CT scan for preoperative planning and possibly use patient specific instrumentation.  The patient's past medical and surgical history was reviewed; The patient will need a preoperative medical evaluation and clearance.  No follow-ups on file.    HPI / History / ROS   Christiana is a 64 year old female who is being seen for   Chief Complaint   Patient presents with     RECHECK     left shoulder   She continues to have significant problems especially when she reaches for things pain is mostly with range of motion.  She says that she is significantly limited with the range of motion.  We did discuss arthroscopic treatment versus total shoulder arthroplasty.  At this point she wants to continue an active lifestyle.  We discussed the differences between hemiarthroplasty resurfacing and total shoulder arthroplasty.  In  addition she would require biceps tenodesis.    Medications  atenolol-chlorthalidone (TENORETIC) 50-25 MG tablet, Take 1 tablet by mouth daily  estradiol (ESTRACE) 1 MG tablet, Take 1 tablet (1 mg) by mouth daily  fish oil-omega-3 fatty acids (FISH OIL) 1000 MG capsule, Take 2 g by mouth daily.  GLUCOSAMINE HCL-MSM PO, DAILY  guaiFENesin-codeine (ROBITUSSIN AC) 100-10 MG/5ML solution, Take 5-10 mLs by mouth every 4 hours as needed for cough (Patient not taking: Reported on 12/4/2019)  meloxicam (MOBIC) 7.5 MG tablet, Take 2 tablets (15 mg) by mouth daily  Multiple Vitamin (MULTIVITAMIN OR), Take 1 tablet by mouth daily.  Naproxen Sodium (ALEVE) 220 MG capsule, Take 220 mg by mouth daily.  potassium chloride ER (K-TAB) 20 MEQ CR tablet, Take 1 tablet (20 mEq) by mouth 3 times daily  [facility] PO,     No current facility-administered medications on file prior to visit.          Allergies   No Known Allergies     PFSH  Past Medical History:   Diagnosis Date     Arthritis     ostio     Cervix cancer (H) 1990     Depression     after 's death     History of left breast biopsy 2011    negative     HTN      UTI     history of        Past Surgical History:   Procedure Laterality Date     BACK SURGERY  2009     BIOPSY BREAST  2011    benign     C APPENDECTOMY       C ENLARGE BREAST  2006     C NONSPECIFIC PROCEDURE  2-04    ventral hernia repair     C NONSPECIFIC PROCEDURE  12/29/11    Lumbar fusion L5-S1     C NONSPECIFIC PROCEDURE      cervical fusion C3     C/SECTION, CLASSICAL      x2     COLONOSCOPY  2005     COLONOSCOPY N/A 2/12/2018    Procedure: COLONOSCOPY;  COLONOSCOPY;  Surgeon: Ken Fair MD;  Location:  GI     HEAD & NECK SURGERY  2011     HERNIA REPAIR  2003     HYSTERECTOMY  1990    one ovary removed, h/o cervical cancer       Family History   Problem Relation Age of Onset     Gastrointestinal Disease Mother         kidney failure     Arthritis Mother      Cancer Mother         leukemia      Hypertension Mother 50     Breast Cancer Mother      Hypertension Maternal Grandmother 50     Arthritis Maternal Grandfather      Cerebrovascular Disease Maternal Grandfather      Cerebrovascular Disease Paternal Grandfather      Cardiovascular Paternal Grandmother 90        hardening of the arteries     Cancer - colorectal Paternal Grandmother      Hypertension Brother 50     Hypertension Sister 50     Allergies Daughter      Allergies Daughter      Allergies Daughter      Macular Degeneration Paternal Aunt         dry     Macular Degeneration Paternal Aunt         wet     Asthma No family hx of      C.A.D. No family hx of      Diabetes No family hx of      Prostate Cancer No family hx of      Alcohol/Drug No family hx of      Alzheimer Disease No family hx of      Anesthesia Reaction No family hx of      Blood Disease No family hx of      Circulatory No family hx of      Congenital Anomalies No family hx of      Connective Tissue Disorder No family hx of      Depression No family hx of      Eye Disorder No family hx of      Genetic Disorder No family hx of      Genitourinary Problems No family hx of      Gynecology No family hx of      Heart Disease No family hx of      Lipids No family hx of      Musculoskeletal Disorder No family hx of      Neurologic Disorder No family hx of      Obesity No family hx of      Osteoporosis No family hx of      Psychotic Disorder No family hx of      Thyroid Disease No family hx of      Respiratory No family hx of      Glaucoma No family hx of        Social History     Tobacco Use     Smoking status: Former Smoker     Packs/day: 1.00     Years: 2.00     Pack years: 2.00     Types: Cigarettes     Last attempt to quit: 2010     Years since quittin.5     Smokeless tobacco: Never Used     Tobacco comment: no use of tobacco products   Substance Use Topics     Alcohol use: Yes     Comment: socially          ROS  Review of Systems   REVIEW OF SYSTEMS  General: negative for,  "night sweats, dizziness, fatigue  Resp: No shortness of breath and no cough  CV: negative for chest pain, syncope or near-syncope  GI: negative for nausea, vomiting and diarrhea  : negative for dysuria and hematuria  Musculoskeletal: as above  Neurologic: negative for syncope   Hematologic: negative for bleeding disorder    Physical / Results   Physical Exam         Physical Exam:  Vitals: BP (!) 150/88   Ht 1.56 m (5' 1.42\")   Wt 61.9 kg (136 lb 8 oz)   BMI 25.44 kg/m    BMI= Body mass index is 25.44 kg/m .  Constitutional: healthy, alert and no acute distress   Psychiatric: mentation appears normal and affect normal/bright  NEURO: no focal deficits  RESP: Normal with easy respirations and no use of accessory muscles to breathe, no audible wheezing or retractions  CV: Pulses are palpable  SKIN: Warm with no erythema  JOINT/EXTREMITIES:LeftRange of motion limited due to pain  normal strength to supraspinatus, infraspinatus, and subscapularis  proximal biceps tenderness Positive      These images were independently reviewed.  Nura Cole MD  "

## 2020-02-04 ENCOUNTER — OFFICE VISIT (OUTPATIENT)
Dept: FAMILY MEDICINE | Facility: OTHER | Age: 65
End: 2020-02-04
Payer: COMMERCIAL

## 2020-02-04 VITALS
WEIGHT: 137.5 LBS | HEART RATE: 60 BPM | BODY MASS INDEX: 25.96 KG/M2 | OXYGEN SATURATION: 97 % | TEMPERATURE: 98.3 F | HEIGHT: 61 IN | SYSTOLIC BLOOD PRESSURE: 126 MMHG | DIASTOLIC BLOOD PRESSURE: 74 MMHG

## 2020-02-04 DIAGNOSIS — Z01.818 PREOP GENERAL PHYSICAL EXAM: Primary | ICD-10-CM

## 2020-02-04 DIAGNOSIS — M19.012 PRIMARY OSTEOARTHRITIS OF LEFT SHOULDER: ICD-10-CM

## 2020-02-04 DIAGNOSIS — I10 HYPERTENSION GOAL BP (BLOOD PRESSURE) < 140/90: ICD-10-CM

## 2020-02-04 LAB
ALBUMIN UR-MCNC: NEGATIVE MG/DL
ANION GAP SERPL CALCULATED.3IONS-SCNC: 3 MMOL/L (ref 3–14)
APPEARANCE UR: CLEAR
BILIRUB UR QL STRIP: NEGATIVE
BUN SERPL-MCNC: 23 MG/DL (ref 7–30)
CALCIUM SERPL-MCNC: 9 MG/DL (ref 8.5–10.1)
CHLORIDE SERPL-SCNC: 103 MMOL/L (ref 94–109)
CO2 SERPL-SCNC: 31 MMOL/L (ref 20–32)
COLOR UR AUTO: YELLOW
CREAT SERPL-MCNC: 0.8 MG/DL (ref 0.52–1.04)
ERYTHROCYTE [DISTWIDTH] IN BLOOD BY AUTOMATED COUNT: 12.4 % (ref 10–15)
GFR SERPL CREATININE-BSD FRML MDRD: 77 ML/MIN/{1.73_M2}
GLUCOSE SERPL-MCNC: 83 MG/DL (ref 70–99)
GLUCOSE UR STRIP-MCNC: NEGATIVE MG/DL
HCT VFR BLD AUTO: 35.8 % (ref 35–47)
HGB BLD-MCNC: 12 G/DL (ref 11.7–15.7)
HGB UR QL STRIP: NEGATIVE
KETONES UR STRIP-MCNC: NEGATIVE MG/DL
LEUKOCYTE ESTERASE UR QL STRIP: NEGATIVE
MCH RBC QN AUTO: 32.4 PG (ref 26.5–33)
MCHC RBC AUTO-ENTMCNC: 33.5 G/DL (ref 31.5–36.5)
MCV RBC AUTO: 97 FL (ref 78–100)
NITRATE UR QL: NEGATIVE
PH UR STRIP: 6 PH (ref 5–7)
PLATELET # BLD AUTO: 261 10E9/L (ref 150–450)
POTASSIUM SERPL-SCNC: 3.7 MMOL/L (ref 3.4–5.3)
RBC # BLD AUTO: 3.7 10E12/L (ref 3.8–5.2)
SODIUM SERPL-SCNC: 137 MMOL/L (ref 133–144)
SOURCE: NORMAL
SP GR UR STRIP: 1.01 (ref 1–1.03)
UROBILINOGEN UR STRIP-ACNC: 0.2 EU/DL (ref 0.2–1)
WBC # BLD AUTO: 6.4 10E9/L (ref 4–11)

## 2020-02-04 PROCEDURE — 36415 COLL VENOUS BLD VENIPUNCTURE: CPT | Performed by: FAMILY MEDICINE

## 2020-02-04 PROCEDURE — 80048 BASIC METABOLIC PNL TOTAL CA: CPT | Performed by: FAMILY MEDICINE

## 2020-02-04 PROCEDURE — 93000 ELECTROCARDIOGRAM COMPLETE: CPT | Performed by: FAMILY MEDICINE

## 2020-02-04 PROCEDURE — 81003 URINALYSIS AUTO W/O SCOPE: CPT | Performed by: FAMILY MEDICINE

## 2020-02-04 PROCEDURE — 99214 OFFICE O/P EST MOD 30 MIN: CPT | Performed by: FAMILY MEDICINE

## 2020-02-04 PROCEDURE — 85027 COMPLETE CBC AUTOMATED: CPT | Performed by: FAMILY MEDICINE

## 2020-02-04 ASSESSMENT — MIFFLIN-ST. JEOR: SCORE: 1104.57

## 2020-02-05 ENCOUNTER — HOSPITAL ENCOUNTER (OUTPATIENT)
Dept: CT IMAGING | Facility: CLINIC | Age: 65
Discharge: HOME | End: 2020-02-05
Attending: PHYSICIAN ASSISTANT | Admitting: PHYSICIAN ASSISTANT
Payer: COMMERCIAL

## 2020-02-05 DIAGNOSIS — M19.012 PRIMARY OSTEOARTHRITIS OF LEFT SHOULDER: ICD-10-CM

## 2020-02-05 PROCEDURE — 73200 CT UPPER EXTREMITY W/O DYE: CPT | Mod: LT

## 2020-02-11 ENCOUNTER — PREP FOR PROCEDURE (OUTPATIENT)
Dept: ORTHOPEDICS | Facility: CLINIC | Age: 65
End: 2020-02-11

## 2020-02-12 ENCOUNTER — ANESTHESIA (OUTPATIENT)
Dept: SURGERY | Facility: CLINIC | Age: 65
End: 2020-02-12
Payer: COMMERCIAL

## 2020-02-12 ENCOUNTER — ANESTHESIA EVENT (OUTPATIENT)
Dept: SURGERY | Facility: CLINIC | Age: 65
End: 2020-02-12
Payer: COMMERCIAL

## 2020-02-12 ENCOUNTER — HOSPITAL ENCOUNTER (INPATIENT)
Dept: GENERAL RADIOLOGY | Facility: CLINIC | Age: 65
Setting detail: SURGERY ADMIT
End: 2020-02-12
Attending: PHYSICIAN ASSISTANT | Admitting: ORTHOPAEDIC SURGERY
Payer: COMMERCIAL

## 2020-02-12 ENCOUNTER — ANCILLARY PROCEDURE (OUTPATIENT)
Dept: ULTRASOUND IMAGING | Facility: CLINIC | Age: 65
End: 2020-02-12
Attending: NURSE ANESTHETIST, CERTIFIED REGISTERED
Payer: COMMERCIAL

## 2020-02-12 ENCOUNTER — HOSPITAL ENCOUNTER (OUTPATIENT)
Facility: CLINIC | Age: 65
Discharge: HOME | End: 2020-02-12
Attending: ORTHOPAEDIC SURGERY | Admitting: ORTHOPAEDIC SURGERY
Payer: COMMERCIAL

## 2020-02-12 VITALS
SYSTOLIC BLOOD PRESSURE: 167 MMHG | OXYGEN SATURATION: 98 % | RESPIRATION RATE: 16 BRPM | DIASTOLIC BLOOD PRESSURE: 79 MMHG | TEMPERATURE: 97.9 F | HEART RATE: 67 BPM

## 2020-02-12 DIAGNOSIS — Z96.612 S/P SHOULDER HEMIARTHROPLASTY, LEFT: Primary | ICD-10-CM

## 2020-02-12 DIAGNOSIS — M19.012 PRIMARY OSTEOARTHRITIS OF LEFT SHOULDER: ICD-10-CM

## 2020-02-12 DIAGNOSIS — Z96.612 S/P SHOULDER HEMIARTHROPLASTY, LEFT: ICD-10-CM

## 2020-02-12 PROCEDURE — 40000985 XR SHOULDER 2 VIEW LEFT: Mod: LT

## 2020-02-12 PROCEDURE — 25800030 ZZH RX IP 258 OP 636: Performed by: NURSE ANESTHETIST, CERTIFIED REGISTERED

## 2020-02-12 PROCEDURE — 37000008 ZZH ANESTHESIA TECHNICAL FEE, 1ST 30 MIN: Performed by: ORTHOPAEDIC SURGERY

## 2020-02-12 PROCEDURE — 25000128 H RX IP 250 OP 636: Performed by: ORTHOPAEDIC SURGERY

## 2020-02-12 PROCEDURE — 25000125 ZZHC RX 250: Performed by: NURSE ANESTHETIST, CERTIFIED REGISTERED

## 2020-02-12 PROCEDURE — 40000306 ZZH STATISTIC PRE PROC ASSESS II: Performed by: ORTHOPAEDIC SURGERY

## 2020-02-12 PROCEDURE — 27211024 ZZHC OR SUPPLY OTHER OPNP: Performed by: ORTHOPAEDIC SURGERY

## 2020-02-12 PROCEDURE — 23470 RECONSTRUCT SHOULDER JOINT: CPT | Mod: AS | Performed by: PHYSICIAN ASSISTANT

## 2020-02-12 PROCEDURE — 23470 RECONSTRUCT SHOULDER JOINT: CPT | Mod: LT | Performed by: ORTHOPAEDIC SURGERY

## 2020-02-12 PROCEDURE — 71000027 ZZH RECOVERY PHASE 2 EACH 15 MINS: Performed by: ORTHOPAEDIC SURGERY

## 2020-02-12 PROCEDURE — 27110028 ZZH OR GENERAL SUPPLY NON-STERILE: Performed by: ORTHOPAEDIC SURGERY

## 2020-02-12 PROCEDURE — 37000009 ZZH ANESTHESIA TECHNICAL FEE, EACH ADDTL 15 MIN: Performed by: ORTHOPAEDIC SURGERY

## 2020-02-12 PROCEDURE — 36000093 ZZH SURGERY LEVEL 4 1ST 30 MIN: Performed by: ORTHOPAEDIC SURGERY

## 2020-02-12 PROCEDURE — 25000566 ZZH SEVOFLURANE, EA 15 MIN: Performed by: ORTHOPAEDIC SURGERY

## 2020-02-12 PROCEDURE — 71000014 ZZH RECOVERY PHASE 1 LEVEL 2 FIRST HR: Performed by: ORTHOPAEDIC SURGERY

## 2020-02-12 PROCEDURE — 71000015 ZZH RECOVERY PHASE 1 LEVEL 2 EA ADDTL HR: Performed by: ORTHOPAEDIC SURGERY

## 2020-02-12 PROCEDURE — 25000128 H RX IP 250 OP 636: Performed by: NURSE ANESTHETIST, CERTIFIED REGISTERED

## 2020-02-12 PROCEDURE — 27210794 ZZH OR GENERAL SUPPLY STERILE: Performed by: ORTHOPAEDIC SURGERY

## 2020-02-12 PROCEDURE — C1713 ANCHOR/SCREW BN/BN,TIS/BN: HCPCS | Performed by: ORTHOPAEDIC SURGERY

## 2020-02-12 PROCEDURE — C1776 JOINT DEVICE (IMPLANTABLE): HCPCS | Performed by: ORTHOPAEDIC SURGERY

## 2020-02-12 PROCEDURE — 36000063 ZZH SURGERY LEVEL 4 EA 15 ADDTL MIN: Performed by: ORTHOPAEDIC SURGERY

## 2020-02-12 RX ORDER — ACETAMINOPHEN 325 MG/1
650 TABLET ORAL
Status: CANCELLED | OUTPATIENT
Start: 2020-02-12

## 2020-02-12 RX ORDER — PROPOFOL 10 MG/ML
INJECTION, EMULSION INTRAVENOUS CONTINUOUS PRN
Status: DISCONTINUED | OUTPATIENT
Start: 2020-02-12 | End: 2020-02-12

## 2020-02-12 RX ORDER — HYDROMORPHONE HYDROCHLORIDE 1 MG/ML
.3-.5 INJECTION, SOLUTION INTRAMUSCULAR; INTRAVENOUS; SUBCUTANEOUS EVERY 10 MIN PRN
Status: DISCONTINUED | OUTPATIENT
Start: 2020-02-12 | End: 2020-02-12 | Stop reason: HOSPADM

## 2020-02-12 RX ORDER — PROPOFOL 10 MG/ML
INJECTION, EMULSION INTRAVENOUS PRN
Status: DISCONTINUED | OUTPATIENT
Start: 2020-02-12 | End: 2020-02-12

## 2020-02-12 RX ORDER — DIMENHYDRINATE 50 MG/ML
25 INJECTION, SOLUTION INTRAMUSCULAR; INTRAVENOUS
Status: DISCONTINUED | OUTPATIENT
Start: 2020-02-12 | End: 2020-02-12 | Stop reason: HOSPADM

## 2020-02-12 RX ORDER — CEFAZOLIN SODIUM 1 G/3ML
1 INJECTION, POWDER, FOR SOLUTION INTRAMUSCULAR; INTRAVENOUS SEE ADMIN INSTRUCTIONS
Status: DISCONTINUED | OUTPATIENT
Start: 2020-02-12 | End: 2020-02-12 | Stop reason: HOSPADM

## 2020-02-12 RX ORDER — CEFAZOLIN SODIUM 2 G/100ML
2 INJECTION, SOLUTION INTRAVENOUS
Status: DISCONTINUED | OUTPATIENT
Start: 2020-02-12 | End: 2020-02-12 | Stop reason: HOSPADM

## 2020-02-12 RX ORDER — ROPIVACAINE HYDROCHLORIDE 2 MG/ML
INJECTION, SOLUTION EPIDURAL; INFILTRATION; PERINEURAL PRN
Status: DISCONTINUED | OUTPATIENT
Start: 2020-02-12 | End: 2020-02-12 | Stop reason: HOSPADM

## 2020-02-12 RX ORDER — FENTANYL CITRATE 50 UG/ML
INJECTION, SOLUTION INTRAMUSCULAR; INTRAVENOUS PRN
Status: DISCONTINUED | OUTPATIENT
Start: 2020-02-12 | End: 2020-02-12

## 2020-02-12 RX ORDER — OXYCODONE HYDROCHLORIDE 5 MG/1
5 TABLET ORAL EVERY 4 HOURS PRN
Status: DISCONTINUED | OUTPATIENT
Start: 2020-02-12 | End: 2020-02-12 | Stop reason: HOSPADM

## 2020-02-12 RX ORDER — FENTANYL CITRATE 50 UG/ML
25-50 INJECTION, SOLUTION INTRAMUSCULAR; INTRAVENOUS
Status: DISCONTINUED | OUTPATIENT
Start: 2020-02-12 | End: 2020-02-12 | Stop reason: HOSPADM

## 2020-02-12 RX ORDER — CLINDAMYCIN PHOSPHATE 900 MG/50ML
900 INJECTION, SOLUTION INTRAVENOUS EVERY 8 HOURS
Status: CANCELLED | OUTPATIENT
Start: 2020-02-12 | End: 2020-02-13

## 2020-02-12 RX ORDER — NALOXONE HYDROCHLORIDE 0.4 MG/ML
.1-.4 INJECTION, SOLUTION INTRAMUSCULAR; INTRAVENOUS; SUBCUTANEOUS
Status: DISCONTINUED | OUTPATIENT
Start: 2020-02-12 | End: 2020-02-12 | Stop reason: HOSPADM

## 2020-02-12 RX ORDER — LIDOCAINE 40 MG/G
CREAM TOPICAL
Status: DISCONTINUED | OUTPATIENT
Start: 2020-02-12 | End: 2020-02-12 | Stop reason: HOSPADM

## 2020-02-12 RX ORDER — BUPIVACAINE HYDROCHLORIDE AND EPINEPHRINE 5; 5 MG/ML; UG/ML
INJECTION, SOLUTION PERINEURAL PRN
Status: DISCONTINUED | OUTPATIENT
Start: 2020-02-12 | End: 2020-02-12

## 2020-02-12 RX ORDER — HYDROXYZINE HYDROCHLORIDE 25 MG/1
25 TABLET, FILM COATED ORAL 3 TIMES DAILY PRN
Qty: 60 TABLET | Refills: 0 | Status: SHIPPED | OUTPATIENT
Start: 2020-02-12 | End: 2020-08-28

## 2020-02-12 RX ORDER — DEXAMETHASONE SODIUM PHOSPHATE 10 MG/ML
INJECTION, SOLUTION INTRAMUSCULAR; INTRAVENOUS PRN
Status: DISCONTINUED | OUTPATIENT
Start: 2020-02-12 | End: 2020-02-12

## 2020-02-12 RX ORDER — ONDANSETRON 4 MG/1
4 TABLET, ORALLY DISINTEGRATING ORAL EVERY 30 MIN PRN
Status: DISCONTINUED | OUTPATIENT
Start: 2020-02-12 | End: 2020-02-12 | Stop reason: HOSPADM

## 2020-02-12 RX ORDER — SODIUM CHLORIDE, SODIUM LACTATE, POTASSIUM CHLORIDE, CALCIUM CHLORIDE 600; 310; 30; 20 MG/100ML; MG/100ML; MG/100ML; MG/100ML
INJECTION, SOLUTION INTRAVENOUS CONTINUOUS
Status: DISCONTINUED | OUTPATIENT
Start: 2020-02-12 | End: 2020-02-12 | Stop reason: HOSPADM

## 2020-02-12 RX ORDER — LIDOCAINE HYDROCHLORIDE 20 MG/ML
INJECTION, SOLUTION INFILTRATION; PERINEURAL PRN
Status: DISCONTINUED | OUTPATIENT
Start: 2020-02-12 | End: 2020-02-12

## 2020-02-12 RX ORDER — ONDANSETRON 2 MG/ML
INJECTION INTRAMUSCULAR; INTRAVENOUS PRN
Status: DISCONTINUED | OUTPATIENT
Start: 2020-02-12 | End: 2020-02-12

## 2020-02-12 RX ORDER — CLINDAMYCIN PHOSPHATE 600 MG/50ML
INJECTION, SOLUTION INTRAVENOUS PRN
Status: DISCONTINUED | OUTPATIENT
Start: 2020-02-12 | End: 2020-02-12

## 2020-02-12 RX ORDER — ONDANSETRON 2 MG/ML
4 INJECTION INTRAMUSCULAR; INTRAVENOUS EVERY 30 MIN PRN
Status: DISCONTINUED | OUTPATIENT
Start: 2020-02-12 | End: 2020-02-12 | Stop reason: HOSPADM

## 2020-02-12 RX ORDER — OXYCODONE HYDROCHLORIDE 5 MG/1
5 TABLET ORAL
Status: CANCELLED | OUTPATIENT
Start: 2020-02-12

## 2020-02-12 RX ORDER — LIDOCAINE 40 MG/G
CREAM TOPICAL
Status: CANCELLED | OUTPATIENT
Start: 2020-02-12

## 2020-02-12 RX ORDER — OXYCODONE HYDROCHLORIDE 5 MG/1
5-10 TABLET ORAL EVERY 4 HOURS PRN
Qty: 50 TABLET | Refills: 0 | Status: SHIPPED | OUTPATIENT
Start: 2020-02-12 | End: 2020-08-28

## 2020-02-12 RX ORDER — AMOXICILLIN 250 MG
1-2 CAPSULE ORAL 2 TIMES DAILY
Qty: 30 TABLET | Refills: 0 | Status: SHIPPED | OUTPATIENT
Start: 2020-02-12 | End: 2020-08-28

## 2020-02-12 RX ORDER — VANCOMYCIN HCL 900 MCG/MG
POWDER (GRAM) MISCELLANEOUS PRN
Status: DISCONTINUED | OUTPATIENT
Start: 2020-02-12 | End: 2020-02-12 | Stop reason: HOSPADM

## 2020-02-12 RX ADMIN — DEXAMETHASONE SODIUM PHOSPHATE 5 MG: 10 INJECTION, SOLUTION INTRAMUSCULAR; INTRAVENOUS at 09:43

## 2020-02-12 RX ADMIN — HYDROMORPHONE HYDROCHLORIDE 0.5 MG: 1 INJECTION, SOLUTION INTRAMUSCULAR; INTRAVENOUS; SUBCUTANEOUS at 08:10

## 2020-02-12 RX ADMIN — ROCURONIUM BROMIDE 50 MG: 10 INJECTION INTRAVENOUS at 07:37

## 2020-02-12 RX ADMIN — CLINDAMYCIN IN 5 PERCENT DEXTROSE 600 MG: 12 INJECTION, SOLUTION INTRAVENOUS at 07:50

## 2020-02-12 RX ADMIN — PROPOFOL 200 MG: 10 INJECTION, EMULSION INTRAVENOUS at 07:37

## 2020-02-12 RX ADMIN — PHENYLEPHRINE HYDROCHLORIDE 100 MCG: 10 INJECTION INTRAVENOUS at 08:35

## 2020-02-12 RX ADMIN — FENTANYL CITRATE 50 MCG: 50 INJECTION, SOLUTION INTRAMUSCULAR; INTRAVENOUS at 07:44

## 2020-02-12 RX ADMIN — PROPOFOL 50 MCG/KG/MIN: 10 INJECTION, EMULSION INTRAVENOUS at 08:04

## 2020-02-12 RX ADMIN — SODIUM CHLORIDE, POTASSIUM CHLORIDE, SODIUM LACTATE AND CALCIUM CHLORIDE: 600; 310; 30; 20 INJECTION, SOLUTION INTRAVENOUS at 09:20

## 2020-02-12 RX ADMIN — PHENYLEPHRINE HYDROCHLORIDE 100 MCG: 10 INJECTION INTRAVENOUS at 08:22

## 2020-02-12 RX ADMIN — SODIUM CHLORIDE, POTASSIUM CHLORIDE, SODIUM LACTATE AND CALCIUM CHLORIDE: 600; 310; 30; 20 INJECTION, SOLUTION INTRAVENOUS at 07:04

## 2020-02-12 RX ADMIN — LIDOCAINE HYDROCHLORIDE 80 MG: 20 INJECTION, SOLUTION INFILTRATION; PERINEURAL at 07:37

## 2020-02-12 RX ADMIN — PHENYLEPHRINE HYDROCHLORIDE 100 MCG: 10 INJECTION INTRAVENOUS at 09:02

## 2020-02-12 RX ADMIN — ROCURONIUM BROMIDE 20 MG: 10 INJECTION INTRAVENOUS at 08:25

## 2020-02-12 RX ADMIN — FENTANYL CITRATE 50 MCG: 50 INJECTION INTRAMUSCULAR; INTRAVENOUS at 09:45

## 2020-02-12 RX ADMIN — Medication 10 ML: at 09:44

## 2020-02-12 RX ADMIN — PHENYLEPHRINE HYDROCHLORIDE 100 MCG: 10 INJECTION INTRAVENOUS at 08:49

## 2020-02-12 RX ADMIN — DEXAMETHASONE SODIUM PHOSPHATE 10 MG: 10 INJECTION, SOLUTION INTRAMUSCULAR; INTRAVENOUS at 08:04

## 2020-02-12 RX ADMIN — FENTANYL CITRATE 50 MCG: 50 INJECTION, SOLUTION INTRAMUSCULAR; INTRAVENOUS at 07:37

## 2020-02-12 RX ADMIN — MIDAZOLAM 2 MG: 1 INJECTION INTRAMUSCULAR; INTRAVENOUS at 07:29

## 2020-02-12 RX ADMIN — Medication 10 ML: at 09:43

## 2020-02-12 RX ADMIN — DEXAMETHASONE SODIUM PHOSPHATE 5 MG: 10 INJECTION, SOLUTION INTRAMUSCULAR; INTRAVENOUS at 09:44

## 2020-02-12 RX ADMIN — SUGAMMADEX 200 MG: 100 INJECTION, SOLUTION INTRAVENOUS at 09:26

## 2020-02-12 RX ADMIN — LIDOCAINE HYDROCHLORIDE 5 ML: 10 INJECTION, SOLUTION EPIDURAL; INFILTRATION; INTRACAUDAL; PERINEURAL at 07:05

## 2020-02-12 RX ADMIN — ONDANSETRON 4 MG: 2 INJECTION INTRAMUSCULAR; INTRAVENOUS at 08:34

## 2020-02-12 ASSESSMENT — LIFESTYLE VARIABLES: TOBACCO_USE: 1

## 2020-02-12 NOTE — ANESTHESIA PREPROCEDURE EVALUATION
Anesthesia Pre-Procedure Evaluation    Patient: Chritsiana Lopes   MRN: [id number] : 1955          Preoperative Diagnosis: Primary osteoarthritis of left shoulder [M19.012]    Procedure(s):  left shoulder arthroscopy  Left shoulder hemiarthroplasy vs total shoulder    Past Medical History:   Diagnosis Date     Arthritis     ostio     Cervix cancer (H)      Depression     after 's death     History of left breast biopsy     negative     HTN      UTI     history of      Past Surgical History:   Procedure Laterality Date     BACK SURGERY  2009     BIOPSY BREAST  2011    benign     C APPENDECTOMY       C ENLARGE BREAST       C NONSPECIFIC PROCEDURE  2-04    ventral hernia repair     C NONSPECIFIC PROCEDURE  11    Lumbar fusion L5-S1     C NONSPECIFIC PROCEDURE      cervical fusion C3     C/SECTION, CLASSICAL      x2     COLONOSCOPY       COLONOSCOPY N/A 2018    Procedure: COLONOSCOPY;  COLONOSCOPY;  Surgeon: Ken Fair MD;  Location:  GI     HEAD & NECK SURGERY       HERNIA REPAIR       HYSTERECTOMY      one ovary removed, h/o cervical cancer       Anesthesia Evaluation     . Pt has had prior anesthetic. Type: General and MAC    No history of anesthetic complications          ROS/MED HX    ENT/Pulmonary:     (+)tobacco use, Past use 1ppd for 2 years packs/day  , . .    Neurologic:  - neg neurologic ROS     Cardiovascular:     (+) hypertension----. : . . . :. . Previous cardiac testing date:results:Stress Testdate:3/13/19 results:   Stress  Exercise was stopped due to dyspnea.  There was a normal BP response to exercise.  Target Heart Rate was achieved.  There was a maximum 1.5mm ST segment depression in the inferior lead(s).  There was a maximum 1.5mm ST segment depression in the lateral lead(s).  This was a normal stress echocardiogram with no evidence of stress-induced  ischemia.  The visual ejection fraction is estimated at >70%.  Global LV systolic  function augments with exercise.  Left ventricular cavity size decreases with exercise.  Normal resting wall motion and no stress-induced wall motion abnormality.     Baseline  The patient is in normal sinus rhythm.  Resting ECG is normal.  The visual ejection fraction is estimated at 55-60%.  No regional wall motion abnormalities noted.     Stress Results         Protocol:  [place] HOA        Maximum Predicted HR:   155 bpm            Target HR: 132 bpm         % Maximum Predicted HR: 99 %              Stage  DurationHeart Rate  BP              Comment                  (mm:ss)   (bpm)          Stage 1   3:00      94    162/80          Stage 2   3:00     115    172/80          Stage 3   3:00     144    178/80          Stage 4   1:32     153      /   RPP 47430, FAC Above, RPP [id number]         RECOVERYR  6:00      82    150/62               Stress Duration:   10:32 mm:ss *        Recovery Time: 6:00 mm:ss         Maximum Stress HR: 153 bpm              METS:          12     Left Ventricle  The left ventricle is normal in size.        Mitral Valve  The mitral valve leaflets appear normal. There is no evidence of stenosis,  fluttering, or prolapse. There is no mitral regurgitation noted. There is no  mitral valve stenosis.     Tricuspid Valve  Normal tricuspid valve. The right ventricular systolic pressure is  approximated at 25.8 mmHg plus the right atrial pressure. Right ventricle  systolic pressure estimate normal. There is no tricuspid stenosis.     Aortic Valve  The aortic valve is trileaflet. There is mild (1+) aortic regurgitation. No  aortic stenosis is present.ECG reviewed date:2/4/20 results:Normal Sinus Rhythm, normal axis, normal intervals, no acute ST/T changes c/w ischemia, no LVH by voltage criteria    date: results:          METS/Exercise Tolerance:  >4 METS   Hematologic:  - neg hematologic  ROS       Musculoskeletal:   (+) arthritis,  -       GI/Hepatic:  - neg GI/hepatic ROS       Renal/Genitourinary:  -  "ROS Renal section negative       Endo:  - neg endo ROS       Psychiatric:     (+) psychiatric history depression      Infectious Disease:  - neg infectious disease ROS       Malignancy:      - no malignancy   Other:    - neg other ROS                      Physical Exam  Normal systems: cardiovascular, pulmonary and dental    Airway   Mallampati: II  TM distance: >3 FB  Neck ROM: full    Dental     Cardiovascular   Rhythm and rate: regular and normal      Pulmonary    breath sounds clear to auscultation            Lab Results   Component Value Date    WBC 6.4 02/04/2020    HGB 12.0 02/04/2020    HCT 35.8 02/04/2020     02/04/2020    CRP <5.0 07/08/2014    SED 9 07/08/2014     02/04/2020    POTASSIUM 3.7 02/04/2020    CHLORIDE 103 02/04/2020    CO2 31 02/04/2020    BUN 23 02/04/2020    CR 0.80 02/04/2020    GLC 83 02/04/2020    JERRI 9.0 02/04/2020    PHOS 3.4 10/27/2009    ALBUMIN 4.8 10/27/2009       Preop Vitals  BP Readings from Last 3 Encounters:   02/12/20 (!) 146/80   02/04/20 126/74   01/29/20 (!) 150/88    Pulse Readings from Last 3 Encounters:   02/04/20 60   08/23/19 60   03/01/19 53      Resp Readings from Last 3 Encounters:   02/12/20 16   08/23/19 14   03/01/19 14    SpO2 Readings from Last 3 Encounters:   02/12/20 97%   02/04/20 97%   08/23/19 99%      Temp Readings from Last 1 Encounters:   02/12/20 97.9  F (36.6  C) (Oral)    Ht Readings from Last 1 Encounters:   02/04/20 1.555 m (5' 1.22\")      Wt Readings from Last 1 Encounters:   02/04/20 62.4 kg (137 lb 8 oz)    Estimated body mass index is 25.79 kg/m  as calculated from the following:    Height as of 2/4/20: 1.555 m (5' 1.22\").    Weight as of 2/4/20: 62.4 kg (137 lb 8 oz).       Anesthesia Plan      History & Physical Review  History and physical reviewed and following examination; no interval change.    ASA Status:  2 .    NPO Status:  > 8 hours    Plan for General, ETT, For Post-op pain in coordination with surgeon and Peripheral " Nerve Block with Intravenous and Propofol induction. Maintenance will be Inhalation and Balanced.    PONV prophylaxis:  Ondansetron (or other 5HT-3) and Dexamethasone or Solumedrol       Postoperative Care  Postoperative pain management:  IV analgesics, Multi-modal analgesia, Peripheral nerve block (Single Shot) and Oral pain medications.      Consents  Anesthetic plan, risks, benefits and alternatives discussed with:  Patient.  Use of blood products discussed: No .   .                 LOC Vazquez CRNA

## 2020-02-12 NOTE — DISCHARGE INSTRUCTIONS
Total Shoulder Discharge Instructions    [phone]  Bone and Joint Service Line for issues or concerns    Home prescriptions:  Oxycodone 5 mg tablets:  Take one or two tablets every 4 hrs as needed for pain.  Atarax/hydroxyzine:  Take one tablet every 4-6 hours as needed to augment pain control or for muscle relaxant.  Stool Softener:    You may use any stool softener you are familiar with. We have suggested over the counter Sennakot as a fall back.  Tylenol :  You may take one or two tablets (325mg) every six hours as needed.    General Care:  After surgery you may feel tired/sleepy. This is normal. Please have someone stay with you for 24 hours after surgery. You should avoid driving for 1-2 days after surgery, as your reaction time may be slow. You should not drive at all if you have had surgery on your arms, right leg and/or are taking narcotic pain medications until released by your doctor. If you have any question along the way please contact the office. If you feel it is an issue cannot wait for normal office hours, contact the on-call physician.    Bandages:  You should keep negative pressure bandage in place until battery dies (typically 8 days or so).  You may shower directly over this.    Do not apply any ointments.     Bathing:  Do not submerge your incision in water such as a bath or pool. It is ok to shower. . Avoid any excessively hot showers, baths, or hot tubes after surgery.     Follow up:  Your follow up appointment should already be scheduled. If its not, please call the office to verify an appointment 10-14 days after surgery.     Diet:  Start with non-alcoholic liquids at first, particularly water or sports drinks after surgery. Progress to bland foods such as crackers and bread and finally to your normal diet if you have no problems.     Pain control:    The shoulder block should give you good pain relief for the next 16- 20 hours.  You will also not have good control of your arm.  Do not  remove your arm from the sling while the block is in effect.     It is important to start taking your pain medications before the block wears off.  If you do not and the block does wear off you may have difficulty getting the pain under control.    We usually recommend you begin the medication before you go to sleep and take them as scheduled through the first night even if it means waking up to do so.    Take your pain medications as prescribed. These medications may make you sleepy. Do not drive, operate equipment, or drink alcohol when taking these.  You may take Tylenol (Generic name is acetaminophen) as directed on the bottle for additional relief or in place of the prescribed pain medications as your pain gets better. If the medications cause a reaction such as nausea or skin rash, stop taking them and contact your doctor. Please plan accordingly, pain medications will not be re-filled on the weekends or at night. Call the office during the day if you need more medications.      Narcotic pain medication can cause constipation.  We recommend using an over the counter stool softener such as Senna while using these medications.    Sleeping Position:  Sometimes this can be a challenge after shoulder surgery. Some find it comfortable sleeping propped up on several pillows with pillows also behind their elbow. Others, if available, sleep in an easy chair. You may also lay on your back, it may be more comfortable to have a pillow behind your elbow to keep it from falling back.     Sling/Brace:  Keep the sling or brace on after surgery until your follow up. You may remove to bathe .  You should also slip your arm out of your sling several times a day and allow your elbow to straighten all the way out and then bend all the way up. You may need to use your other arm to assist in this. Also make sure to move your wrist back and forth and practice making a fist. Do this 10-15 times about 3-4 times a day.      Physical  Therapy:  Depending on your surgery, physical therapy may start within a few days or be delayed 4-6 weeks. At your first post-operative visit, your doctor will direct you on your personal therapy program.     Normal findings after surgery:  It is sometimes normal to have pain in the back of the shoulder even before the block wears off in the hand or elbow.  Warmth and swelling about the hand and shoulder is normal for up to 3-4 weeks after surgery.  Numbness around the incisions is normal.  You may have bruising around the incisions and into the bicep area. You may notice this bruising settle into the armpit, elbow and forearm.   Low grade fevers less than 100.5 degrees Fahrenheit are normal.     When to call the Office: [phone]  Temperature greater than 101.5 degrees Fahreheit.  Fever, chills, and increasing pain in the shoulder.  Excessive drainage from the incisions that include bright red blood.  Drainage from the incisions sites that appear yellow, pus-like, or foul smelling.  Increasing pain the shoulder not relieved by the prescribed pain medications or ice.  Persistent nausea or vomiting.  Any other effects you feel are significant.      *WHEN NOT EXERCISING KEEP ARM IN SLING!    Interscalene Nerve Block   Regional anesthesia is injected into or around the nerves to anesthetize the area supplied by that set of nerves.  It is a type of local anesthesia used to numb a specific area, and is used to control pain and decrease the need for narcotic following surgery.  Interscalene Block: A block injected near the neck/upper shoulder for post-operative pain relief after upper arm or shoulder surgery.  Benefits: Significant to total pain relief following extensive surgeries involving the shoulder and upper arm.  Additional benefits include: Decreased pain medication requirements, reduced incidence of nausea and vomiting, and potentially early discharge home.  Normal Course: A numb and often immobile shoulder  and upper arm is expected for approximately 8-12 hours.  The duration of the numbness can vary and is dependent on the type of local anesthetic used, additive and individual variation.    Once the numbness starts to wear off: the discomfort from surgery will intensify progressively over the next 1-2 hours.  We recommend starting oral narcotics (e.g. Vicodin or Percocet) and anti-inflammatory medications (e.g. ibuprofen or Motrin) as soon as oral medications are tolerated.  These medications should be taken on a scheduled basis, allowing for a smooth transition from the nerve block to oral medication based relief.  Normal and expected side effects: a droopy eyelid and blurry vision on the affected side, along with voice hoarseness can last as long as the local anesthetic effect.  Local anesthetic effect varies, but is typically between 8 and 24 hours.  Mild sensation of shortness of breath may be noted particularly in patients with respiratory disease  Risks: failed block, bleeding, infection, reaction to local anesthetic including seizure and cardiac arrest, spinal block, epidural donato, collapsed lung, peripheral nerve injury or persistent tingling sensation are all potential risks.  Please discuss any concerns regarding these risks with your anesthesia care provider.  These are most likely to occur at the time of administration of the block.   Additional Recommendations: Please keep the operative arm and elbow well protected and padded for the duration of numbness.  This will prevent unrecognized pressure from being placed on the arm that could result in nerve injury.  Post-operative call: To ensure your safety, you will receive a nursing call 24 hours after surgery.  If you have additional concerns or if you feel that the medications are not wearing off, please inform the nurse or your anesthesia care provider.  Please discuss all concerns regarding this procedure and your anesthetic care with your anesthesia care  provider.  The above information is not intended as a substitute for a complete discussion with your anesthesia care provider.  It is intended for your education and to enhance your ability to ask informed questions.    [facility] Same-Day Surgery   Adult Discharge Orders & Instructions - After Anesthesia    For 24 hours after surgery    1. Get plenty of rest.  A responsible adult must stay with you for at least 24 hours after you leave the hospital.   2. Do not drive or use heavy equipment.  If you have weakness or tingling, don't drive or use heavy equipment until this feeling goes away.  3. Do not drink alcohol.  4. Avoid strenuous or risky activities.  Ask for help when climbing stairs.   5. You may feel lightheaded.  If so, sit for a few minutes before standing.  Have someone help you get up.   6. You may have a slight fever. Call the doctor if your fever is over 100 F (37.7 C) (taken under the tongue) or lasts longer than 24 hours.  7. You may have a dry mouth, a sore throat, muscle aches or trouble sleeping.  These should go away after 24 hours.  8. Do not make important or legal decisions.  We don t expect you to have any problems from the surgery or treatment you had today. Just in case, here s what to do if you have pain, upset stomach (nausea), bleeding or infection:  Pain:  Take medicines your doctor has prescribed or over-the-counter medicine they have suggested. Resting and using ice packs can help, too. For surgery on an arm or leg, raise it on a pillow to ease swelling. Call your doctor if these methods don t work.  Copyright [employer], Licensed under [employer]  Upset stomach (nausea):  Take anti-nausea medicine approved by your doctor. Drink clear liquids like apple juice, ginger ale, broth or 7-Up. Be sure to drink enough fluids. Rest can help, too. Move to normal foods when you re ready. Bleeding:  In the first 24 hours, you may see a little blood on your dressing, about  the size of a quarter. You don t need to worry about this much blood, but if the blood spot keeps getting bigger:    Put pressure on the wound if you can, AND    Call your doctor.  Copyright [employer], Licensed under [employer]  Fever/Infection: Please call your doctor if you have any of these signs:    Redness    Swelling    Wound feels warm    Pain gets worse    Bad-smelling fluid leaks from wound    Fever or chills  Call your doctor for any of the followin.  It has been over 8 to 10 hours since surgery and you are still not able to urinate (pass water).    2.  Headache for over 24 hours.    Nurse advice line: [phone]

## 2020-02-12 NOTE — ANESTHESIA PROCEDURE NOTES
Peripheral nerve/Neuraxial procedure note : interscalene  Pre-Procedure  Performed by  Misha Bingham APRN CRNA   Location: post-op    Procedure Times:2/12/2020 9:35 AM and 2/12/2020 9:46 AM  Pre-Anesthestic Checklist: patient identified, IV checked, site marked, risks and benefits discussed, informed consent, monitors and equipment checked, pre-op evaluation, at physician/surgeon's request and post-op pain management    Timeout  Correct Patient: Yes   Correct Procedure: Yes   Correct Site: Yes   Correct Laterality: Yes   Correct Position: Yes   Site Marked: Yes   .   Procedure Documentation    .    Procedure: interscalene, left.   Local skin infiltrated with 1 mL of 1% lidocaine.    Ultrasound used to identify targeted nerve, plexus, or vascular marker and placed a needle adjacent to it., Ultrasound was used to visualize the spread of the anesthetic in close proximity to the above stated nerve. A permanent image is entered into the patient's record.  Patient Prep/Sterile Barriers; mask, sterile gloves, chlorhexidine gluconate and isopropyl alcohol.  Nerve Stim: Initial Level 0.45 mA.  Lowest motor response 0.3 mA..  Needle: insulated   Needle Gauge: 22.  Insertion Method: Single Shot.        Assessment/Narrative  Paresthesias: No.  Injection made incrementally with aspirations every 5 mL..  The placement was negative for: blood aspirated, painful injection and site bleeding.  Bolus given via..   Secured via.   Complications: none. Test dose of mL at. Test dose negative for signs of intravascular, subdural or intrathecal injection. Comments:  Pt tolerated the procedure well.  Pain decreased from 6 /10 to 0 /10.  No complications noted.  Will follow if needed.

## 2020-02-12 NOTE — ANESTHESIA CARE TRANSFER NOTE
Patient: Christiana Lopes    Procedure(s):  left shoulder arthroscopy  Left shoulder hemiarthroplasy with bicep tenodesis    Diagnosis: Primary osteoarthritis of left shoulder [M19.012]  Diagnosis Additional Information: No value filed.    Anesthesia Type:   General, ETT, For Post-op pain in coordination with surgeon, Peripheral Nerve Block     Note:  Airway :Face Mask  Patient transferred to:PACU  Handoff Report: Identifed the Patient, Identified the Reponsible Provider, Reviewed the pertinent medical history, Discussed the surgical course, Reviewed Intra-OP anesthesia mangement and issues during anesthesia, Set expectations for post-procedure period and Allowed opportunity for questions and acknowledgement of understanding      Vitals: (Last set prior to Anesthesia Care Transfer)    CRNA VITALS  2/12/2020 0901 - 2/12/2020 0937      2/12/2020             Pulse:  64    SpO2:  100 %                Electronically Signed By: LOC Vazquez CRNA  February 12, 2020  9:37 AM

## 2020-02-12 NOTE — BRIEF OP NOTE
[facility]    Brief Operative Note    Pre-operative diagnosis: Primary osteoarthritis of left shoulder [M19.012]  Post-operative diagnosis Same as pre-operative diagnosis    Procedure: Procedure(s):  left shoulder arthroscopy  Left shoulder hemiarthroplasy with bicep tenodesis  Surgeon: Surgeon(s) and Role:     * Nura Cole MD - Primary     * Evelyn Ocampo PA-C - Assisting  Anesthesia: General   Estimated blood loss: Less than 50 ml  Drains: None  Specimens: * No specimens in log *  Findings:   None.  Complications: None.  Implants:   Implant Name Type Inv. Item Serial No.  Lot No. LRB No. Used   2.8mm QFix    [employer] [id number] Left 2   Aequalis resurfacing Head   [id number]   Left 1   Pin stainless steel   [id number]   Left 1

## 2020-02-12 NOTE — ANESTHESIA POSTPROCEDURE EVALUATION
Patient: Christiana Lopes    Procedure(s):  left shoulder arthroscopy  Left shoulder hemiarthroplasy with bicep tenodesis    Diagnosis:Primary osteoarthritis of left shoulder [M19.012]  Diagnosis Additional Information: No value filed.    Anesthesia Type:  General, ETT, For Post-op pain in coordination with surgeon, Peripheral Nerve Block    Note:  Anesthesia Post Evaluation    Patient location during evaluation: Phase 2  Patient participation: Able to fully participate in evaluation  Level of consciousness: awake and alert  Pain management: adequate  Airway patency: patent  Cardiovascular status: acceptable and stable  Respiratory status: acceptable and room air  Hydration status: acceptable  PONV: none     Anesthetic complications: None    Comments:  Patient was happy with the anesthesia care received and no anesthesia related complications were noted.  I will follow up with the patient again if it is needed.        Last vitals:  Vitals:    02/12/20 1130 02/12/20 1145 02/12/20 1215   BP: (!) 170/85 (!) 166/99 (!) 167/79   Pulse: 65 71 67   Resp:   16   Temp:      SpO2: 95% 98% 98%         Electronically Signed By: LOC Vazquez CRNA  February 12, 2020  1:48 PM

## 2020-02-13 NOTE — OP NOTE
Pre-operative diagnosis: Primary osteoarthritis of left shoulder [M19.012]   Post-operative diagnosis: Same   Procedure: Procedure(s):  left shoulder arthroscopy  Left shoulder hemiarthroplasy with bicep tenodesis   Surgeon:  Assistant(s): MD Evelyn Adam PA-C (A Physician Assisstant was necessary for her expertise, exposure and surgical assistance throughout the case.)   Anesthesia: General   Specimens: None   Findings: See dictated operative report for full details    Complications:   None   Disposition To PACU in stable condition   Implants: Implant Name Type Inv. Item Serial No.  Lot No. LRB No. Used   2.8mm QFix    [employer] [id number] Left 2   Aequalis resurfacing Head   [id number]   Left 1                             NOTE/INDICATIONS: Ms. Lopes is a 64 year old year-old  who presents for the above. she was evaluated in my office and received preoperative CT and Xrays which confirmed our clinical diagnosis of osteoarthritis of the left shoulder. Risks, benefits, alternatives, complications, limitations, and anticipated postop course were discussed at length. Risks included but not limited to infection, bleeding, blood clots, scar, scar tenderness, stiffness, skin problems with healing, and failure to relieve all symptoms were discussed at length. Also discussed surgery done under general anesthesia include risks of aspiration, strokes, heart attacks, and deaths. she agreed and would like to proceed with surgery.  All questions were answered.     DETAILS OF PROCEDURE: she was met in the preoperative care unit. Again, informed consent was verified. The appropriate extremity was marked. she was wheeled back to operative suite at [facility]. Ms. Lopes was transferred off the cart to the OR table without incident. All bony prominences were padded.  she was then placed under General .  Following this she positioned Beach Chair. she was then prepped and draped in the  normal standard fashion. When deemed safe, per the Anesthesia, after a time-out had been performed the procedure began.      A 5 mm tear portal was made in the shoulder.  The arthroscope was then introduced into this area.  Evaluation of the glenohumeral joint showed severe arthrosis on the humeral side.  There was some mild grade II chondromalacia of the glenoid side.  She did have some fraying.  The biceps was also significantly frayed.  There was no obvious rotator cuff tear.  The scope was then removed.  Dissection was then began anteriorly.  Deltopectoral incision was made and dissection was performed until we identified the pectoralis insertion.  This was just freed slightly and the deltoid was then elevated.  We were then able to see the subscapularis.  The bottom one third of the subscapularis was then incised transversely into its insertion the biceps tendon was then identified and tenodesed into the top portion of the pectoralis.  Subscapularis was then tagged and then peeled back.  The humeral head was then dislocated.  There was significant osteophytes around the rim.  This was carefully removed with a rongeur.  The head was then sized.  A guide was then placed and a guidepin was passed through both cortices.  The humeral head reamer was then utilized in about 5 mm of bone was then resected.  Any remaining bone on the periphery was then removed.  The punch was then used.  Irrigation was then performed performed with pulsatile lavage.  The biceps tendon was released from its origin.  The final humeral component was then impacted into position.  The head was then reduced.  And checked for range of motion.  There is good internal/external rotation was significantly improved from preoperatively.  Forward flexion was in good position.  There supraspinatus was then evaluated and there was no full-thickness tear that was noted.  2Q fix anchors were then placed in the subscapularis footprint.  These were then  passed and subscapularis was then repaired.  The inferior suture anchor was also used to tenodesis into the bone the biceps tendon.  Excess biceps tendon was then removed.  Irrigation was again performed.  Vancomycin powder was then placed into the wound.  The deep layer was then closed with 0 Vicryl followed by subcuticular stitch.  Negative pressure wound dressing was placed.  Patient was awakened taken to recovery in care of anesthesia there were no complications    Nura Cole MD

## 2020-02-13 NOTE — OR NURSING
[facility]  Discharge Call Back  Christiana Lopes  1955  MRN: [id number]  Home: [phone] (home)   PCP: Jigna Lees    We are calling to see how you're doing since your surgery/procedure with us?   Comments: great   Clinical Questions  1. Have you had time to look at your discharge instructions? Do you have any questions in regards to the instructions?   Comment: yes no  2. Do you feel your pain is being controlled with the regimen the surgeon sent you home on? (ie: prescription medications, over the counter pain medications, ice packs)   Comments: yes   3. Have you noticed any drainage on your dressing? Do you know what to do if you have bleeding as a result of your procedure?   Comments: no yes  4. Have you had any nausea/vomiting? Do you know how to treat this?   Comment: no yes   5. Have you had any signs/symptoms of infection? (ie: fever, swelling, heat, drainage or redness) Do you know what to do if you have?   Comment: no yes   6. Do you have a follow up appointment made with your surgeon? Do you have a number to contact them at if you need it?   Comment: yes  Retained Foreign Object (GAIL, Hemovac, Penrose, Wound Packing, Vaginal Packing, Nasal Splints, Urethral Stents, Stubbs Catheter)  1. Do you still have wound vac in place?   2. If the item is still in place, can you review the plan for removal with me? When batteries do dead then have instructions to peal off (10 days)      You may be randomly selected to fill out a [facility] survey. We would appreciate you taking the time to fill this out. It is important to us if you would answer all of the questions on the survey.

## 2020-02-18 ENCOUNTER — MYC MEDICAL ADVICE (OUTPATIENT)
Dept: FAMILY MEDICINE | Facility: OTHER | Age: 65
End: 2020-02-18

## 2020-02-18 DIAGNOSIS — M19.012 PRIMARY OSTEOARTHRITIS OF LEFT SHOULDER: ICD-10-CM

## 2020-02-18 RX ORDER — MELOXICAM 15 MG/1
15 TABLET ORAL DAILY PRN
Qty: 90 TABLET | Refills: 0 | Status: SHIPPED | OUTPATIENT
Start: 2020-02-18 | End: 2020-05-19

## 2020-02-25 ENCOUNTER — OFFICE VISIT (OUTPATIENT)
Dept: ORTHOPEDICS | Facility: CLINIC | Age: 65
End: 2020-02-25
Payer: COMMERCIAL

## 2020-02-25 ENCOUNTER — TELEPHONE (OUTPATIENT)
Dept: ORTHOPEDICS | Facility: CLINIC | Age: 65
End: 2020-02-25

## 2020-02-25 VITALS
HEIGHT: 61 IN | BODY MASS INDEX: 26.24 KG/M2 | WEIGHT: 139 LBS | SYSTOLIC BLOOD PRESSURE: 130 MMHG | DIASTOLIC BLOOD PRESSURE: 80 MMHG

## 2020-02-25 DIAGNOSIS — M19.012 PRIMARY OSTEOARTHRITIS OF LEFT SHOULDER: Primary | ICD-10-CM

## 2020-02-25 PROCEDURE — 99024 POSTOP FOLLOW-UP VISIT: CPT | Performed by: ORTHOPAEDIC SURGERY

## 2020-02-25 ASSESSMENT — PAIN SCALES - GENERAL: PAINLEVEL: NO PAIN (0)

## 2020-02-25 ASSESSMENT — MIFFLIN-ST. JEOR: SCORE: 1111.37

## 2020-02-25 NOTE — LETTER
2/25/2020         RE: Christiana Lopes  [address]  [place] [address]        Dear Colleague,    Thank you for referring your patient, Christiana Lopes, to the [facility]. Please see a copy of my visit note below.    Assessment / Plan   No diagnosis found.     Plan: We did a subscap sparing shoulder resurfacing.  At this point we can be quite aggressive with physical therapy as the upper portion of the subscap is intact.  I like for her to begin physical therapy for range of motion and strengthening.  She can follow-up in a couple of weeks.  Follow up      HPI / History / ROS   Christiana Lopes is a 64 year old female who is being seen for   Chief Complaint   Patient presents with     Surgical Followup     Left shoulder arthroscopy hemiarthroplasy with bicep tenodesis dos 2/12/20   She is 2 weeks out from hemiarthroplasty with the resurfacing of her left shoulder along with arthroscopy to evaluate her glenoid surface.  Her glenoid surface was reasonable although he did have some wear.  However it was in much better shape than her shoulder.  She is already having some improvement in range of motion.  She does complain of mild pain.  No other significant problems.    Medications  atenolol-chlorthalidone (TENORETIC) 50-25 MG tablet, Take 1 tablet by mouth daily (Patient taking differently: Take 1 tablet by mouth daily 1/2 tablet 2x day- per pt)  estradiol (ESTRACE) 1 MG tablet, Take 1 tablet (1 mg) by mouth daily  fish oil-omega-3 fatty acids (FISH OIL) 1000 MG capsule, Take 2 g by mouth daily.  hydrOXYzine (ATARAX) 25 MG tablet, Take 1 tablet (25 mg) by mouth 3 times daily as needed for other (augment pain control or for spasm)  MAGNESIUM PO,   meloxicam (MOBIC) 15 MG tablet, Take 1 tablet (15 mg) by mouth daily as needed  Methylsulfonylmethane (MSM PO),   Multiple Vitamin (MULTIVITAMIN OR), Take 1 tablet by mouth daily.  oxyCODONE (ROXICODONE) 5 MG tablet, Take 1-2 tablets (5-10  mg) by mouth every 4 hours as needed for moderate to severe pain  potassium chloride ER (K-TAB) 20 MEQ CR tablet, Take 1 tablet (20 mEq) by mouth 3 times daily  senna-docusate (SENOKOT-S/PERICOLACE) 8.6-50 MG tablet, Take 1-2 tablets by mouth 2 times daily  ST CHAMBERS WORT PO, Take by mouth 2 times daily     No current facility-administered medications on file prior to visit.          Allergies   No Known Allergies     PFSH  Past Medical History:   Diagnosis Date     Arthritis     ostio     Cervix cancer (H) 1990     Depression     after 's death     History of left breast biopsy 2011    negative     HTN      UTI     history of        Past Surgical History:   Procedure Laterality Date     ARTHROPLASTY SHOULDER Left 2/12/2020    Procedure: Left shoulder hemiarthroplasy with bicep tenodesis;  Surgeon: Nura Cole MD;  Location: [facility] OR     ARTHROSCOPY SHOULDER Left 2/12/2020    Procedure: left shoulder arthroscopy;  Surgeon: Nura Cole MD;  Location: [facility] OR     BACK SURGERY  2009     BIOPSY BREAST  2011    benign     C APPENDECTOMY       C ENLARGE BREAST  2006     C NONSPECIFIC PROCEDURE  2-04    ventral hernia repair     C NONSPECIFIC PROCEDURE  12/29/11    Lumbar fusion L5-S1     C NONSPECIFIC PROCEDURE      cervical fusion C3     C/SECTION, CLASSICAL      x2     COLONOSCOPY  2005     COLONOSCOPY N/A 2/12/2018    Procedure: COLONOSCOPY;  COLONOSCOPY;  Surgeon: Ken Fair MD;  Location: [facility] GI     HEAD & NECK SURGERY  2011     HERNIA REPAIR  2003     HYSTERECTOMY  1990    one ovary removed, h/o cervical cancer       Family History   Problem Relation Age of Onset     Gastrointestinal Disease Mother         kidney failure     Arthritis Mother      Cancer Mother         leukemia     Hypertension Mother 50     Breast Cancer Mother      Hypertension Maternal Grandmother 50     Arthritis Maternal Grandfather      Cerebrovascular Disease Maternal Grandfather      Cerebrovascular Disease  Paternal Grandfather      Cardiovascular Paternal Grandmother 90        hardening of the arteries     Cancer - colorectal Paternal Grandmother      Hypertension Brother 50     Hypertension Sister 50     Allergies Daughter      Allergies Daughter      Allergies Daughter      Macular Degeneration Paternal Aunt         dry     Macular Degeneration Paternal Aunt         wet     Asthma No family hx of      C.A.D. No family hx of      Diabetes No family hx of      Prostate Cancer No family hx of      Alcohol/Drug No family hx of      Alzheimer Disease No family hx of      Anesthesia Reaction No family hx of      Blood Disease No family hx of      Circulatory No family hx of      Congenital Anomalies No family hx of      Connective Tissue Disorder No family hx of      Depression No family hx of      Eye Disorder No family hx of      Genetic Disorder No family hx of      Genitourinary Problems No family hx of      Gynecology No family hx of      Heart Disease No family hx of      Lipids No family hx of      Musculoskeletal Disorder No family hx of      Neurologic Disorder No family hx of      Obesity No family hx of      Osteoporosis No family hx of      Psychotic Disorder No family hx of      Thyroid Disease No family hx of      Respiratory No family hx of      Glaucoma No family hx of        Social History     Tobacco Use     Smoking status: Former Smoker     Packs/day: 1.00     Years: 2.00     Pack years: 2.00     Types: Cigarettes     Last attempt to quit: 2010     Years since quittin.5     Smokeless tobacco: Never Used     Tobacco comment: no use of tobacco products   Substance Use Topics     Alcohol use: Yes     Comment: socially          ROS  Review of Systems   REVIEW OF SYSTEMS  General: negative for, night sweats, dizziness, fatigue  Resp: No shortness of breath and no cough  CV: negative for chest pain, syncope or near-syncope  GI: negative for nausea, vomiting and diarrhea  : negative for dysuria and  "hematuria  Musculoskeletal: as above  Neurologic: negative for syncope   Hematologic: negative for bleeding disorder    Physical / Results   Physical Exam         Physical Exam:  Vitals: /80   Ht 1.555 m (5' 1.22\")   Wt 63 kg (139 lb)   BMI 26.08 kg/m     BMI= Body mass index is 26.08 kg/m .  Constitutional: healthy, alert and no acute distress   Psychiatric: mentation appears normal and affect normal/bright  NEURO: no focal deficits  RESP: Normal with easy respirations and no use of accessory muscles to breathe, no audible wheezing or retractions  CV: Pulses are palpable  SKIN: Warm with no erythema  JOINT/EXTREMITIES: She is neurovascular intact.  Her incisions healed well.  She has good forward flexion and internal and external rotation without any significant pain.  She does have difficulty lifting it actively.          Nura Cole MD    Again, thank you for allowing me to participate in the care of your patient.        Sincerely,        Nura Cole MD    "

## 2020-02-25 NOTE — TELEPHONE ENCOUNTER
Received forms from [employer] regarding patient's spouse, Harrison Lopes to have FMLA to care for Karen after her shoulder surgery on 2/12/2020.     Will call  tomorrow to see how long he was planning to be off.   Form placed back in Dr. Abisai mckenzie.     Aleksandra GARCIA Lead RN, BSN. . .  2/25/2020, 3:56 PM

## 2020-02-26 NOTE — TELEPHONE ENCOUNTER
Writer called patient to gain clarification on the forms. She said that  needs to be off work 2/12/20-2/23/20. Upon completion: fax to number on form ([phone]), also please mail a copy to patient.    Bethany Wong RN on 2/26/2020 at 4:23 PM

## 2020-02-26 NOTE — TELEPHONE ENCOUNTER
Form completed to the best of my ability. Placed in MD mailbox for completion.  Bethany Wong RN on 2/26/2020 at 4:40 PM

## 2020-02-27 NOTE — TELEPHONE ENCOUNTER
I have faxed, mailed original to patient and made a copy for scanning and put a copy in my bottom drawer.    Evelyn/LEE

## 2020-02-27 NOTE — PROGRESS NOTES
Assessment / Plan   No diagnosis found.     Plan: We did a subscap sparing shoulder resurfacing.  At this point we can be quite aggressive with physical therapy as the upper portion of the subscap is intact.  I like for her to begin physical therapy for range of motion and strengthening.  She can follow-up in a couple of weeks.  Follow up      HPI / History / ROS   Christiana Lopes is a 64 year old female who is being seen for   Chief Complaint   Patient presents with     Surgical Followup     Left shoulder arthroscopy hemiarthroplasy with bicep tenodesis dos 2/12/20   She is 2 weeks out from hemiarthroplasty with the resurfacing of her left shoulder along with arthroscopy to evaluate her glenoid surface.  Her glenoid surface was reasonable although he did have some wear.  However it was in much better shape than her shoulder.  She is already having some improvement in range of motion.  She does complain of mild pain.  No other significant problems.    Medications  atenolol-chlorthalidone (TENORETIC) 50-25 MG tablet, Take 1 tablet by mouth daily (Patient taking differently: Take 1 tablet by mouth daily 1/2 tablet 2x day- per pt)  estradiol (ESTRACE) 1 MG tablet, Take 1 tablet (1 mg) by mouth daily  fish oil-omega-3 fatty acids (FISH OIL) 1000 MG capsule, Take 2 g by mouth daily.  hydrOXYzine (ATARAX) 25 MG tablet, Take 1 tablet (25 mg) by mouth 3 times daily as needed for other (augment pain control or for spasm)  MAGNESIUM PO,   meloxicam (MOBIC) 15 MG tablet, Take 1 tablet (15 mg) by mouth daily as needed  Methylsulfonylmethane (MSM PO),   Multiple Vitamin (MULTIVITAMIN OR), Take 1 tablet by mouth daily.  oxyCODONE (ROXICODONE) 5 MG tablet, Take 1-2 tablets (5-10 mg) by mouth every 4 hours as needed for moderate to severe pain  potassium chloride ER (K-TAB) 20 MEQ CR tablet, Take 1 tablet (20 mEq) by mouth 3 times daily  senna-docusate (SENOKOT-S/PERICOLACE) 8.6-50 MG tablet, Take 1-2 tablets by mouth 2 times  daily  [employer] WORT PO, Take by mouth 2 times daily     No current facility-administered medications on file prior to visit.          Allergies   No Known Allergies     PFSH  Past Medical History:   Diagnosis Date     Arthritis     ostio     Cervix cancer (H) 1990     Depression     after 's death     History of left breast biopsy 2011    negative     HTN      UTI     history of        Past Surgical History:   Procedure Laterality Date     ARTHROPLASTY SHOULDER Left 2/12/2020    Procedure: Left shoulder hemiarthroplasy with bicep tenodesis;  Surgeon: Nura Cole MD;  Location: [facility] OR     ARTHROSCOPY SHOULDER Left 2/12/2020    Procedure: left shoulder arthroscopy;  Surgeon: Nura Cole MD;  Location: [facility] OR     BACK SURGERY  2009     BIOPSY BREAST  2011    benign     C APPENDECTOMY       C ENLARGE BREAST  2006     C NONSPECIFIC PROCEDURE  2-04    ventral hernia repair     C NONSPECIFIC PROCEDURE  12/29/11    Lumbar fusion L5-S1     C NONSPECIFIC PROCEDURE      cervical fusion C3     C/SECTION, CLASSICAL      x2     COLONOSCOPY  2005     COLONOSCOPY N/A 2/12/2018    Procedure: COLONOSCOPY;  COLONOSCOPY;  Surgeon: Ken Fair MD;  Location:  GI     HEAD & NECK SURGERY  2011     HERNIA REPAIR  2003     HYSTERECTOMY  1990    one ovary removed, h/o cervical cancer       Family History   Problem Relation Age of Onset     Gastrointestinal Disease Mother         kidney failure     Arthritis Mother      Cancer Mother         leukemia     Hypertension Mother 50     Breast Cancer Mother      Hypertension Maternal Grandmother 50     Arthritis Maternal Grandfather      Cerebrovascular Disease Maternal Grandfather      Cerebrovascular Disease Paternal Grandfather      Cardiovascular Paternal Grandmother 90        hardening of the arteries     Cancer - colorectal Paternal Grandmother      Hypertension Brother 50     Hypertension Sister 50     Allergies Daughter      Allergies Daughter       "Allergies Daughter      Macular Degeneration Paternal Aunt         dry     Macular Degeneration Paternal Aunt         wet     Asthma No family hx of      C.A.D. No family hx of      Diabetes No family hx of      Prostate Cancer No family hx of      Alcohol/Drug No family hx of      Alzheimer Disease No family hx of      Anesthesia Reaction No family hx of      Blood Disease No family hx of      Circulatory No family hx of      Congenital Anomalies No family hx of      Connective Tissue Disorder No family hx of      Depression No family hx of      Eye Disorder No family hx of      Genetic Disorder No family hx of      Genitourinary Problems No family hx of      Gynecology No family hx of      Heart Disease No family hx of      Lipids No family hx of      Musculoskeletal Disorder No family hx of      Neurologic Disorder No family hx of      Obesity No family hx of      Osteoporosis No family hx of      Psychotic Disorder No family hx of      Thyroid Disease No family hx of      Respiratory No family hx of      Glaucoma No family hx of        Social History     Tobacco Use     Smoking status: Former Smoker     Packs/day: 1.00     Years: 2.00     Pack years: 2.00     Types: Cigarettes     Last attempt to quit: 2010     Years since quittin.5     Smokeless tobacco: Never Used     Tobacco comment: no use of tobacco products   Substance Use Topics     Alcohol use: Yes     Comment: socially          ROS  Review of Systems   REVIEW OF SYSTEMS  General: negative for, night sweats, dizziness, fatigue  Resp: No shortness of breath and no cough  CV: negative for chest pain, syncope or near-syncope  GI: negative for nausea, vomiting and diarrhea  : negative for dysuria and hematuria  Musculoskeletal: as above  Neurologic: negative for syncope   Hematologic: negative for bleeding disorder    Physical / Results   Physical Exam         Physical Exam:  Vitals: /80   Ht 1.555 m (5' 1.22\")   Wt 63 kg (139 lb)   BMI " 26.08 kg/m    BMI= Body mass index is 26.08 kg/m .  Constitutional: healthy, alert and no acute distress   Psychiatric: mentation appears normal and affect normal/bright  NEURO: no focal deficits  RESP: Normal with easy respirations and no use of accessory muscles to breathe, no audible wheezing or retractions  CV: Pulses are palpable  SKIN: Warm with no erythema  JOINT/EXTREMITIES: She is neurovascular intact.  Her incisions healed well.  She has good forward flexion and internal and external rotation without any significant pain.  She does have difficulty lifting it actively.          Nura Cole MD

## 2020-02-27 NOTE — TELEPHONE ENCOUNTER
Left message for patient to return call to schedule colonoscopy or EGD. If Zakia or Charisse are unavailable, please transfer to the surgery center.     OK to schedule with NERISSA or Gabriele     clindamycin phos-benzoyl perox (DUAC) 1.2-5 % external gel     Last Written Prescription Date:  2/18/19  Last Fill Quantity: 45 g,   # refills: 11  Last Office Visit: 4/19/19  Future Office visit:       Routing refill request to provider for review/approval because:  Drug not on the [facility], [facility] or [facility] refill protocol or controlled substance

## 2020-03-09 ENCOUNTER — THERAPY VISIT (OUTPATIENT)
Dept: PHYSICAL THERAPY | Facility: CLINIC | Age: 65
End: 2020-03-09
Payer: COMMERCIAL

## 2020-03-09 DIAGNOSIS — M19.012 PRIMARY OSTEOARTHRITIS OF LEFT SHOULDER: ICD-10-CM

## 2020-03-09 PROCEDURE — 97161 PT EVAL LOW COMPLEX 20 MIN: CPT | Mod: GP | Performed by: PHYSICAL THERAPIST

## 2020-03-09 PROCEDURE — 97110 THERAPEUTIC EXERCISES: CPT | Mod: GP | Performed by: PHYSICAL THERAPIST

## 2020-03-09 NOTE — PROGRESS NOTES
[facility] Initial Evaluation  Subjective:  Pt presents to PT following a ricky arthroplasty on the L shoulder. States her shoulder is doing ok, states she can do things down and in front of her like but is unable to do anything over about 60 deg elevation. Pain in the shoulder is rated at 1-3/10 level with movement, achy at the end of the day, can be pain free at rest.     The history is provided by the patient. No  was used.   Therapist Generated HPI Evaluation         Type of problem:  Left shoulder.    This is a new condition.  Occurance: PUlled by horse, no post op ricky arthroplasty   Where condition occurred: at home.  Patient reports pain:  Lateral, posterior and anterior.  Pain is described as aching and is intermittent.  Pain radiates to:  Shoulder and upper arm. Pain is worse in the P.M..  Since onset symptoms are gradually improving.  Associated symptoms:  Loss of strength and loss of motion/stiffness. Symptoms are exacerbated by certain positions  and relieved by NSAID's.  Special tests included:  X-ray and MRI.  Previous treatment includes surgery. There was mild improvement following previous treatment.  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  Home/work barriers: difficulty with any use of the LUE.    Patient Health History         Pain is reported as 2/10 (0-3/10) on pain scale.  General health as reported by patient is good.  Pertinent medical history includes: osteoarthritis, cancer and high blood pressure.   Red flags:  None as reported by patient.     Surgeries include:  Cancer surgery and orthopedic surgery (Cervical CA, neck, back ).    Current medications:  High blood pressure medication.    Current occupation is retired .   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                                     Objective:  Standing Alignment:    Cervical/Thoracic:  Forward head  Shoulder/UE:  Rounded shoulders                                       Shoulder Evaluation:  ROM:  AROM:  : Tight but well healed scar   Flexion:  Left:  141    Right:  85    Abduction:  Left: 105   Right:  70                            Strength:  not assessed                      Stability Testing:  not assessed      Special Tests:  not assessed      Palpation:  Palpation assessed shoulder: scar   Left shoulder tenderness present at:  Biceps; Incisional and Bicipital Groove  Left shoulder tenderness not present at: Clavicle; Sternoclavicular; Acrimioclavicular; Triceps; Supraspinatus; Infraspinatus; Teres Minor; Subscapularis; Deltoid; Levator; Rhomboids or Upper Trap    Mobility Tests:  not assessed                                                 General     ROS    Assessment/Plan:    Patient is a 64 year old female with left side shoulder complaints.    Patient has the following significant findings with corresponding treatment plan.                Diagnosis 1:  S/p L shoulder ricky arthroplasty  Pain -  hot/cold therapy, manual therapy, self management, education, directional preference exercise and home program  Decreased ROM/flexibility - manual therapy, therapeutic exercise and home program  Decreased strength - therapeutic exercise, therapeutic activities and home program  Inflammation - cold therapy and self management/home program  Impaired muscle performance - neuro re-education and home program  Decreased function - therapeutic activities and home program  Impaired posture - neuro re-education and home program    Therapy Evaluation Codes:   1) History comprised of:   Personal factors that impact the plan of care:      None.    Comorbidity factors that impact the plan of care are:      Osteoarthritis.     Medications impacting care: High blood pressure.  2) Examination of Body Systems comprised of:   Body structures and functions that  impact the plan of care:      Shoulder.   Activity limitations that impact the plan of care are:      Cooking, Dressing, Lifting and reaching, carrying.  3) Clinical presentation characteristics are:   Stable/Uncomplicated.  4) Decision-Making    Low complexity using standardized patient assessment instrument and/or measureable assessment of functional outcome.  Cumulative Therapy Evaluation is: Low complexity.    Previous and current functional limitations:  (See Goal Flow Sheet for this information)    Short term and Long term goals: (See Goal Flow Sheet for this information)     Communication ability:  Patient appears to be able to clearly communicate and understand verbal and written communication and follow directions correctly.  Treatment Explanation - The following has been discussed with the patient:   RX ordered/plan of care  Anticipated outcomes  Possible risks and side effects  This patient would benefit from PT intervention to resume normal activities.   Rehab potential is good.    Frequency:  1 X week, once daily  Duration:  for 10 weeks  Discharge Plan:  Achieve all LTG.  Independent in home treatment program.  Reach maximal therapeutic benefit.    Please refer to the daily flowsheet for treatment today, total treatment time and time spent performing 1:1 timed codes.

## 2020-03-09 NOTE — LETTER
[facility]  [address]  [facility] [id number] [phone]    2020    Re: Christiana Lopes   :   1955  MRN:  [id number]   REFERRING PHYSICIAN:   Nura Cole    [facility]    Date of Initial Evaluation:  ***  Visits:  Rxs Used: 1  Reason for Referral:  Primary osteoarthritis of left shoulder    EVALUATION SUMMARY    [facility] Initial Evaluation  Subjective:  Pt presents to PT following a ricky arthroplasty on the L shoulder. States her shoulder is doing ok, states she can do things down and in front of her like but is unable to do anything over about 60 deg elevation. Pain in the shoulder is rated at 1-3/10 level with movement, achy at the end of the day, can be pain free at rest.     The history is provided by the patient. No  was used.   Therapist Generated HPI Evaluation         Type of problem:  Left shoulder.    This is a new condition.  Occurance: PUlled by horse, no post op ricky arthroplasty   Where condition occurred: at home.  Patient reports pain:  Lateral, posterior and anterior.  Pain is described as aching and is intermittent.  Pain radiates to:  Shoulder and upper arm. Pain is worse in the P.M..  Since onset symptoms are gradually improving.  Associated symptoms:  Loss of strength and loss of motion/stiffness. Symptoms are exacerbated by certain positions  and relieved by NSAID's.  Special tests included:  X-ray and MRI.  Previous treatment includes surgery. There was mild improvement following previous treatment.  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  Home/work barriers: difficulty with any use of the LUE.    Patient Health History         Pain is reported as 2/10 (0-3/10) on pain scale.  General health as reported by patient is good.  Pertinent medical history includes:  osteoarthritis, cancer and high blood pressure.   Red flags:  None as reported by patient.     Surgeries include:  Cancer surgery and orthopedic surgery (Cervical CA, neck, back ).    Current medications:  High blood pressure medication.    Current occupation is retired .   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                                    Objective:  Standing Alignment:    Cervical/Thoracic:  Forward head  Shoulder/UE:  Rounded shoulders                                       Shoulder Evaluation:  ROM:  AROM:  : Tight but well healed scar   Flexion:  Left:  141    Right:  85    Abduction:  Left: 105   Right:  70                            Strength:  not assessed                      Stability Testing:  not assessed      Special Tests:  not assessed      Palpation:  Palpation assessed shoulder: scar   Left shoulder tenderness present at:  Biceps; Incisional and Bicipital Groove  Left shoulder tenderness not present at: Clavicle; Sternoclavicular; Acrimioclavicular; Triceps; Supraspinatus; Infraspinatus; Teres Minor; Subscapularis; Deltoid; Levator; Rhomboids or Upper Trap    Mobility Tests:  not assessed                                                 General     ROS    Assessment/Plan:    Patient is a 64 year old female with left side shoulder complaints.    Patient has the following significant findings with corresponding treatment plan.                Diagnosis 1:  S/p L shoulder ricky arthroplasty  Pain -  hot/cold therapy, manual therapy, self management, education, directional preference exercise and home program  Decreased ROM/flexibility - manual therapy, therapeutic exercise and home program  Decreased strength - therapeutic exercise, therapeutic activities and home program  Inflammation - cold therapy and self management/home program  Impaired muscle performance - neuro re-education and home  program  Decreased function - therapeutic activities and home program  Impaired posture - neuro re-education and home program    Therapy Evaluation Codes:   1) History comprised of:   Personal factors that impact the plan of care:      None.    Comorbidity factors that impact the plan of care are:      Osteoarthritis.     Medications impacting care: High blood pressure.  2) Examination of Body Systems comprised of:   Body structures and functions that impact the plan of care:      Shoulder.   Activity limitations that impact the plan of care are:      Cooking, Dressing, Lifting and reaching, carrying.  3) Clinical presentation characteristics are:   Stable/Uncomplicated.  4) Decision-Making    Low complexity using standardized patient assessment instrument and/or measureable assessment of functional outcome.  Cumulative Therapy Evaluation is: Low complexity.    Previous and current functional limitations:  (See Goal Flow Sheet for this information)    Short term and Long term goals: (See Goal Flow Sheet for this information)     Communication ability:  Patient appears to be able to clearly communicate and understand verbal and written communication and follow directions correctly.  Treatment Explanation - The following has been discussed with the patient:   RX ordered/plan of care  Anticipated outcomes  Possible risks and side effects  This patient would benefit from PT intervention to resume normal activities.   Rehab potential is good.    Frequency:  1 X week, once daily  Duration:  for 10 weeks  Discharge Plan:  Achieve all LTG.  Independent in home treatment program.  Reach maximal therapeutic benefit.        Thank you for your referral.    INQUIRIES  Therapist:   [facility]  [address]  [facility] [address]  Phone: [phone]  Fax: [phone]

## 2020-03-10 ENCOUNTER — OFFICE VISIT (OUTPATIENT)
Dept: ORTHOPEDICS | Facility: CLINIC | Age: 65
End: 2020-03-10
Payer: COMMERCIAL

## 2020-03-10 DIAGNOSIS — Z96.612 S/P SHOULDER HEMIARTHROPLASTY, LEFT: ICD-10-CM

## 2020-03-10 DIAGNOSIS — Z48.89 AFTERCARE FOLLOWING SURGERY: Primary | ICD-10-CM

## 2020-03-10 PROCEDURE — 99024 POSTOP FOLLOW-UP VISIT: CPT | Performed by: ORTHOPAEDIC SURGERY

## 2020-03-10 NOTE — PROGRESS NOTES
Orthopedic Clinic Post-Operative Note    CHIEF COMPLAINT:   Chief Complaint   Patient presents with     Surgical Followup     Left shoulder arthroscopy hemiarthroplasy with bicep tenodesis dos 2/12/20       HISTORY OF PRESENT ILLNESS  Location: Left superior shoulder   Rating of Pain: No pain or minimal pain  Pain is better with: Rest  Pain improving overall: Yes.  Patient feels like she is able to do everything she was able to do prior to surgery.  She feels she is improving.  Associated Features: Patient feels tightness but no pain.  Patient concerns: Wondering about her progress.  Wondering what she can lift and what she can do and what her restrictions are.  Additional History: Patient is very happy that she had the surgery and feels that it has changed her life.  Patient denies any numbness or tingling or any problems with the incision.    Patient's past medical, surgical, social and family histories reviewed.     Past Medical History:   Diagnosis Date     Arthritis     ostio     Cervix cancer (H) 1990     Depression     after 's death     History of left breast biopsy 2011    negative     HTN      UTI     history of        Past Surgical History:   Procedure Laterality Date     ARTHROPLASTY SHOULDER Left 2/12/2020    Procedure: Left shoulder hemiarthroplasy with bicep tenodesis;  Surgeon: Nura Cole MD;  Location: [facility]     ARTHROSCOPY SHOULDER Left 2/12/2020    Procedure: left shoulder arthroscopy;  Surgeon: Nura Cole MD;  Location: [facility]     BACK SURGERY  2009     BIOPSY BREAST  2011    benign     C APPENDECTOMY       C ENLARGE BREAST  2006     C NONSPECIFIC PROCEDURE  2-04    ventral hernia repair     C NONSPECIFIC PROCEDURE  12/29/11    Lumbar fusion L5-S1     C NONSPECIFIC PROCEDURE      cervical fusion C3     C/SECTION, CLASSICAL      x2     COLONOSCOPY  2005     COLONOSCOPY N/A 2/12/2018    Procedure: COLONOSCOPY;  COLONOSCOPY;  Surgeon: Ken Fair MD;  Location:  [facility]     HEAD & NECK SURGERY       HERNIA REPAIR       HYSTERECTOMY      one ovary removed, h/o cervical cancer       Medications:  atenolol-chlorthalidone (TENORETIC) 50-25 MG tablet, Take 1 tablet by mouth daily (Patient taking differently: Take 1 tablet by mouth daily 1/2 tablet 2x day- per pt)  estradiol (ESTRACE) 1 MG tablet, Take 1 tablet (1 mg) by mouth daily  fish oil-omega-3 fatty acids (FISH OIL) 1000 MG capsule, Take 2 g by mouth daily.  hydrOXYzine (ATARAX) 25 MG tablet, Take 1 tablet (25 mg) by mouth 3 times daily as needed for other (augment pain control or for spasm)  MAGNESIUM PO,   meloxicam (MOBIC) 15 MG tablet, Take 1 tablet (15 mg) by mouth daily as needed  Methylsulfonylmethane (MSM PO),   Multiple Vitamin (MULTIVITAMIN OR), Take 1 tablet by mouth daily.  oxyCODONE (ROXICODONE) 5 MG tablet, Take 1-2 tablets (5-10 mg) by mouth every 4 hours as needed for moderate to severe pain  potassium chloride ER (K-TAB) 20 MEQ CR tablet, Take 1 tablet (20 mEq) by mouth 3 times daily  senna-docusate (SENOKOT-S/PERICOLACE) 8.6-50 MG tablet, Take 1-2 tablets by mouth 2 times daily  ST CHAMBERS WORT PO, Take by mouth 2 times daily     No current facility-administered medications on file prior to visit.       No Known Allergies    Social History     Occupational History     Not on file   Tobacco Use     Smoking status: Former Smoker     Packs/day: 1.00     Years: 2.00     Pack years: 2.00     Types: Cigarettes     Last attempt to quit: 2010     Years since quittin.6     Smokeless tobacco: Never Used     Tobacco comment: no use of tobacco products   Substance and Sexual Activity     Alcohol use: Yes     Comment: socially     Drug use: No     Sexual activity: Not Currently     Partners: Male     Birth control/protection: Surgical       Family History   Problem Relation Age of Onset     Gastrointestinal Disease Mother         kidney failure     Arthritis Mother      Cancer Mother         leukemia      Hypertension Mother 50     Breast Cancer Mother      Hypertension Maternal Grandmother 50     Arthritis Maternal Grandfather      Cerebrovascular Disease Maternal Grandfather      Cerebrovascular Disease Paternal Grandfather      Cardiovascular Paternal Grandmother 90        hardening of the arteries     Cancer - colorectal Paternal Grandmother      Hypertension Brother 50     Hypertension Sister 50     Allergies Daughter      Allergies Daughter      Allergies Daughter      Macular Degeneration Paternal Aunt         dry     Macular Degeneration Paternal Aunt         wet     Asthma No family hx of      C.A.D. No family hx of      Diabetes No family hx of      Prostate Cancer No family hx of      Alcohol/Drug No family hx of      Alzheimer Disease No family hx of      Anesthesia Reaction No family hx of      Blood Disease No family hx of      Circulatory No family hx of      Congenital Anomalies No family hx of      Connective Tissue Disorder No family hx of      Depression No family hx of      Eye Disorder No family hx of      Genetic Disorder No family hx of      Genitourinary Problems No family hx of      Gynecology No family hx of      Heart Disease No family hx of      Lipids No family hx of      Musculoskeletal Disorder No family hx of      Neurologic Disorder No family hx of      Obesity No family hx of      Osteoporosis No family hx of      Psychotic Disorder No family hx of      Thyroid Disease No family hx of      Respiratory No family hx of      Glaucoma No family hx of        REVIEW OF SYSTEMS  General: negative for, night sweats, dizziness, fatigue  Resp: No shortness of breath and no cough  CV: negative for chest pain, syncope or near-syncope  GI: negative for nausea, vomiting and diarrhea  : negative for dysuria and hematuria  Musculoskeletal: as above  Neurologic: negative for syncope   Hematologic: negative for bleeding disorder    Physical Exam:  Vitals: There were no vitals taken for this  visit.  BMI= There is no height or weight on file to calculate BMI.  Constitutional: healthy, alert and no acute distress   Psychiatric: mentation appears normal and affect normal/bright  NEURO: no focal deficits, CMS intact left upper extremity   RESP: Normal with easy respirations and no use of accessory muscles to breathe, no audible wheezing or retractions  CV: +2 radial pulse and her hand is warm to palpation.   SKIN: No erythema, rashes, excoriation, or breakdown. No evidence of infection of the skin that I see today.  I did not have her take her shirt off to check the incision today it should be well-healed at this point.  MUSCULOSKELETAL:    INSPECTION of left shoulder: No gross deformities    PALPATION: No tenderness AC joint, proximal biceps tendon, clavicle, lateral shoulder, posterior shoulder, trapezius area. No increased warmth noted.     ROM: Passive: Forward flexion to 90 degrees with 180 on the contralateral side, abduction to 120 degrees with 180 on the contralateral side, external rotation to 35 degrees with 90 on the contralateral side, internal rotation to back pocket with lumbar spine on the contralateral side.  All range of motion is without catching, locking but does have some pain at maximal range of motion in all directions.      STRENGTH: 5 out of 5 deltoid as well as internal and external rotation 5 out of 5 , interosseous and thumb without pain.     SPECIAL TEST: Patient has a negative negative belly press and negative liftoff test, negative empty can and full can test, negative external rotator lag sign, negative drop test   GAIT: non-antalgic  Lymph: no palpable lymph nodes    Diagnostic Modalities:  Recent Results (from the past 744 hour(s))   XR Shoulder Left 2 Views    Narrative    LEFT SHOULDER TWO VIEWS  2/12/2020 10:24 AM     HISTORY: Status post shoulder hemiarthroplasty, left.    COMPARISON: CT left shoulder 2/5/2020.      Impression    IMPRESSION: Interval placement of a  left humeral head hemiarthroplasty  with the arthroplasty component in the expected location.  Postoperative soft tissue air is seen. An old healed left clavicular  shaft fracture is noted.    TAMAR GAMBOA MD     We agree with the above reading.    Independent visualization of the images was performed.      Impression: 1.  27 days status post left shoulder hemiarthroplasty with biceps tenodesis (subscap sparing resurfacing) by Dr. Cole    Plan:   This patient is doing excellent at this point.  She can continue to do range of motion and strengthening without restrictions.  She has done 1 formal physical therapy session.  She is educated to work on her range of motion several times a day.  The only motion we would caution her against is aggressive external rotation.  We feel she will have most of her function and strength back when she is 2 to 3 months out from surgery.  She will make gains up to 1 year after surgery.  The plan is to see her back in 6 weeks without an x-ray to check her strength and range of motion and then see her back on an as-needed basis.    Re-x-ray on return: No    BP Readings from Last 1 Encounters:   02/25/20 130/80       BP noted to be well controlled today in office.      Patient does not use Tobacco products.    Scribed by:    Harrison Borges PA-C   2:19 PM  3/10/2020    The information in this document, created by a scribe for me, accurately reflects the services I personally performed and the decisions made by me. I have reviewed and approved this document for accuracy    Nura Cole MD

## 2020-03-10 NOTE — LETTER
3/10/2020         RE: Christiana Lopes  [address]  [facility] [address]        Dear Colleague,    Thank you for referring your patient, Christiana Lopes, to the [facility]. Please see a copy of my visit note below.    Orthopedic Clinic Post-Operative Note    CHIEF COMPLAINT:   Chief Complaint   Patient presents with     Surgical Followup     Left shoulder arthroscopy hemiarthroplasy with bicep tenodesis dos 2/12/20       HISTORY OF PRESENT ILLNESS  Location: Left superior shoulder   Rating of Pain: No pain or minimal pain  Pain is better with: Rest  Pain improving overall: Yes.  Patient feels like she is able to do everything she was able to do prior to surgery.  She feels she is improving.  Associated Features: Patient feels tightness but no pain.  Patient concerns: Wondering about her progress.  Wondering what she can lift and what she can do and what her restrictions are.  Additional History: Patient is very happy that she had the surgery and feels that it has changed her life.  Patient denies any numbness or tingling or any problems with the incision.    Patient's past medical, surgical, social and family histories reviewed.     Past Medical History:   Diagnosis Date     Arthritis     ostio     Cervix cancer (H) 1990     Depression     after 's death     History of left breast biopsy 2011    negative     HTN      UTI     history of        Past Surgical History:   Procedure Laterality Date     ARTHROPLASTY SHOULDER Left 2/12/2020    Procedure: Left shoulder hemiarthroplasy with bicep tenodesis;  Surgeon: Nura Cole MD;  [facility]: [facility]     ARTHROSCOPY SHOULDER Left 2/12/2020    Procedure: left shoulder arthroscopy;  Surgeon: Nura Cole MD;  Location: [facility]     BACK SURGERY  2009     BIOPSY BREAST  2011    benign     C APPENDECTOMY       C ENLARGE BREAST  2006     C NONSPECIFIC PROCEDURE  2-04    ventral hernia repair     C NONSPECIFIC PROCEDURE   11    Lumbar fusion L5-S1     C NONSPECIFIC PROCEDURE      cervical fusion C3     C/SECTION, CLASSICAL      x2     COLONOSCOPY       COLONOSCOPY N/A 2018    Procedure: COLONOSCOPY;  COLONOSCOPY;  Surgeon: Ken Fair MD;  Location:  GI     HEAD & NECK SURGERY       HERNIA REPAIR       HYSTERECTOMY      one ovary removed, h/o cervical cancer       Medications:  atenolol-chlorthalidone (TENORETIC) 50-25 MG tablet, Take 1 tablet by mouth daily (Patient taking differently: Take 1 tablet by mouth daily 1/2 tablet 2x day- per pt)  estradiol (ESTRACE) 1 MG tablet, Take 1 tablet (1 mg) by mouth daily  fish oil-omega-3 fatty acids (FISH OIL) 1000 MG capsule, Take 2 g by mouth daily.  hydrOXYzine (ATARAX) 25 MG tablet, Take 1 tablet (25 mg) by mouth 3 times daily as needed for other (augment pain control or for spasm)  MAGNESIUM PO,   meloxicam (MOBIC) 15 MG tablet, Take 1 tablet (15 mg) by mouth daily as needed  Methylsulfonylmethane (MSM PO),   Multiple Vitamin (MULTIVITAMIN OR), Take 1 tablet by mouth daily.  oxyCODONE (ROXICODONE) 5 MG tablet, Take 1-2 tablets (5-10 mg) by mouth every 4 hours as needed for moderate to severe pain  potassium chloride ER (K-TAB) 20 MEQ CR tablet, Take 1 tablet (20 mEq) by mouth 3 times daily  senna-docusate (SENOKOT-S/PERICOLACE) 8.6-50 MG tablet, Take 1-2 tablets by mouth 2 times daily  ST CHAMBERS WORT PO, Take by mouth 2 times daily     No current facility-administered medications on file prior to visit.       No Known Allergies    Social History     Occupational History     Not on file   Tobacco Use     Smoking status: Former Smoker     Packs/day: 1.00     Years: 2.00     Pack years: 2.00     Types: Cigarettes     Last attempt to quit: 2010     Years since quittin.6     Smokeless tobacco: Never Used     Tobacco comment: no use of tobacco products   Substance and Sexual Activity     Alcohol use: Yes     Comment: socially     Drug use: No      Sexual activity: Not Currently     Partners: Male     Birth control/protection: Surgical       Family History   Problem Relation Age of Onset     Gastrointestinal Disease Mother         kidney failure     Arthritis Mother      Cancer Mother         leukemia     Hypertension Mother 50     Breast Cancer Mother      Hypertension Maternal Grandmother 50     Arthritis Maternal Grandfather      Cerebrovascular Disease Maternal Grandfather      Cerebrovascular Disease Paternal Grandfather      Cardiovascular Paternal Grandmother 90        hardening of the arteries     Cancer - colorectal Paternal Grandmother      Hypertension Brother 50     Hypertension Sister 50     Allergies Daughter      Allergies Daughter      Allergies Daughter      Macular Degeneration Paternal Aunt         dry     Macular Degeneration Paternal Aunt         wet     Asthma No family hx of      C.A.D. No family hx of      Diabetes No family hx of      Prostate Cancer No family hx of      Alcohol/Drug No family hx of      Alzheimer Disease No family hx of      Anesthesia Reaction No family hx of      Blood Disease No family hx of      Circulatory No family hx of      Congenital Anomalies No family hx of      Connective Tissue Disorder No family hx of      Depression No family hx of      Eye Disorder No family hx of      Genetic Disorder No family hx of      Genitourinary Problems No family hx of      Gynecology No family hx of      Heart Disease No family hx of      Lipids No family hx of      Musculoskeletal Disorder No family hx of      Neurologic Disorder No family hx of      Obesity No family hx of      Osteoporosis No family hx of      Psychotic Disorder No family hx of      Thyroid Disease No family hx of      Respiratory No family hx of      Glaucoma No family hx of        REVIEW OF SYSTEMS  General: negative for, night sweats, dizziness, fatigue  Resp: No shortness of breath and no cough  CV: negative for chest pain, syncope or near-syncope  GI:  negative for nausea, vomiting and diarrhea  : negative for dysuria and hematuria  Musculoskeletal: as above  Neurologic: negative for syncope   Hematologic: negative for bleeding disorder    Physical Exam:  Vitals: There were no vitals taken for this visit.  BMI= There is no height or weight on file to calculate BMI.  Constitutional: healthy, alert and no acute distress   Psychiatric: mentation appears normal and affect normal/bright  NEURO: no focal deficits, CMS intact left upper extremity   RESP: Normal with easy respirations and no use of accessory muscles to breathe, no audible wheezing or retractions  CV: +2 radial pulse and her hand is warm to palpation.   SKIN: No erythema, rashes, excoriation, or breakdown. No evidence of infection of the skin that I see today.  I did not have her take her shirt off to check the incision today it should be well-healed at this point.  MUSCULOSKELETAL:    INSPECTION of left shoulder: No gross deformities    PALPATION: No tenderness AC joint, proximal biceps tendon, clavicle, lateral shoulder, posterior shoulder, trapezius area. No increased warmth noted.     ROM: Passive: Forward flexion to 90 degrees with 180 on the contralateral side, abduction to 120 degrees with 180 on the contralateral side, external rotation to 35 degrees with 90 on the contralateral side, internal rotation to back pocket with lumbar spine on the contralateral side.  All range of motion is without catching, locking but does have some pain at maximal range of motion in all directions.      STRENGTH: 5 out of 5 deltoid as well as internal and external rotation 5 out of 5 , interosseous and thumb without pain.     SPECIAL TEST: Patient has a negative negative belly press and negative liftoff test, negative empty can and full can test, negative external rotator lag sign, negative drop test   GAIT: non-antalgic  Lymph: no palpable lymph nodes    Diagnostic Modalities:  Recent Results (from the past 744  hour(s))   XR Shoulder Left 2 Views    Narrative    LEFT SHOULDER TWO VIEWS  2/12/2020 10:24 AM     HISTORY: Status post shoulder hemiarthroplasty, left.    COMPARISON: CT left shoulder 2/5/2020.      Impression    IMPRESSION: Interval placement of a left humeral head hemiarthroplasty  with the arthroplasty component in the expected location.  Postoperative soft tissue air is seen. An old healed left clavicular  shaft fracture is noted.    TAMAR GAMBOA MD     We agree with the above reading.    Independent visualization of the images was performed.      Impression: 1.  27 days status post left shoulder hemiarthroplasty with biceps tenodesis (subscap sparing resurfacing) by Dr. Cole    Plan:   This patient is doing excellent at this point.  She can continue to do range of motion and strengthening without restrictions.  She has done 1 formal physical therapy session.  She is educated to work on her range of motion several times a day.  The only motion we would caution her against is aggressive external rotation.  We feel she will have most of her function and strength back when she is 2 to 3 months out from surgery.  She will make gains up to 1 year after surgery.  The plan is to see her back in 6 weeks without an x-ray to check her strength and range of motion and then see her back on an as-needed basis.    Re-x-ray on return: No    BP Readings from Last 1 Encounters:   02/25/20 130/80       BP noted to be well controlled today in office.      Patient does not use Tobacco products.    Scribed by:    Harrison Borges PA-C   2:19 PM  3/10/2020    The information in this document, created by a scribe for me, accurately reflects the services I personally performed and the decisions made by me. I have reviewed and approved this document for accuracy    Nura Cole MD      Again, thank you for allowing me to participate in the care of your patient.        Sincerely,        Nura Cole MD

## 2020-04-02 ENCOUNTER — TELEPHONE (OUTPATIENT)
Dept: PHYSICAL THERAPY | Facility: CLINIC | Age: 65
End: 2020-04-02

## 2020-04-02 DIAGNOSIS — Z47.1 AFTERCARE FOLLOWING SHOULDER JOINT REPLACEMENT SURGERY, UNSPECIFIED LATERALITY: ICD-10-CM

## 2020-04-02 DIAGNOSIS — Z96.619 AFTERCARE FOLLOWING SHOULDER JOINT REPLACEMENT SURGERY, UNSPECIFIED LATERALITY: ICD-10-CM

## 2020-04-02 NOTE — TELEPHONE ENCOUNTER
Spoke with patient regarding covid 19 precautions and best physical therapy treatment options.  Despite recent shoulder surgery, the patient does not want to be seen in clinic due to risk of pandemic.  She is open to video virtual visit and does have a laptop, although questions how up to date it is.      The patient reports she would like to check with her insurance company and then the patient will call the [facility] to schedule a virtual visit if so chooses.    The patient reports she does not need a return call/check in at this time. She states she is doing well since the surgery and is very pleased with first therapy visit and her therapist, Asim Kothari.  Patient just wonders if she is behind protocol and how she will progress.    Niurka Ni, RONYT

## 2020-04-14 ENCOUNTER — MYC MEDICAL ADVICE (OUTPATIENT)
Dept: FAMILY MEDICINE | Facility: OTHER | Age: 65
End: 2020-04-14

## 2020-05-19 ENCOUNTER — MYC REFILL (OUTPATIENT)
Dept: FAMILY MEDICINE | Facility: OTHER | Age: 65
End: 2020-05-19

## 2020-05-19 DIAGNOSIS — I10 HYPERTENSION GOAL BP (BLOOD PRESSURE) < 140/90: ICD-10-CM

## 2020-05-19 DIAGNOSIS — N95.1 MENOPAUSAL SYNDROME (HOT FLASHES): ICD-10-CM

## 2020-05-19 DIAGNOSIS — M19.012 PRIMARY OSTEOARTHRITIS OF LEFT SHOULDER: ICD-10-CM

## 2020-05-19 RX ORDER — POTASSIUM CHLORIDE 1500 MG/1
20 TABLET, EXTENDED RELEASE ORAL 3 TIMES DAILY
Qty: 270 TABLET | Refills: 2 | Status: SHIPPED | OUTPATIENT
Start: 2020-05-19 | End: 2020-06-11

## 2020-05-19 RX ORDER — ATENOLOL AND CHLORTHALIDONE TABLET 50; 25 MG/1; MG/1
1 TABLET ORAL DAILY
Qty: 90 TABLET | Refills: 0 | Status: CANCELLED | OUTPATIENT
Start: 2020-05-19

## 2020-05-19 RX ORDER — ESTRADIOL 1 MG/1
1 TABLET ORAL DAILY
Qty: 90 TABLET | Refills: 0 | Status: CANCELLED | OUTPATIENT
Start: 2020-05-19

## 2020-05-19 RX ORDER — ATENOLOL AND CHLORTHALIDONE TABLET 50; 25 MG/1; MG/1
TABLET ORAL
Qty: 90 TABLET | Refills: 3 | Status: SHIPPED | OUTPATIENT
Start: 2020-05-19 | End: 2021-02-19

## 2020-05-19 RX ORDER — POTASSIUM CHLORIDE 1500 MG/1
20 TABLET, EXTENDED RELEASE ORAL 3 TIMES DAILY
Qty: 270 TABLET | Refills: 0 | Status: CANCELLED | OUTPATIENT
Start: 2020-05-19

## 2020-05-19 RX ORDER — MELOXICAM 15 MG/1
TABLET ORAL
Qty: 90 TABLET | Refills: 3 | Status: SHIPPED | OUTPATIENT
Start: 2020-05-19 | End: 2021-02-19

## 2020-05-19 RX ORDER — ESTRADIOL 1 MG/1
TABLET ORAL
Qty: 90 TABLET | Refills: 2 | Status: SHIPPED | OUTPATIENT
Start: 2020-05-19 | End: 2021-02-19

## 2020-05-19 RX ORDER — MELOXICAM 15 MG/1
15 TABLET ORAL DAILY PRN
Qty: 90 TABLET | Refills: 0 | Status: CANCELLED | OUTPATIENT
Start: 2020-05-19

## 2020-05-19 NOTE — TELEPHONE ENCOUNTER
Routing refill request to provider for review/approval because:  Labs (Mobic)  Reportedly taking BP med differently than prescribed.     Nathalia Dailey, RN, BSN

## 2020-05-27 RX ORDER — POTASSIUM CHLORIDE 1500 MG/1
TABLET, EXTENDED RELEASE ORAL
Qty: 270 TABLET | Refills: 3 | OUTPATIENT
Start: 2020-05-27

## 2020-06-03 ENCOUNTER — MYC REFILL (OUTPATIENT)
Dept: FAMILY MEDICINE | Facility: OTHER | Age: 65
End: 2020-06-03

## 2020-06-03 DIAGNOSIS — I10 HYPERTENSION GOAL BP (BLOOD PRESSURE) < 140/90: ICD-10-CM

## 2020-06-03 RX ORDER — POTASSIUM CHLORIDE 1500 MG/1
TABLET, EXTENDED RELEASE ORAL
Qty: 270 TABLET | Refills: 3 | OUTPATIENT
Start: 2020-06-03

## 2020-06-03 RX ORDER — POTASSIUM CHLORIDE 1500 MG/1
20 TABLET, EXTENDED RELEASE ORAL 3 TIMES DAILY
Qty: 270 TABLET | Refills: 2 | Status: CANCELLED | OUTPATIENT
Start: 2020-06-03

## 2020-06-03 NOTE — TELEPHONE ENCOUNTER
Sent 5/19/20 with 9 month supply. Refill not appropriate at this time.     Nathalia Dailey, RN, BSN

## 2020-06-09 NOTE — PROGRESS NOTES
Patient seen for one time evaluation and treatment.  Patient did not return for further treatment and current status is unknown.  Please see initial evaluation for further information.    Howie Pressley,PT, DPT, OCS

## 2020-06-11 DIAGNOSIS — I10 HYPERTENSION GOAL BP (BLOOD PRESSURE) < 140/90: ICD-10-CM

## 2020-06-11 RX ORDER — POTASSIUM CHLORIDE 1500 MG/1
20 TABLET, EXTENDED RELEASE ORAL 3 TIMES DAILY
Qty: 270 TABLET | Refills: 2 | Status: SHIPPED | OUTPATIENT
Start: 2020-06-11 | End: 2021-02-19

## 2020-06-11 NOTE — TELEPHONE ENCOUNTER
Reason for Call:  Medication or medication refill:    Do you use a [facility]?  Name of the pharmacy and phone number for the current request:  [facility]    Name of the medication requested: Potatssium    Other request: Patient is calling in today in regards to a medication that she needs refilled for [facility]. [facility] is saying that they didn't receive the renewal of the potassium and that it needs to be resent. Thank you    Can we leave a detailed message on this number? YES    Phone number patient can be reached at: Home number on file [phone] (home)    Best Time: anytime    Call taken on 6/11/2020 at 9:41 AM by Leana Mendoza

## 2020-08-28 ENCOUNTER — E-VISIT (OUTPATIENT)
Dept: FAMILY MEDICINE | Facility: OTHER | Age: 65
End: 2020-08-28
Payer: COMMERCIAL

## 2020-08-28 DIAGNOSIS — R30.0 DIFFICULT OR PAINFUL URINATION: Primary | ICD-10-CM

## 2020-08-28 PROCEDURE — 99421 OL DIG E/M SVC 5-10 MIN: CPT | Performed by: FAMILY MEDICINE

## 2020-08-28 RX ORDER — SULFAMETHOXAZOLE/TRIMETHOPRIM 800-160 MG
1 TABLET ORAL 2 TIMES DAILY
Qty: 6 TABLET | Refills: 0 | Status: SHIPPED | OUTPATIENT
Start: 2020-08-28 | End: 2020-08-31

## 2020-08-28 NOTE — TELEPHONE ENCOUNTER
Spoke to pt. She stated she is unable to get to a [facility] lab today to leave a urine sample.  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  Provider, are you willing to prescribe a medication without a urine analysis?

## 2020-12-06 ENCOUNTER — HEALTH MAINTENANCE LETTER (OUTPATIENT)
Age: 65
End: 2020-12-06

## 2020-12-12 ENCOUNTER — MYC MEDICAL ADVICE (OUTPATIENT)
Dept: FAMILY MEDICINE | Facility: OTHER | Age: 65
End: 2020-12-12

## 2020-12-12 ENCOUNTER — E-VISIT (OUTPATIENT)
Dept: URGENT CARE | Facility: URGENT CARE | Age: 65
End: 2020-12-12
Payer: COMMERCIAL

## 2020-12-12 DIAGNOSIS — Z20.822 SUSPECTED COVID-19 VIRUS INFECTION: ICD-10-CM

## 2020-12-12 DIAGNOSIS — J02.9 SORE THROAT: ICD-10-CM

## 2020-12-12 PROCEDURE — 99421 OL DIG E/M SVC 5-10 MIN: CPT | Performed by: PHYSICIAN ASSISTANT

## 2020-12-12 NOTE — PATIENT INSTRUCTIONS
Dear Christiana Lopes,    Your symptoms show that you may have coronavirus (COVID-19). This illness can cause fever, cough and trouble breathing. Many people get a mild case and get better on their own. Some people can get very sick.    Because you also reported sore throat I would like to also test you for Strep Throat to determine if we need to treat you for that as well.    What should I do?  We would like to test you for Covid-19 virus and Strep Throat. I have placed orders for these tests.     For all employees or close contacts (except [facility] and [facility] - see below), go to your [employer] home page and scroll down to the section that says  You have an appointment that needs to be scheduled  and click the large green button that says  Schedule Now  and follow the steps to find the next available opening. PLEASE Mention when asked what you are scheduling for that you need BOTH Covid and Strep tests.   If you are unable to complete these steps or if you cannot find any available times, please call [phone] to schedule employee testing.       [facility] employees or close contacts, please call [phone].   [facility]) employees or close contacts call [phone].      When it's time for your COVID and Strep test:  Stay at least 6 feet away from others. (If someone will drive you to your test, stay in the backseat, as far away from the  as you can.)  Cover your mouth and nose with a mask, tissue or washcloth.  Go straight to the testing site. Don't make any stops on the way there or back.    Starting now:     Do not go to work.   o If you receive a negative COVID-19 test result and were NOT exposed to someone with a known positive COVID-19 test, you can return to work once you're free of fever for 24 hours without fever-reducing medication and your symptoms are improving or resolved.  o If you receive a positive COVID-19 test result, you must be cleared by Employee Occupational Health and  "Safety to return to work.   o If you were exposed to someone who has tested positive for COVID-19, you can return to work 14 days after your last contact with the positive individual, provided you do not have symptoms at all during that time. In some cases, your manager may ask you to come back sooner than 14 days.     During this time, don't leave the house except for testing or medical care.  o Stay in your own room, even for meals. Use your own bathroom if you can.  o Stay away from others in your home. No hugging, kissing or shaking hands. No visitors.  o Don't go to work, school or anywhere else.    Clean \"high touch\" surfaces often (doorknobs, counters, handles, etc.). Use a household cleaning spray or wipes. You'll find a full list of  on the EPA website: www.epa.gov/pesticide-registration/list-n-disinfectants-use-against-sars-cov-2.    Cover your mouth and nose with a mask, tissue or washcloth to avoid spreading germs.    Wash your hands and face often. Use soap and water.    People in these groups are at risk for severe illness due to COVID-19:  o People 65 years and older  o People who live in a nursing home or long-term care facility  o People with chronic disease (lung, heart, cancer, diabetes, kidney, liver, immunologic)  o People who have a weakened immune system, including those who:  - Are in cancer treatment  - Take medicine that weakens the immune system, such as corticosteroids  - Had a bone marrow or organ transplant  - Have an immune deficiency  - Have poorly controlled HIV or AIDS  - Are obese (body mass index of 40 or higher)  - Smoke regularly      Caregivers should wear gloves while washing dishes, handling laundry and cleaning bedrooms and bathrooms.    Use caution when washing and drying laundry: Don't shake dirty laundry, and use the warmest water setting that you can.    For more tips, go to www.cdc.gov/coronavirus/2019-ncov/downloads/10Things.pdf.    Sign up for [employer]in [employer]. We " know it's scary to hear that you might have COVID-19. We want to track your symptoms to make sure you're okay over the next 2 weeks. Please look for an email from [employer]--this is a free, online program that we'll use to keep in touch. To sign up, follow the link in the email you will receive. Learn more at [email or web address]    How can I take care of myself?    Get lots of rest. Drink extra fluids (unless a doctor has told you not to)    Take Tylenol (acetaminophen) for fever or pain. If you have liver or kidney problems, ask your family doctor if it's okay to take Tylenol.  Adults can take either:    650 mg (two 325 mg pills) every 4 to 6 hours, or     1,000 mg (two 500 mg pills) every 8 hours as needed.    Note: Don't take more than 3,000 mg in one day. Acetaminophen is found in many medicines (both prescribed and over-the-counter medicines). Read all labels to be sure you don't take too much.  For children, check the Tylenol bottle for the right dose. The dose is based on the child's age or weight.    If you have other health problems (like cancer, heart failure, an organ transplant or severe kidney disease): Call your specialty clinic if you don't feel better in the next 2 days.    Know when to call 911. Emergency warning signs include:  Trouble breathing or shortness of breath  Pain or pressure in the chest that doesn't go away  Feeling confused like you haven't felt before, or not being able to wake up  Bluish-colored lips or face  Where can I get more information?    [facility] - About COVID-19: [email or web address]    CDC - What to Do If You're Sick: www.cdc.gov/coronavirus/2019-ncov/about/steps-when-sick.html      Dear Christiana Lopes,    Your symptoms show that you may have coronavirus (COVID-19). This illness can cause fever, cough and trouble breathing. Many people get a mild case and get better on their own. Some people can get very sick.    Because you also  reported sore throat I would like to also test you for Strep Throat to determine if we need to treat you for that as well.    What should I do?  We would like to test you for Covid-19 virus and Strep Throat. I have placed orders for these tests.   To schedule: go to your [employer] home page and scroll down to the section that says  You have an appointment that needs to be scheduled  and click the large green button that says  Schedule Now  and follow the steps to find the next available openings.  It is important that when you are asked what the reason for your appointment is that you mention you need BOTH Covid and Strep tests.     If you are unable to complete these [employer] scheduling steps, please call [phone] to schedule your testing.     When it's time for your COVID and Strep test:  Stay at least 6 feet away from others. (If someone will drive you to your test, stay in the backseat, as far away from the  as you can.)  Cover your mouth and nose with a mask, tissue or washcloth.  Go straight to the testing site. Don't make any stops on the way there or back.    Starting now:     Do not go to work. Follow your usual processes for taking time away from work.   o If you receive a negative COVID-19 test result and were NOT exposed to someone with a known positive COVID-19 test, you can return to work once you're free of fever for 24 hours without fever-reducing medication and your symptoms are improving or resolved.  o If you receive a positive COVID-19 test result, return to work should be at least 10 days from symptom onset (20 days if people have immune compromise) and people should be fever-free for 24 hours without medications, and the respiratory symptoms should be improved significantly before returning to work or school  o If you were exposed to someone who has tested positive for COVID-19, you can return to work 14 days after your last contact with the positive individual, provided you do not have  "symptoms at all during that time.    During this time, don't leave the house except for testing or medical care.  o Stay in your own room, even for meals. Use your own bathroom if you can.  o Stay away from others in your home. No hugging, kissing or shaking hands. No visitors.  o Don't go to work, school or anywhere else.    Clean \"high touch\" surfaces often (doorknobs, counters, handles, etc.). Use a household cleaning spray or wipes. You'll find a full list of  on the EPA website: www.epa.gov/pesticide-registration/list-n-disinfectants-use-against-sars-cov-2.    Cover your mouth and nose with a mask, tissue or washcloth to avoid spreading germs.    Wash your hands and face often. Use soap and water.    People in these groups are at risk for severe illness due to COVID-19:  o People 65 years and older  o People who live in a nursing home or long-term care facility  o People with chronic disease (lung, heart, cancer, diabetes, kidney, liver, immunologic)  o People who have a weakened immune system, including those who:  - Are in cancer treatment  - Take medicine that weakens the immune system, such as corticosteroids  - Had a bone marrow or organ transplant  - Have an immune deficiency  - Have poorly controlled HIV or AIDS  - Are obese (body mass index of 40 or higher)  - Smoke regularly      Caregivers should wear gloves while washing dishes, handling laundry and cleaning bedrooms and bathrooms.    Use caution when washing and drying laundry: Don't shake dirty laundry, and use the warmest water setting that you can.    For more tips, go to www.cdc.gov/coronavirus/2019-ncov/downloads/10Things.pdf.    Sign up for [employer]. We know it's scary to hear that you might have COVID-19. We want to track your symptoms to make sure you're okay over the next 2 weeks. Please look for an email from [employer]--this is a free, online program that we'll use to keep in touch. To sign up, follow the link in the email " you will receive. Learn more at [email or web address]    How can I take care of myself?    Get lots of rest. Drink extra fluids (unless a doctor has told you not to)    Take Tylenol (acetaminophen) for fever or pain. If you have liver or kidney problems, ask your family doctor if it's okay to take Tylenol.  Adults can take either:    650 mg (two 325 mg pills) every 4 to 6 hours, or     1,000 mg (two 500 mg pills) every 8 hours as needed.    Note: Don't take more than 3,000 mg in one day. Acetaminophen is found in many medicines (both prescribed and over-the-counter medicines). Read all labels to be sure you don't take too much.  For children, check the Tylenol bottle for the right dose. The dose is based on the child's age or weight.    If you have other health problems (like cancer, heart failure, an organ transplant or severe kidney disease): Call your specialty clinic if you don't feel better in the next 2 days.    Know when to call 911. Emergency warning signs include:  Trouble breathing or shortness of breath  Pain or pressure in the chest that doesn't go away  Feeling confused like you haven't felt before, or not being able to wake up  Bluish-colored lips or face  Where can I get more information?    [facility] - About COVID-19: [email or web address]  CDC - What to Do If You're Sick: www.cdc.gov/coronavirus/2019-ncov/about/steps-when-sick.html

## 2020-12-15 ENCOUNTER — MYC MEDICAL ADVICE (OUTPATIENT)
Dept: FAMILY MEDICINE | Facility: OTHER | Age: 65
End: 2020-12-15

## 2021-01-20 ENCOUNTER — MYC MEDICAL ADVICE (OUTPATIENT)
Dept: FAMILY MEDICINE | Facility: OTHER | Age: 66
End: 2021-01-20

## 2021-02-04 ENCOUNTER — MYC MEDICAL ADVICE (OUTPATIENT)
Dept: FAMILY MEDICINE | Facility: OTHER | Age: 66
End: 2021-02-04

## 2021-02-15 NOTE — PATIENT INSTRUCTIONS
Preventive Health Recommendations    See your health care provider every year to    Review health changes.     Discuss preventive care.      Review your medicines if your doctor has prescribed any.      You no longer need a yearly Pap test unless you've had an abnormal Pap test in the past 10 years. If you have vaginal symptoms, such as bleeding or discharge, be sure to talk with your provider about a Pap test.      Every 1 to 2 years, have a mammogram.  If you are over 69, talk with your health care provider about whether or not you want to continue having screening mammograms.      Every 10 years, have a colonoscopy. Or, have a yearly FIT test (stool test). These exams will check for colon cancer.       Have a cholesterol test every 5 years, or more often if your doctor advises it.       Have a diabetes test (fasting glucose) every three years. If you are at risk for diabetes, you should have this test more often.       At age 65, have a bone density scan (DEXA) to check for osteoporosis (brittle bone disease).    Shots:    Get a flu shot each year.    Get a tetanus shot every 10 years.    Talk to your doctor about your pneumonia vaccines. There are now two you should receive - Pneumovax (PPSV 23) and Prevnar (PCV 13).    Talk to your pharmacist about the shingles vaccine.    Talk to your doctor about the hepatitis B vaccine.    Nutrition:     Eat at least 5 servings of fruits and vegetables each day.      Eat whole-grain bread, whole-wheat pasta and brown rice instead of white grains and rice.      Get adequate about Calcium and Vitamin D.     Lifestyle    Exercise at least 150 minutes a week (30 minutes a day, 5 days a week). This will help you control your weight and prevent disease.      Limit alcohol to one drink per day.      No smoking.       Wear sunscreen to prevent skin cancer.       See your dentist twice a year for an exam and cleaning.      See your eye doctor every 1 to 2 years to screen for  conditions such as glaucoma, macular degeneration, cataracts, etc.    Personalized Prevention Plan  You are due for the preventive services outlined below.  Your care team is available to assist you in scheduling these services.  If you have already completed any of these items, please share that information with your care team to update in your medical record.    Health Maintenance Due   Topic Date Due     Annual Wellness Visit  03/01/2020     FALL RISK ASSESSMENT  03/01/2020     PHQ-2  01/01/2021     Basic Metabolic Panel  02/04/2021     Mammogram  02/21/2021     Patient Education   Personalized Prevention Plan  You are due for the preventive services outlined below.  Your care team is available to assist you in scheduling these services.  If you have already completed any of these items, please share that information with your care team to update in your medical record.  Health Maintenance Due   Topic Date Due     FALL RISK ASSESSMENT  03/01/2020     Basic Metabolic Panel  02/04/2021     Mammogram  02/21/2021       Understanding USDA MyPlate  The USDA has guidelines to help you make healthy food choices. These are called MyPlate. MyPlate shows the food groups that make up healthy meals using the image of a place setting. Before you eat, think about the healthiest choices for what to put on your plate or in your cup or bowl. To learn more about building a healthy plate, visit www.choosemyplate.gov.     The food groups    Fruits. Any fruit or 100% fruit juice counts as part of the Fruit Group. Fruits may be fresh, canned, frozen, or dried, and may be whole, cut-up, or pureed. Make 1/2 of your plate fruits and vegetables.    Vegetables. Any vegetable or 100% vegetable juice counts as a member of the Vegetable Group. Vegetables may be fresh, frozen, canned, or dried. They can be served raw or cooked and may be whole, cut-up, or mashed. Make 1/2 of your plate fruits and vegetables.    Grains. All foods made from  grains are part of the Grains Group. These include wheat, rice, oats, cornmeal, and barley. Grains are often used to make foods such as bread, pasta, oatmeal, cereal, tortillas, and grits. Grains should be no more than 1/4 of your plate. At least half of your grains should be whole grains.    Protein. This group includes meat, poultry, seafood, beans and peas, eggs, processed soy products (such as tofu), nuts (including nut butters), and seeds. Make protein choices no more than 1/4 of your plate. Meat and poultry choices should be lean or low fat.    Dairy. The Dairy Group includes all fluid milk products and foods made from milk that contain calcium, such as yogurt and cheese. (Foods that have little calcium, such as cream, butter, and cream cheese, are not part of this group.) Most dairy choices should be low-fat or fat-free.    Oils. Oils aren't a food group, but they do contain essential nutrients. However it's important to watch your intake of oils. These are fats that are liquid at room temperature. They include canola, corn, olive, soybean, vegetable, and sunflower oil. Foods that are mainly oil include mayonnaise, certain salad dressings, and soft margarines. You likely already get your daily oil allowance from the foods you eat.  Things to limit  Eating healthy also means limiting these things in your diet:    Salt (sodium). Many processed foods have a lot of sodium. To keep sodium intake down, eat fresh vegetables, meats, poultry, and seafood when possible. Purchase low-sodium, reduced-sodium, or no-salt-added food products at the store. And don't add salt to your meals at home. Instead, season them with herbs and spices such as dill, oregano, cumin, and paprika. Or try adding flavor with lemon or lime zest and juice.    Saturated fat. Saturated fats are most often found in animal products such as beef, pork, and chicken. They are often solid at room temperature, such as butter. To reduce your saturated  fat intake, choose leaner cuts of meat and poultry. And try healthier cooking methods such as grilling, broiling, roasting, or baking. For a simple lower-fat swap, use plain nonfat yogurt instead of mayonnaise when making potato salad or macaroni salad.    Added sugars. These are sugars added to foods. They are in foods such as ice cream, candy, soda, fruit drinks, sports drinks, energy drinks, cookies, pastries, jams, and syrups. Cut down on added sugars by sharing sweet treats with a family member or friend. You can also choose fruit for dessert, and drink water or other unsweetened beverages.  [employer] last reviewed this educational content on 6/1/2020 2000-2020 [employer]. [address] [place] [id number]. All rights reserved. This information is not intended as a substitute for professional medical care. Always follow your healthcare professional's instructions.          Urinary Incontinence, Female (Adult)   Urinary incontinence means loss of bladder control. This problem affects many women, especially as they get older. If you have incontinence, you may be embarrassed to ask for help. But know that this problem can be treated.   Types of Incontinence  There are different types of incontinence. Two of the main types are described here. You can have more than one type.     Stress incontinence. With this type, urine leaks when pressure (stress) is put on the bladder. This may happen when you cough, sneeze, or laugh. Stress incontinence most often occurs because the pelvic floor muscles that support the bladder and urethra are weak. This can happen after pregnancy and vaginal childbirth or a hysterectomy. It can also be due to excess body weight or hormone changes.    Urge incontinence (also called overactive bladder). With this type, a sudden urge to urinate is felt often. This may happen even though there may not be much urine in the bladder. The need to urinate often during the night  is common. Urge incontinence most often occurs because of bladder spasms. This may be due to bladder irritation or infection. Damage to bladder nerves or pelvic muscles, constipation, and certain medicines can also lead to urge incontinence.  Treatment depends on the cause. Further evaluation is needed to find the type you have. This will likely include an exam and certain tests. Based on the results, you and your healthcare provider can then plan treatment. Until a diagnosis is made, the home care tips below can help ease symptoms.   Home care    Do pelvic floor muscle exercises, if they are prescribed. The pelvic floor muscles help support the bladder and urethra. Many women find that their symptoms improve when doing special exercises that strengthen these muscles. To do the exercises, contract the muscles you would use to stop your stream of urine. But do this when you re not urinating. Hold for 10 seconds, then relax. Repeat 10 to 20 times in a row, at least 3 times a day. Your healthcare provider may give you other instructions for how to do the exercises and how often.    Keep a bladder diary. This helps track how often and how much you urinate over a set period of time. Bring this diary with you to your next visit with the provider. The information can help your provider learn more about your bladder problem.    Lose weight, if advised to by your provider. Extra weight puts pressure on the bladder. Your provider can help you create a weight-loss plan that s right for you. This may include exercising more and making certain diet changes.    Don't have foods and drinks that may irritate the bladder. These can include alcohol and caffeinated drinks.    Quit smoking. Smoking and other tobacco use can lead to a long-term (chronic) cough that strains the pelvic floor muscles. Smoking may also damage the bladder and urethra. Talk with your provider about treatments or methods you can use to quit smoking.    If  drinking large amounts of fluid makes you have symptoms, you may be advised to limit your fluid intake. You may also be advised to drink most of your fluids during the day and to limit fluids at night.    If you re worried about urine leakage or accidents, you may wear absorbent pads to catch urine. Change the pads often. This helps reduce discomfort. It may also reduce the risk of skin or bladder infections.    Follow-up care  Follow up with your healthcare provider, or as directed. It may take some to find the right treatment for your problem. But healthy lifestyle changes can be made right away. These include such things as exercising on a regular basis, eating a healthy diet, losing weight (if needed), and quitting smoking. Your treatment plan may include special therapies or medicines. Certain procedures or surgery may also be options. Talk about any questions you have with your provider.   When to seek medical advice  Call the healthcare provider right away if any of these occur:    Fever of 100.4 F (38 C) or higher, or as directed by your provider    Bladder pain or fullness    Belly swelling    Nausea or vomiting    Back pain    Weakness, dizziness, or fainting  [employer] last reviewed this educational content on 1/1/2020 2000-2020 [employer]. [address] [place] [address], [place] [address]. All rights reserved. This information is not intended as a substitute for professional medical care. Always follow your healthcare professional's instructions.

## 2021-02-15 NOTE — PROGRESS NOTES
"SUBJECTIVE:   Christiana Lopes is a 65 year old female who presents for Preventive Visit.    Patient has been advised of split billing requirements and indicates understanding: Yes   Are you in the first 12 months of your Medicare coverage?  No    Healthy Habits:     In general, how would you rate your overall health?  Good    Frequency of exercise:  2-3 days/week    Duration of exercise:  Greater than 60 minutes    Do you usually eat at least 4 servings of fruit and vegetables a day, include whole grains    & fiber and avoid regularly eating high fat or \"junk\" foods?  No    Taking medications regularly:  Yes    Medication side effects:  None    Ability to successfully perform activities of daily living:  No assistance needed    Home Safety:  No safety concerns identified    Hearing Impairment:  No hearing concerns    In the past 6 months, have you been bothered by leaking of urine? Yes    In general, how would you rate your overall mental or emotional health?  Good      PHQ-2 Total Score: 0    Additional concerns today:  No     Patient would like to talk about her chronic conditions and refills.    Do you feel safe in your environment? No    Have you ever done Advance Care Planning? (For example, a Health Directive, POLST, or a discussion with a medical provider or your loved ones about your wishes): No, advance care planning information given to patient to review.  Patient plans to discuss their wishes with loved ones or provider.         Fall risk  Fallen 2 or more times in the past year?: No  Any fall with injury in the past year?: No    Cognitive Screening   1) Repeat 3 items (Leader, Season, Table)    2) Clock draw: NORMAL  3) 3 item recall: Recalls 3 objects  Results: 3 items recalled: COGNITIVE IMPAIRMENT LESS LIKELY    Mini-CogTM Copyright RHYS Romo. Licensed by the author for use in [facility]; reprinted with permission (rufino@.[email or web address]). All rights reserved.      Do you have sleep apnea, " excessive snoring or daytime drowsiness?: no    Reviewed and updated as needed this visit by clinical staff  Tobacco  Allergies  Meds  Problems  Med Hx  Surg Hx  Fam Hx  Soc Hx          Reviewed and updated as needed this visit by Provider  Tobacco  Allergies  Meds  Problems  Med Hx  Surg Hx  Fam Hx         Social History     Tobacco Use     Smoking status: Former Smoker     Packs/day: 1.00     Years: 2.00     Pack years: 2.00     Types: Cigarettes     Quit date: 8/1/2010     Years since quitting: 10.5     Smokeless tobacco: Never Used     Tobacco comment: no use of tobacco products   Substance Use Topics     Alcohol use: Yes     Comment: socially     If you drink alcohol do you typically have >3 drinks per day or >7 drinks per week? No    Alcohol Use 2/19/2021   Prescreen: >3 drinks/day or >7 drinks/week? No   Prescreen: >3 drinks/day or >7 drinks/week? -     Current providers sharing in care for this patient include:   Patient Care Team:  iJgna Lees MD as PCP - General (Family Practice)  Jigna Lees MD as Assigned PCP  Nura Cole MD as Assigned Musculoskeletal Provider    The following health maintenance items are reviewed in Epic and correct as of today:  Health Maintenance   Topic Date Due     FALL RISK ASSESSMENT  03/01/2020     BMP  02/04/2021     MAMMO SCREENING  02/21/2021     MEDICARE ANNUAL WELLNESS VISIT  02/19/2022     LIPID  01/12/2023     ADVANCE CARE PLANNING  01/12/2023     DTAP/TDAP/TD IMMUNIZATION (3 - Td) 07/08/2024     COLORECTAL CANCER SCREENING  02/12/2028     DEXA  03/13/2034     HEPATITIS C SCREENING  Completed     PHQ-2  Completed     INFLUENZA VACCINE  Completed     Pneumococcal Vaccine: 65+ Years  Completed     ZOSTER IMMUNIZATION  Completed     Pneumococcal Vaccine: Pediatrics (0 to 5 Years) and At-Risk Patients (6 to 64 Years)  Aged Out     IPV IMMUNIZATION  Aged Out     MENINGITIS IMMUNIZATION  Aged Out     HEPATITIS B IMMUNIZATION  Aged  "Out       Review of Systems   Constitutional: Negative for chills and fever.   HENT: Negative for congestion, ear pain, hearing loss and sore throat.    Eyes: Negative for pain and visual disturbance.   Respiratory: Negative for cough and shortness of breath.    Cardiovascular: Negative for chest pain, palpitations and peripheral edema.   Gastrointestinal: Positive for diarrhea (priobiotics helpful.  Certain foods can set it off.). Negative for abdominal pain, constipation, heartburn, hematochezia and nausea.   Breasts:  Negative for tenderness, breast mass and discharge.   Genitourinary: Positive for urgency. Negative for dysuria, frequency, genital sores, hematuria, pelvic pain, vaginal bleeding and vaginal discharge.   Musculoskeletal: Positive for arthralgias, joint swelling and myalgias.   Skin: Negative for rash.   Neurological: Negative for dizziness, weakness, headaches and paresthesias.   Psychiatric/Behavioral: Negative for mood changes. The patient is not nervous/anxious.        OBJECTIVE:   /84   Pulse 51   Temp 98.1  F (36.7  C) (Temporal)   Ht 1.555 m (5' 1.22\")   Wt 62.6 kg (138 lb)   SpO2 99%   BMI 25.89 kg/m   Estimated body mass index is 25.89 kg/m  as calculated from the following:    Height as of this encounter: 1.555 m (5' 1.22\").    Weight as of this encounter: 62.6 kg (138 lb).  Physical Exam  Constitutional:       General: She is not in acute distress.     Appearance: She is well-developed.   HENT:      Right Ear: Tympanic membrane and external ear normal.      Left Ear: Tympanic membrane and external ear normal.      Nose: Nose normal.      Mouth/Throat:      Pharynx: No oropharyngeal exudate.   Eyes:      General:         Right eye: No discharge.         Left eye: No discharge.      Conjunctiva/sclera: Conjunctivae normal.      Pupils: Pupils are equal, round, and reactive to light.   Neck:      Musculoskeletal: Neck supple.      Thyroid: No thyromegaly.      Trachea: No " tracheal deviation.   Cardiovascular:      Rate and Rhythm: Normal rate and regular rhythm.      Pulses: Normal pulses.      Heart sounds: Normal heart sounds, S1 normal and S2 normal. No murmur. No friction rub. No S3 or S4 sounds.    Pulmonary:      Effort: Pulmonary effort is normal. No respiratory distress.      Breath sounds: Normal breath sounds. No wheezing or rales.   Abdominal:      General: Bowel sounds are normal.      Palpations: Abdomen is soft. There is no mass.      Tenderness: There is no abdominal tenderness.   Musculoskeletal: Normal range of motion.   Lymphadenopathy:      Cervical: No cervical adenopathy.   Skin:     General: Skin is warm and dry.      Findings: No rash.   Neurological:      Mental Status: She is alert and oriented to person, place, and time.      Motor: No abnormal muscle tone.      Deep Tendon Reflexes: Reflexes are normal and symmetric.   Psychiatric:         Thought Content: Thought content normal.         Judgment: Judgment normal.             ASSESSMENT / PLAN:   1. Encounter for Medicare annual wellness exam    2. Hypertension goal BP (blood pressure) < 140/90  Well-controlled.  Labs drawn.  Refills given.    - BASIC METABOLIC PANEL  - atenolol-chlorthalidone (TENORETIC) 50-25 MG tablet; Take 1 tablet by mouth daily  Dispense: 90 tablet; Refill: 3  - potassium chloride ER (K-TAB) 20 MEQ CR tablet; Take 1 tablet (20 mEq) by mouth 3 times daily  Dispense: 270 tablet; Refill: 3    3. Menopausal syndrome (hot flashes)  Patient has been on this for about 3 years.  She still has some mild hot flashes.  She is unsure if she wants to consider weaning.  Discussed that if she wanted to try weaning would go every other day for at least 3 months before weaning down further.    - estradiol (ESTRACE) 1 MG tablet; Take 1 tablet (1 mg) by mouth daily  Dispense: 90 tablet; Refill: 3    4. Primary osteoarthritis of left shoulder  Had shoulder surgery about a year ago.  She does not feel  "that she has recovered as well as she should have, states certain movements cause pain.  At some point she plans to return to orthopedics to discuss test but at this time she feels the meloxicam daily helps with her symptoms.  - meloxicam (MOBIC) 15 MG tablet; TAKE 1 TABLET DAILY AS NEEDED  Dispense: 90 tablet; Refill: 3    5. Visit for screening mammogram    - MA SCREENING DIGITAL BILAT - Future  (s+30); Future    6. Urinary incontinence, unspecified type  She does note some urinary incontinence and wears a pad.  She is wondering what her options are.  Will refer to urology.    - UROLOGY ADULT REFERRAL; Future    Patient has been advised of split billing requirements and indicates understanding: Yes  COUNSELING:  Reviewed preventive health counseling, as reflected in patient instructions    Estimated body mass index is 25.89 kg/m  as calculated from the following:    Height as of this encounter: 1.555 m (5' 1.22\").    Weight as of this encounter: 62.6 kg (138 lb).        She reports that she quit smoking about 10 years ago. Her smoking use included cigarettes. She has a 2.00 pack-year smoking history. She has never used smokeless tobacco.      Appropriate preventive services were discussed with this patient, including applicable screening as appropriate for cardiovascular disease, diabetes, osteopenia/osteoporosis, and glaucoma.  As appropriate for age/gender, discussed screening for colorectal cancer, prostate cancer, breast cancer, and cervical cancer. Checklist reviewing preventive services available has been given to the patient.    Reviewed patients plan of care and provided an AVS. The Basic Care Plan (routine screening as documented in Health Maintenance) for Christiana meets the Care Plan requirement. This Care Plan has been established and reviewed with the Patient.    Counseling Resources:  ATP IV Guidelines  Pooled Cohorts Equation Calculator  Breast Cancer Risk Calculator  Breast Cancer: Medication to " Reduce Risk  FRAX Risk Assessment  ICSI Preventive Guidelines  Dietary Guidelines for Americans, 2010  [employer] MyPlate  ASA Prophylaxis  Lung CA Screening    Jigna Lees MD  [facility]    Identified Health Risks:    The patient was counseled and encouraged to consider modifying their diet and eating habits. She was provided with information on recommended healthy diet options.  Information on urinary incontinence and treatment options given to patient.

## 2021-02-19 ENCOUNTER — OFFICE VISIT (OUTPATIENT)
Dept: FAMILY MEDICINE | Facility: OTHER | Age: 66
End: 2021-02-19
Payer: COMMERCIAL

## 2021-02-19 VITALS
HEART RATE: 51 BPM | TEMPERATURE: 98.1 F | HEIGHT: 61 IN | BODY MASS INDEX: 26.06 KG/M2 | WEIGHT: 138 LBS | OXYGEN SATURATION: 99 % | SYSTOLIC BLOOD PRESSURE: 132 MMHG | DIASTOLIC BLOOD PRESSURE: 84 MMHG

## 2021-02-19 DIAGNOSIS — M19.012 PRIMARY OSTEOARTHRITIS OF LEFT SHOULDER: ICD-10-CM

## 2021-02-19 DIAGNOSIS — Z12.31 VISIT FOR SCREENING MAMMOGRAM: ICD-10-CM

## 2021-02-19 DIAGNOSIS — Z00.00 ENCOUNTER FOR MEDICARE ANNUAL WELLNESS EXAM: Primary | ICD-10-CM

## 2021-02-19 DIAGNOSIS — N95.1 MENOPAUSAL SYNDROME (HOT FLASHES): ICD-10-CM

## 2021-02-19 DIAGNOSIS — I10 HYPERTENSION GOAL BP (BLOOD PRESSURE) < 140/90: ICD-10-CM

## 2021-02-19 DIAGNOSIS — R32 URINARY INCONTINENCE, UNSPECIFIED TYPE: ICD-10-CM

## 2021-02-19 PROBLEM — J06.9 URI WITH COUGH AND CONGESTION: Status: RESOLVED | Noted: 2019-08-23 | Resolved: 2021-02-19

## 2021-02-19 LAB
ANION GAP SERPL CALCULATED.3IONS-SCNC: 5 MMOL/L (ref 3–14)
BUN SERPL-MCNC: 25 MG/DL (ref 7–30)
CALCIUM SERPL-MCNC: 8.8 MG/DL (ref 8.5–10.1)
CHLORIDE SERPL-SCNC: 106 MMOL/L (ref 94–109)
CO2 SERPL-SCNC: 28 MMOL/L (ref 20–32)
CREAT SERPL-MCNC: 0.78 MG/DL (ref 0.52–1.04)
GFR SERPL CREATININE-BSD FRML MDRD: 79 ML/MIN/{1.73_M2}
GLUCOSE SERPL-MCNC: 77 MG/DL (ref 70–99)
POTASSIUM SERPL-SCNC: 3.9 MMOL/L (ref 3.4–5.3)
SODIUM SERPL-SCNC: 139 MMOL/L (ref 133–144)

## 2021-02-19 PROCEDURE — 36415 COLL VENOUS BLD VENIPUNCTURE: CPT | Performed by: FAMILY MEDICINE

## 2021-02-19 PROCEDURE — 80048 BASIC METABOLIC PNL TOTAL CA: CPT | Performed by: FAMILY MEDICINE

## 2021-02-19 PROCEDURE — 99397 PER PM REEVAL EST PAT 65+ YR: CPT | Performed by: FAMILY MEDICINE

## 2021-02-19 PROCEDURE — 99213 OFFICE O/P EST LOW 20 MIN: CPT | Mod: 25 | Performed by: FAMILY MEDICINE

## 2021-02-19 RX ORDER — POTASSIUM CHLORIDE 1500 MG/1
20 TABLET, EXTENDED RELEASE ORAL 3 TIMES DAILY
Qty: 270 TABLET | Refills: 3 | Status: SHIPPED | OUTPATIENT
Start: 2021-02-19 | End: 2021-03-11

## 2021-02-19 RX ORDER — ATENOLOL AND CHLORTHALIDONE TABLET 50; 25 MG/1; MG/1
1 TABLET ORAL DAILY
Qty: 90 TABLET | Refills: 3 | Status: SHIPPED | OUTPATIENT
Start: 2021-02-19 | End: 2022-02-25

## 2021-02-19 RX ORDER — ESTRADIOL 1 MG/1
1 TABLET ORAL DAILY
Qty: 90 TABLET | Refills: 3 | Status: SHIPPED | OUTPATIENT
Start: 2021-02-19 | End: 2021-05-26

## 2021-02-19 RX ORDER — MELOXICAM 15 MG/1
TABLET ORAL
Qty: 90 TABLET | Refills: 3 | Status: SHIPPED | OUTPATIENT
Start: 2021-02-19 | End: 2021-06-08

## 2021-02-19 ASSESSMENT — ENCOUNTER SYMPTOMS
SHORTNESS OF BREATH: 0
ARTHRALGIAS: 1
MYALGIAS: 1
FEVER: 0
SORE THROAT: 0
ABDOMINAL PAIN: 0
HEMATOCHEZIA: 0
BREAST MASS: 0
WEAKNESS: 0
DYSURIA: 0
PARESTHESIAS: 0
PALPITATIONS: 0
COUGH: 0
CHILLS: 0
NERVOUS/ANXIOUS: 0
NAUSEA: 0
HEARTBURN: 0
DIZZINESS: 0
DIARRHEA: 1
HEMATURIA: 0
JOINT SWELLING: 1
FREQUENCY: 0
EYE PAIN: 0
HEADACHES: 0
CONSTIPATION: 0

## 2021-02-19 ASSESSMENT — MIFFLIN-ST. JEOR: SCORE: 1101.83

## 2021-02-19 ASSESSMENT — ACTIVITIES OF DAILY LIVING (ADL): CURRENT_FUNCTION: NO ASSISTANCE NEEDED

## 2021-03-11 DIAGNOSIS — I10 HYPERTENSION GOAL BP (BLOOD PRESSURE) < 140/90: ICD-10-CM

## 2021-03-11 RX ORDER — POTASSIUM CHLORIDE 1500 MG/1
TABLET, EXTENDED RELEASE ORAL
Qty: 270 TABLET | Refills: 3 | Status: SHIPPED | OUTPATIENT
Start: 2021-03-11 | End: 2022-01-25

## 2021-03-17 ENCOUNTER — MYC MEDICAL ADVICE (OUTPATIENT)
Dept: FAMILY MEDICINE | Facility: OTHER | Age: 66
End: 2021-03-17

## 2021-04-05 ENCOUNTER — OFFICE VISIT (OUTPATIENT)
Dept: OPTOMETRY | Facility: CLINIC | Age: 66
End: 2021-04-05
Payer: COMMERCIAL

## 2021-04-05 DIAGNOSIS — H25.813 MIXED TYPE AGE-RELATED CATARACT, BOTH EYES: ICD-10-CM

## 2021-04-05 DIAGNOSIS — H52.4 PRESBYOPIA: ICD-10-CM

## 2021-04-05 DIAGNOSIS — H52.223 REGULAR ASTIGMATISM OF BOTH EYES: Primary | ICD-10-CM

## 2021-04-05 DIAGNOSIS — H02.831 DERMATOCHALASIS OF BOTH UPPER EYELIDS: ICD-10-CM

## 2021-04-05 DIAGNOSIS — H02.834 DERMATOCHALASIS OF BOTH UPPER EYELIDS: ICD-10-CM

## 2021-04-05 PROCEDURE — 92014 COMPRE OPH EXAM EST PT 1/>: CPT | Performed by: OPTOMETRIST

## 2021-04-05 PROCEDURE — 92015 DETERMINE REFRACTIVE STATE: CPT | Performed by: OPTOMETRIST

## 2021-04-05 ASSESSMENT — VISUAL ACUITY
OS_PH_SC: 20/40
OS_CC: 20/30-1
OD_SC: 20/30
OD_CC: 20/50
CORRECTION_TYPE: GLASSES
METHOD: SNELLEN - LINEAR
OS_PH_SC+: -1
OD_SC+: -1
OS_SC+: -3
OS_SC: 20/50

## 2021-04-05 ASSESSMENT — REFRACTION_MANIFEST
OD_SPHERE: PLANO
OD_AXIS: 172
OS_CYLINDER: +2.00
OD_CYLINDER: +1.75
OD_SPHERE: 0.00
OS_CYLINDER: +2.00
OS_SPHERE: -0.75
OD_AXIS: 175
OD_CYLINDER: +1.75
OS_ADD: +2.00
OS_AXIS: 005
OS_SPHERE: -0.75
OD_ADD: +2.00
OS_AXIS: 005
METHOD_AUTOREFRACTION: 1

## 2021-04-05 ASSESSMENT — KERATOMETRY
OS_K1POWER_DIOPTERS: 45.50
OS_AXISANGLE2_DEGREES: 40
OD_AXISANGLE2_DEGREES: 180
OD_K2POWER_DIOPTERS: 45.00
OD_K1POWER_DIOPTERS: 46.00
OS_K2POWER_DIOPTERS: 46.25

## 2021-04-05 ASSESSMENT — SLIT LAMP EXAM - LIDS
COMMENTS: 2+ DERMATOCHALASIS
COMMENTS: 2+ DERMATOCHALASIS

## 2021-04-05 ASSESSMENT — EXTERNAL EXAM - RIGHT EYE: OD_EXAM: NORMAL

## 2021-04-05 ASSESSMENT — REFRACTION_WEARINGRX
OS_SPHERE: +1.75
SPECS_TYPE: OTC'S
OD_SPHERE: +1.75

## 2021-04-05 ASSESSMENT — CONF VISUAL FIELD
METHOD: COUNTING FINGERS
OD_NORMAL: 1
OS_NORMAL: 1

## 2021-04-05 ASSESSMENT — TONOMETRY
OD_IOP_MMHG: 17
IOP_METHOD: APPLANATION
OS_IOP_MMHG: 17

## 2021-04-05 ASSESSMENT — CUP TO DISC RATIO
OD_RATIO: 0.1
OS_RATIO: 0.1

## 2021-04-05 ASSESSMENT — EXTERNAL EXAM - LEFT EYE: OS_EXAM: NORMAL

## 2021-04-05 NOTE — PATIENT INSTRUCTIONS
Fill glasses prescription   refer to Dr Barfield for eyelid evaluation   Return in 1 year for eye exam    Therese Hancock, OD  [phone]

## 2021-04-05 NOTE — LETTER
4/5/2021         RE: Christiana Lopes  [address]  [place] [address]        Dear Colleague,    Thank you for referring your patient, Christiana Lopes, to the [facility]. Please see a copy of my visit note below.    Chief Complaint   Patient presents with     COMPREHENSIVE EYE EXAM    Patient was scheduled at 3:30 but she switched with her , then she was 1/2 late      Last Eye Exam: 5/1/2018  Dilated Previously: Yes    What are you currently using to see?  Readers, as needed. Has several pairs        Distance Vision Acuity: Noticed gradual change in both eyes, aging     Near Vision Acuity: Satisfied with vision while reading and using computer with readers mostly, tries to go without them     Eye Comfort: good. Interested in a Eyelid Lift.    Do you use eye drops? 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       Some days have to lift brow when driving to see better     OBJECTIVE: See Ophthalmology exam    ASSESSMENT:    ICD-10-CM    1. Regular astigmatism of both eyes  H52.223    2. Presbyopia  H52.4    3. Mixed type age-related cataract, both eyes  H25.813    4. Dermatochalasis of both upper eyelids  H02.831 EYE ADULT REFERRAL    H02.834       PLAN:     Patient Instructions   Fill glasses prescription   refer to Dr Barfield for eyelid evaluation   Return in 1 year for eye exam    Therese Hancock, OD  [phone]                       Again, thank you for allowing me to participate in the care of your patient.        Sincerely,        Therese Hancock, OD

## 2021-04-05 NOTE — PROGRESS NOTES
Chief Complaint   Patient presents with     COMPREHENSIVE EYE EXAM    Patient was scheduled at 3:30 but she switched with her , then she was 1/2 late      Last Eye Exam: 5/1/2018  Dilated Previously: Yes    What are you currently using to see?  Readers, as needed. Has several pairs        Distance Vision Acuity: Noticed gradual change in both eyes, aging     Near Vision Acuity: Satisfied with vision while reading and using computer with readers mostly, tries to go without them     Eye Comfort: good. Interested in a Eyelid Lift.    Do you use eye drops? 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       Some days have to lift brow when driving to see better     OBJECTIVE: See Ophthalmology exam    ASSESSMENT:    ICD-10-CM    1. Regular astigmatism of both eyes  H52.223    2. Presbyopia  H52.4    3. Mixed type age-related cataract, both eyes  H25.813    4. Dermatochalasis of both upper eyelids  H02.831 EYE ADULT REFERRAL    H02.834       PLAN:     Patient Instructions   Fill glasses prescription   refer to Dr Barfield for eyelid evaluation   Return in 1 year for eye exam    Therese Hancock, OD  [phone]

## 2021-04-15 ENCOUNTER — ANCILLARY PROCEDURE (OUTPATIENT)
Dept: MAMMOGRAPHY | Facility: OTHER | Age: 66
End: 2021-04-15
Attending: FAMILY MEDICINE
Payer: COMMERCIAL

## 2021-04-15 DIAGNOSIS — Z12.31 VISIT FOR SCREENING MAMMOGRAM: ICD-10-CM

## 2021-04-15 PROCEDURE — 77063 BREAST TOMOSYNTHESIS BI: CPT | Mod: TC | Performed by: RADIOLOGY

## 2021-04-15 PROCEDURE — 77067 SCR MAMMO BI INCL CAD: CPT | Mod: TC | Performed by: RADIOLOGY

## 2021-05-19 ENCOUNTER — MYC MEDICAL ADVICE (OUTPATIENT)
Dept: FAMILY MEDICINE | Facility: OTHER | Age: 66
End: 2021-05-19

## 2021-05-19 DIAGNOSIS — N95.1 MENOPAUSAL SYNDROME (HOT FLASHES): ICD-10-CM

## 2021-05-19 RX ORDER — ESTRADIOL 1 MG/1
TABLET ORAL
Qty: 90 TABLET | Refills: 3 | OUTPATIENT
Start: 2021-05-19

## 2021-05-19 NOTE — TELEPHONE ENCOUNTER
Prescription was sent 2/19/21 for #90 with 3 refills.  Pharmacy notified via E-Prescribe refusal.     Tiana Douglas RN on 5/19/2021 at 4:05 PM

## 2021-05-20 ENCOUNTER — MYC MEDICAL ADVICE (OUTPATIENT)
Dept: FAMILY MEDICINE | Facility: OTHER | Age: 66
End: 2021-05-20

## 2021-05-20 DIAGNOSIS — N95.1 MENOPAUSAL SYNDROME (HOT FLASHES): ICD-10-CM

## 2021-05-21 RX ORDER — ESTRADIOL 1 MG/1
1 TABLET ORAL DAILY
Qty: 90 TABLET | Refills: 3 | Status: CANCELLED | OUTPATIENT
Start: 2021-05-21

## 2021-05-21 NOTE — TELEPHONE ENCOUNTER
Should have refills. Asked patient to contact pharmacy.     Kyra Barrera RN, BSN  [place]  May 21, 2021

## 2021-05-26 ENCOUNTER — MYC MEDICAL ADVICE (OUTPATIENT)
Dept: FAMILY MEDICINE | Facility: OTHER | Age: 66
End: 2021-05-26

## 2021-05-26 DIAGNOSIS — N95.1 MENOPAUSAL SYNDROME (HOT FLASHES): ICD-10-CM

## 2021-05-26 RX ORDER — ESTRADIOL 1 MG/1
1 TABLET ORAL DAILY
Qty: 30 TABLET | Refills: 0 | Status: SHIPPED | OUTPATIENT
Start: 2021-05-26 | End: 2022-02-25

## 2021-05-26 NOTE — TELEPHONE ENCOUNTER
Short term refill sent to cover until mail order can arrive.    [facility] and they stated the refill is on pended.    Tiana Douglas RN on 5/26/2021 at 11:03 AM

## 2021-06-02 ENCOUNTER — MYC MEDICAL ADVICE (OUTPATIENT)
Dept: FAMILY MEDICINE | Facility: OTHER | Age: 66
End: 2021-06-02

## 2021-06-02 ENCOUNTER — E-VISIT (OUTPATIENT)
Dept: URGENT CARE | Facility: URGENT CARE | Age: 66
End: 2021-06-02
Payer: COMMERCIAL

## 2021-06-02 DIAGNOSIS — N39.0 ACUTE UTI (URINARY TRACT INFECTION): Primary | ICD-10-CM

## 2021-06-02 PROCEDURE — 99421 OL DIG E/M SVC 5-10 MIN: CPT | Performed by: PHYSICIAN ASSISTANT

## 2021-06-02 RX ORDER — NITROFURANTOIN 25; 75 MG/1; MG/1
100 CAPSULE ORAL 2 TIMES DAILY
Qty: 10 CAPSULE | Refills: 0 | Status: SHIPPED | OUTPATIENT
Start: 2021-06-02 | End: 2021-06-07

## 2021-06-02 NOTE — TELEPHONE ENCOUNTER
Patient offered virtual appointment.  She is unable to talk long due to distance learning.    She will try an evisit.  Tiana Douglas RN on 6/2/2021 at 9:49 AM

## 2021-06-02 NOTE — PATIENT INSTRUCTIONS
Dear Christiana Lopes    After reviewing your responses, I've been able to diagnose you with a urinary tract infection, which is a common infection of the bladder with bacteria.  This is not a sexually transmitted infection, though urinating immediately after intercourse can help prevent infections.  Drinking lots of fluids is also helpful to clear your current infection and prevent the next one.      I have sent a prescription for antibiotics to your pharmacy to treat this infection.    It is important that you take all of your prescribed medication even if your symptoms are improving after a few doses.  Taking all of your medicine helps prevent the symptoms from returning.     If your symptoms worsen, you develop pain in your back or stomach, develop fevers, or are not improving in 5 days, please contact your primary care provider for an appointment or visit any of our convenient Walk-in or Urgent Care Centers to be seen, which can be found on our website here.    Thanks again for choosing us as your health care partner,    Priyank Santana PA-C

## 2021-06-08 DIAGNOSIS — M19.012 PRIMARY OSTEOARTHRITIS OF LEFT SHOULDER: ICD-10-CM

## 2021-06-08 RX ORDER — MELOXICAM 15 MG/1
TABLET ORAL
Qty: 90 TABLET | Refills: 2 | Status: SHIPPED | OUTPATIENT
Start: 2021-06-08 | End: 2022-02-25

## 2021-08-20 ENCOUNTER — NURSE TRIAGE (OUTPATIENT)
Dept: FAMILY MEDICINE | Facility: OTHER | Age: 66
End: 2021-08-20

## 2021-08-20 NOTE — TELEPHONE ENCOUNTER
BACKGROUND:   Patient was outside trimming bushes.     SITUATION:   She caught the top of her pointer finger.     HOME TREATMENTS:  Got the bleeding to stop. Wrapped it.     PLAN:   Go to .          ROBB Latif, RN, PHN  [place]  August 20, 2021    Additional Information    Negative: [1] Major bleeding (e.g., actively dripping or spurting) AND [2] can't be stopped    Negative: Amputation    Negative: Shock suspected (e.g., cold/pale/clammy skin, too weak to stand, low BP, rapid pulse)    Negative: [1] Knife wound (or other possibly deep cut) AND [2] to chest, abdomen, back, neck, or head    Negative: [1] Cutter (self-mutilator) AND [2] suicidal or out-of-control    Negative: Sounds like a life-threatening emergency to the triager    Negative: [1] Animal bite AND [2] broken skin    Negative: [1] Human bite AND [2] broken skin    Negative: [1] Bleeding AND [2] won't stop after 10 minutes of direct pressure (using correct technique)    Negative: Skin is split open or gaping (or length > 1/2 inch or 12 mm on the skin, 1/4 inch or 6 mm on the face)    Negative: [1] Deep cut AND [2] can see bone or tendons    Negative: Sensation of something in the wound (i.e., retained object in wound)    Negative: [1] Dirt in the wound AND [2] not removed with 15 minutes of scrubbing    Negative: Wound causes numbness (i.e., loss of sensation)    Negative: Wound causes weakness (i.e., decreased ability to move hand, finger, toe)    Negative: [1] Looks infected (spreading redness, pus) AND [2] no fever    Negative: No prior tetanus shots (or is not fully vaccinated)    Negative: [1] HIV positive or severe immunodeficiency (severely weak immune system) AND [2] DIRTY cut    Negative: [1] Last tetanus shot > 5 years ago AND [2] DIRTY cut    Negative: [1] Last tetanus shot > 10 years ago AND [2] CLEAN cut    Negative: [1] After 14 days AND [2] wound isn't healed    Negative: [1] Has diabetes (diabetes  mellitus) AND [2] minor cut or scratch on foot    Negative: [1] Cutter (self-mutilator) AND [2] stable (i.e., not suicidal, not out of control)    Protocols used: CUTS AND [id number]

## 2021-09-26 ENCOUNTER — HEALTH MAINTENANCE LETTER (OUTPATIENT)
Age: 66
End: 2021-09-26

## 2021-09-27 ENCOUNTER — MYC MEDICAL ADVICE (OUTPATIENT)
Dept: FAMILY MEDICINE | Facility: OTHER | Age: 66
End: 2021-09-27

## 2021-12-23 ENCOUNTER — MYC MEDICAL ADVICE (OUTPATIENT)
Dept: FAMILY MEDICINE | Facility: OTHER | Age: 66
End: 2021-12-23
Payer: COMMERCIAL

## 2021-12-23 DIAGNOSIS — I10 HYPERTENSION GOAL BP (BLOOD PRESSURE) < 140/90: ICD-10-CM

## 2021-12-24 RX ORDER — POTASSIUM CHLORIDE 1500 MG/1
TABLET, EXTENDED RELEASE ORAL
Qty: 270 TABLET | Refills: 3 | OUTPATIENT
Start: 2021-12-24

## 2021-12-24 NOTE — TELEPHONE ENCOUNTER
This was refilled for a year supply on 3/11/21. Refill too soon.  Alexa Chino RN on 12/24/2021 at 1:10 PM

## 2022-01-25 RX ORDER — POTASSIUM CHLORIDE 1500 MG/1
TABLET, EXTENDED RELEASE ORAL
Qty: 270 TABLET | Refills: 0 | Status: SHIPPED | OUTPATIENT
Start: 2022-01-25 | End: 2022-02-25

## 2022-02-25 DIAGNOSIS — I10 HYPERTENSION GOAL BP (BLOOD PRESSURE) < 140/90: ICD-10-CM

## 2022-02-25 DIAGNOSIS — M19.012 PRIMARY OSTEOARTHRITIS OF LEFT SHOULDER: ICD-10-CM

## 2022-02-25 DIAGNOSIS — N95.1 MENOPAUSAL SYNDROME (HOT FLASHES): ICD-10-CM

## 2022-02-25 RX ORDER — ATENOLOL AND CHLORTHALIDONE TABLET 50; 25 MG/1; MG/1
1 TABLET ORAL DAILY
Qty: 90 TABLET | Refills: 0 | Status: SHIPPED | OUTPATIENT
Start: 2022-02-25 | End: 2022-04-12

## 2022-02-25 RX ORDER — POTASSIUM CHLORIDE 1500 MG/1
TABLET, EXTENDED RELEASE ORAL
Qty: 270 TABLET | Refills: 0 | Status: SHIPPED | OUTPATIENT
Start: 2022-02-25 | End: 2022-04-12

## 2022-02-25 RX ORDER — MELOXICAM 15 MG/1
TABLET ORAL
Qty: 90 TABLET | Refills: 0 | Status: SHIPPED | OUTPATIENT
Start: 2022-02-25 | End: 2022-04-12

## 2022-02-25 RX ORDER — ESTRADIOL 1 MG/1
1 TABLET ORAL DAILY
Qty: 60 TABLET | Refills: 0 | Status: SHIPPED | OUTPATIENT
Start: 2022-02-25 | End: 2022-04-12

## 2022-02-25 NOTE — TELEPHONE ENCOUNTER
Pending Prescriptions:                       Disp   Refills    potassium chloride ER (K-TAB) 20 MEQ CR t*270 ta*0            Sig: TAKE 1 TABLET THREE TIMES A DAY    estradiol (ESTRACE) 1 MG tablet           30 tab*0            Sig: Take 1 tablet (1 mg) by mouth daily    meloxicam (MOBIC) 15 MG tablet            90 tab*2          atenolol-chlorthalidone (TENORETIC) 50-25*90 tab*3            Sig: Take 1 tablet by mouth daily      Telephone call    Patient calling to report she had to reschedule her physical do to a cold.  She is requesting refills for a 30 days to get her through to her appointment   Routing to PCP    Shannon Borja RN   [facility] Nurse Advisor  6:20 AM 2/25/2022

## 2022-03-30 ENCOUNTER — OFFICE VISIT (OUTPATIENT)
Dept: OPHTHALMOLOGY | Facility: CLINIC | Age: 67
End: 2022-03-30
Payer: COMMERCIAL

## 2022-03-30 DIAGNOSIS — H02.834 DERMATOCHALASIS OF BOTH UPPER EYELIDS: Primary | ICD-10-CM

## 2022-03-30 DIAGNOSIS — H02.831 DERMATOCHALASIS OF BOTH UPPER EYELIDS: Primary | ICD-10-CM

## 2022-03-30 PROCEDURE — 99204 OFFICE O/P NEW MOD 45 MIN: CPT | Mod: GC | Performed by: OPHTHALMOLOGY

## 2022-03-30 PROCEDURE — 92081 LIMITED VISUAL FIELD XM: CPT | Mod: GC | Performed by: OPHTHALMOLOGY

## 2022-03-30 PROCEDURE — 92285 EXTERNAL OCULAR PHOTOGRAPHY: CPT | Mod: GC | Performed by: OPHTHALMOLOGY

## 2022-03-30 ASSESSMENT — VISUAL ACUITY
OS_CC: 20/80
OD_CC+: -1
OD_CC: 20/30
METHOD: SNELLEN - LINEAR

## 2022-03-30 ASSESSMENT — SLIT LAMP EXAM - LIDS
COMMENTS: HEAVY DERMATOCHALASIS RESTING ON LASHES
COMMENTS: HEAVY DERMATOCHALASIS RESTING ON LASHES

## 2022-03-30 ASSESSMENT — EXTERNAL EXAM - RIGHT EYE: OD_EXAM: BROW PTOSIS, WITH FRONTALIS RELAXED, BROW IS BELOW SUPERIOR ORBITAL RIM AND LATERALLY INLINE WITH UPPER LASHES

## 2022-03-30 ASSESSMENT — TONOMETRY
OD_IOP_MMHG: 18
OS_IOP_MMHG: 15
IOP_METHOD: ICARE

## 2022-03-30 ASSESSMENT — CONF VISUAL FIELD: COMMENTS: TANGENT VISUAL FIELD PERFORMED TODAY.

## 2022-03-30 ASSESSMENT — EXTERNAL EXAM - LEFT EYE
OS_EXAM: BROW PTOSIS, WITH FRONTALIS RELAXED, BROW IS BELOW SUPERIOR ORBITAL RIM AND LATERALLY INLINE WITH UPPER LASHES WORSE ON THE LEFT

## 2022-03-30 NOTE — LETTER
3/30/2022         RE:  :  MRN: Christiana Lopes  1955  [id number]     Dear Dr. Hancock,    Thank you for asking me to see your patient, Christiana Lopes, for an oculoplastic   consultation.  My assessment and plan are below.  For further details, please see my attached clinic note.           HPI:     Chief Complaint(s) and History of Present Illness(es)     Droopy Eye Lid Evaluation     Laterality: right upper lid and left upper lid        Comments     Patient referred by Dr. Hancock for dermatochalasis consultation, each   eye. Patient states that 2 years ago she noticed her lids started   drooping. Her Dads lids did the same thing. Over the past two years they   have continued to get worse. She notes needing to raise her eyebrows up in   order to see better. Also notes reduced peripheral vision while driving.   Patient is very active outside and can tell her field of vision is reduced   due to the drooping lids.   No Pain.     Christiana Lopes is a 66 year old female who has noted gradual onset of droopy eyelids over the past 5 years. The droopy eyelid is interfering with activities of daily living including horseback riding. The patient denies double vision, variability of the eyelid position, or dry eye symptoms.     EXAM:     MRD1: 2mm, 2mm   Dermatochalasis with excess skin touching eyelashes  Brow ptosis   VISUAL FIELD:  Right eye untaped:24 degrees Right eye taped:50 degrees  Left eye untaped:22 degrees Left eye taped:50 degrees    Assessment & Plan     Christiana Lopes is a 66 year old female with the following diagnoses:   1. Dermatochalasis of both upper eyelids    2. Ptosis of both eyelids       Both upper eyelid blepharoplasty (skin, ibp, left is lower) 43815     Does have mild aponeurotic ptosis on the left will observe.   ANTICOAGULATION:    Aspirin no  Plavix no  Coumadin no  Supplements (fish Oil, vitamin E) fish oil        Again, thank you for allowing me to participate in the care of  your patient.      Sincerely,    Lico Ma MD  Department of Ophthalmology and Visual Neurosciences  [facility]    CC: [place]  [address] [address] [facility] [address]  Via In Basket

## 2022-03-30 NOTE — PROGRESS NOTES
Oculoplastic Clinic New Patient    Patient: Christiana Lopes MRN# [id number]   YOB: 1955 Age: 66 year old   Date of Visit: Mar 30, 2022    CC: Droopy eyelids obstructing vision.              HPI:     Chief Complaint(s) and History of Present Illness(es)     Droopy Eye Lid Evaluation     Laterality: right upper lid and left upper lid        Comments     Patient referred by Dr. Hancock for dermatochalasis consultation, each   eye. Patient states that 2 years ago she noticed her lids started   drooping. Her Dads lids did the same thing. Over the past two years they   have continued to get worse. She notes needing to raise her eyebrows up in   order to see better. Also notes reduced peripheral vision while driving.   Patient is very active outside and can tell her field of vision is reduced   due to the drooping lids.   No Pain.     Christiana Lopes is a 66 year old female who has noted gradual onset of droopy eyelids over the past 5 years. The droopy eyelid is interfering with activities of daily living including horseback riding. The patient denies double vision, variability of the eyelid position, or dry eye symptoms.     EXAM:     MRD1: 2mm, 2mm   Dermatochalasis with excess skin touching eyelashes  Brow ptosis   VISUAL FIELD:  Right eye untaped:24 degrees Right eye taped:50 degrees  Left eye untaped:22 degrees Left eye taped:50 degrees    Assessment & Plan     Christiana Lopes is a 66 year old female with the following diagnoses:   1. Dermatochalasis of both upper eyelids    2. Ptosis of both eyelids       Both upper eyelid blepharoplasty (skin, ibp, left is lower) 62507     Does have mild aponeurotic ptosis on the left will observe.   ANTICOAGULATION:    Aspirin no  Plavix no  Coumadin no  Supplements (fish Oil, vitamin E) fish oil         PHOTOS DEMONSTRATE:    Significant dermatochalasis with lids resting on eyelashes and obstructing visual axis  Brow ptosis    Susan Rowland MD  Ophthalmology  Resident, PGY-2  [facility]      Attending Physician Attestation: Complete documentation of historical and exam elements from today's encounter can be found in the full encounter summary report (not reduplicated in this progress note). I personally obtained the chief complaint(s) and history of present illness. I confirmed and edited as necessary the review of systems, past medical/surgical history, family history, social history, and examination findings as documented by others; and I examined the patient myself. I personally reviewed the relevant tests, images, and reports as documented above. I formulated and edited as necessary the assessment and plan and discussed the findings and management plan with the patient. Lico Ma MD      Today with Christiana Lopes I reviewed the indications, risks, benefits, and alternatives of the proposed surgical procedure including, but not limited to, failure obtain the desired result  and need for additional surgery, bleeding, infection, loss of vision, loss of the eye, and the remote possibility of permanent damage to any organ system or death with the use of anesthesia.  I provided multiple opportunities for the questions, answered all questions to the best of my ability, and confirmed that my answers and my discussion were understood.

## 2022-03-30 NOTE — NURSING NOTE
Chief Complaints and History of Present Illnesses   Patient presents with     Droopy Eye Lid Evaluation       Chief Complaint(s) and History of Present Illness(es)     Droopy Eye Lid Evaluation     Laterality: right upper lid and left upper lid              Comments     Patient referred by Dr. Hancock for dermatochalasis consultation, each eye. Patient states that 2 years ago she noticed her lids started drooping. Her Dads lids did the same thing. Over the past two years they have continued to get worse. She notes needing to raise her eyebrows up in order to see better. Also notes reduced peripheral vision while driving. Patient is very active outside and can tell her field of vision is reduced due to the drooping lids.   No Pain.                     Kannan Lopez, Ophthalmic Assistant

## 2022-03-31 ENCOUNTER — MYC MEDICAL ADVICE (OUTPATIENT)
Dept: FAMILY MEDICINE | Facility: OTHER | Age: 67
End: 2022-03-31
Payer: COMMERCIAL

## 2022-03-31 DIAGNOSIS — Z11.59 ENCOUNTER FOR SCREENING FOR OTHER VIRAL DISEASES: Primary | ICD-10-CM

## 2022-04-01 NOTE — TELEPHONE ENCOUNTER
Can we make this appointment longer so she has time (she has an approv req slot right after this appointment time right now) and make sure that the template is correct for physical and preop.  Also route back to TC to review and confirm this is ok or she can change it back and make recommendations for these appointments. Flag as PCP only.     LEANNE SmithC

## 2022-04-04 NOTE — TELEPHONE ENCOUNTER
Please extend the appointment (so give it the full hour), have the visit type as a pre-op and then at day of visit can also add the physical template.  In appointment notes, please state Pre-op and physical.    Sarah Lim  [facility] Float Pool 3 days ago     DG    Registration can't flag, and we can't schedule two different types of visit under one. We can extend the pre-op visit but can't change the codes or we can make two separate ones.     TCs, can you help?    Message text

## 2022-04-06 ENCOUNTER — OFFICE VISIT (OUTPATIENT)
Dept: OPTOMETRY | Facility: CLINIC | Age: 67
End: 2022-04-06
Payer: COMMERCIAL

## 2022-04-06 DIAGNOSIS — H02.834 DERMATOCHALASIS OF BOTH UPPER EYELIDS: Primary | ICD-10-CM

## 2022-04-06 DIAGNOSIS — H52.223 REGULAR ASTIGMATISM OF BOTH EYES: ICD-10-CM

## 2022-04-06 DIAGNOSIS — H25.043 POSTERIOR SUBCAPSULAR POLAR SENILE CATARACT OF BOTH EYES: ICD-10-CM

## 2022-04-06 DIAGNOSIS — H02.831 DERMATOCHALASIS OF BOTH UPPER EYELIDS: Primary | ICD-10-CM

## 2022-04-06 DIAGNOSIS — H52.4 PRESBYOPIA: ICD-10-CM

## 2022-04-06 PROCEDURE — 92014 COMPRE OPH EXAM EST PT 1/>: CPT | Performed by: OPTOMETRIST

## 2022-04-06 PROCEDURE — 92015 DETERMINE REFRACTIVE STATE: CPT | Performed by: OPTOMETRIST

## 2022-04-06 ASSESSMENT — REFRACTION_MANIFEST
OD_SPHERE: 0.00
OS_ADD: +2.00
OD_SPHERE: +0.50
OS_SPHERE: -0.75
OS_SPHERE: -0.75
OD_CYLINDER: +1.25
OS_CYLINDER: +1.75
OD_ADD: +2.00
OD_CYLINDER: +1.50
OD_AXIS: 007
OS_AXIS: 005
OS_CYLINDER: +1.75
OD_ADD: +2.00
OS_AXIS: 170
METHOD_AUTOREFRACTION: 1
OD_AXIS: 170
OS_ADD: +2.00

## 2022-04-06 ASSESSMENT — KERATOMETRY
OS_K1POWER_DIOPTERS: 46.00
OD_K2POWER_DIOPTERS: 45.50
OD_K1POWER_DIOPTERS: 47.00
OS_K2POWER_DIOPTERS: 44.75
OS_AXISANGLE2_DEGREES: 168
OD_AXISANGLE2_DEGREES: 175

## 2022-04-06 ASSESSMENT — CONF VISUAL FIELD
METHOD: COUNTING FINGERS
OD_NORMAL: 1
OS_NORMAL: 1

## 2022-04-06 ASSESSMENT — CUP TO DISC RATIO
OD_RATIO: 0.1
OS_RATIO: 0.1

## 2022-04-06 ASSESSMENT — TONOMETRY
IOP_METHOD: APPLANATION
OS_IOP_MMHG: 14
OD_IOP_MMHG: 14

## 2022-04-06 ASSESSMENT — VISUAL ACUITY
OD_CC: 20/25
OS_PH_SC+: -2
OD_SC: 20/30
CORRECTION_TYPE: GLASSES
METHOD: SNELLEN - LINEAR
OD_SC+: -1
OS_CC: 20/50
OS_PH_SC: 20/50
OS_SC: 20/70

## 2022-04-06 ASSESSMENT — EXTERNAL EXAM - RIGHT EYE: OD_EXAM: BROW PTOSIS, WITH FRONTALIS RELAXED, BROW IS BELOW SUPERIOR ORBITAL RIM AND LATERALLY INLINE WITH UPPER LASHES

## 2022-04-06 ASSESSMENT — REFRACTION_WEARINGRX
OD_SPHERE: +1.75
OS_SPHERE: +1.75
SPECS_TYPE: OTC'S

## 2022-04-06 NOTE — PROGRESS NOTES
"Chief Complaint   Patient presents with     COMPREHENSIVE EYE EXAM         Last Eye Exam: 4/5/21  Dilated Previously: Yes    What are you currently using to see?  readers       Distance Vision Acuity: Noticed gradual change in both eyes, but more in her right eye     Near Vision Acuity: Satisfied with vision while reading and using computer with readers. She said that she sometimes has \"blotches\" that are cloudy and in her line of vision at times - can blink  In both eyes     Eye Comfort: good, Patient has Lid surgery scheduled for 4/18/22 with Lico.   Do you use eye drops? 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       OBJECTIVE: See Ophthalmology exam    ASSESSMENT:    ICD-10-CM    1. Dermatochalasis of both upper eyelids  H02.831     H02.834    2. Regular astigmatism of both eyes  H52.223    3. Presbyopia  H52.4    4. Posterior subcapsular polar senile cataract of both eyes  H25.043 Adult Eye Referral    L>>R       PLAN:     Patient Instructions   Moderate cataracts that affect vision, cataract left eye evaluation advised   [facility]  DR Kiki KING [facility]   [address] [place] [address]. [place] [address]   [phone]             Return in 1 year for eye exam    Therese Hancock, OD  [phone]                   "

## 2022-04-06 NOTE — PATIENT INSTRUCTIONS
Moderate cataracts that affect vision, cataract left eye evaluation advised   [facility]  DR Kiki Swanson  [facility]   [address]. [place] [address]   [phone]             Return in 1 year for eye exam    Therese Hancock, OD  [phone]

## 2022-04-06 NOTE — LETTER
"    4/6/2022         RE: Christiana Lopes  [address]  [place] [address]        Dear Colleague,    Thank you for referring your patient, Christiana Lopes, to the [facility]. Please see a copy of my visit note below.    Chief Complaint   Patient presents with     COMPREHENSIVE EYE EXAM         Last Eye Exam: 4/5/21  Dilated Previously: Yes    What are you currently using to see?  readers       Distance Vision Acuity: Noticed gradual change in both eyes, but more in her right eye     Near Vision Acuity: Satisfied with vision while reading and using computer with readers. She said that she sometimes has \"blotches\" that are cloudy and in her line of vision at times - can blink  In both eyes     Eye Comfort: good, Patient has Lid surgery scheduled for 4/18/22 with Lico.   Do you use eye drops? 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       OBJECTIVE: See Ophthalmology exam    ASSESSMENT:    ICD-10-CM    1. Dermatochalasis of both upper eyelids  H02.831     H02.834    2. Regular astigmatism of both eyes  H52.223    3. Presbyopia  H52.4    4. Posterior subcapsular polar senile cataract of both eyes  H25.043 Adult Eye Referral    L>>R       PLAN:     Patient Instructions   Moderate cataracts that affect vision, cataract left eye evaluation advised   [facility]  DR Kiki Swanson  [facility]   [address] [place] [address]. [place] [address]   [phone]             Return in 1 year for eye exam    Therese Hancock, OD  [phone]                       Again, thank you for allowing me to participate in the care of your patient.        Sincerely,        Therese Hancock, OD    "

## 2022-04-12 ENCOUNTER — OFFICE VISIT (OUTPATIENT)
Dept: FAMILY MEDICINE | Facility: OTHER | Age: 67
End: 2022-04-12
Payer: COMMERCIAL

## 2022-04-12 VITALS
HEIGHT: 61 IN | HEART RATE: 52 BPM | BODY MASS INDEX: 26.06 KG/M2 | TEMPERATURE: 98.3 F | OXYGEN SATURATION: 99 % | DIASTOLIC BLOOD PRESSURE: 64 MMHG | RESPIRATION RATE: 20 BRPM | SYSTOLIC BLOOD PRESSURE: 122 MMHG | WEIGHT: 138 LBS

## 2022-04-12 DIAGNOSIS — Z00.00 ROUTINE GENERAL MEDICAL EXAMINATION AT A HEALTH CARE FACILITY: Primary | ICD-10-CM

## 2022-04-12 DIAGNOSIS — H02.831 DERMATOCHALASIS OF BOTH UPPER EYELIDS: ICD-10-CM

## 2022-04-12 DIAGNOSIS — R32 URINARY INCONTINENCE, UNSPECIFIED TYPE: ICD-10-CM

## 2022-04-12 DIAGNOSIS — M19.012 PRIMARY OSTEOARTHRITIS OF LEFT SHOULDER: ICD-10-CM

## 2022-04-12 DIAGNOSIS — H02.834 DERMATOCHALASIS OF BOTH UPPER EYELIDS: ICD-10-CM

## 2022-04-12 DIAGNOSIS — Z01.818 PREOP GENERAL PHYSICAL EXAM: ICD-10-CM

## 2022-04-12 DIAGNOSIS — H91.90 HEARING DIFFICULTY, UNSPECIFIED LATERALITY: ICD-10-CM

## 2022-04-12 DIAGNOSIS — I10 HYPERTENSION GOAL BP (BLOOD PRESSURE) < 140/90: ICD-10-CM

## 2022-04-12 DIAGNOSIS — N95.1 MENOPAUSAL SYNDROME (HOT FLASHES): ICD-10-CM

## 2022-04-12 LAB
ANION GAP SERPL CALCULATED.3IONS-SCNC: 7 MMOL/L (ref 3–14)
BUN SERPL-MCNC: 22 MG/DL (ref 7–30)
CALCIUM SERPL-MCNC: 9 MG/DL (ref 8.5–10.1)
CHLORIDE BLD-SCNC: 108 MMOL/L (ref 94–109)
CHOLEST SERPL-MCNC: 223 MG/DL
CO2 SERPL-SCNC: 26 MMOL/L (ref 20–32)
CREAT SERPL-MCNC: 0.92 MG/DL (ref 0.52–1.04)
ERYTHROCYTE [DISTWIDTH] IN BLOOD BY AUTOMATED COUNT: 12.7 % (ref 10–15)
FASTING STATUS PATIENT QL REPORTED: NO
GFR SERPL CREATININE-BSD FRML MDRD: 67 ML/MIN/1.73M2
GLUCOSE BLD-MCNC: 98 MG/DL (ref 70–99)
HCT VFR BLD AUTO: 35.5 % (ref 35–47)
HDLC SERPL-MCNC: 85 MG/DL
HGB BLD-MCNC: 11.6 G/DL (ref 11.7–15.7)
LDLC SERPL CALC-MCNC: 117 MG/DL
MCH RBC QN AUTO: 31.4 PG (ref 26.5–33)
MCHC RBC AUTO-ENTMCNC: 32.7 G/DL (ref 31.5–36.5)
MCV RBC AUTO: 96 FL (ref 78–100)
NONHDLC SERPL-MCNC: 138 MG/DL
PLATELET # BLD AUTO: 268 10E3/UL (ref 150–450)
POTASSIUM BLD-SCNC: 4.2 MMOL/L (ref 3.4–5.3)
RBC # BLD AUTO: 3.69 10E6/UL (ref 3.8–5.2)
SODIUM SERPL-SCNC: 141 MMOL/L (ref 133–144)
TRIGL SERPL-MCNC: 106 MG/DL
WBC # BLD AUTO: 5.2 10E3/UL (ref 4–11)

## 2022-04-12 PROCEDURE — 99214 OFFICE O/P EST MOD 30 MIN: CPT | Mod: 25 | Performed by: FAMILY MEDICINE

## 2022-04-12 PROCEDURE — 80061 LIPID PANEL: CPT | Performed by: FAMILY MEDICINE

## 2022-04-12 PROCEDURE — 80048 BASIC METABOLIC PNL TOTAL CA: CPT | Performed by: FAMILY MEDICINE

## 2022-04-12 PROCEDURE — 36415 COLL VENOUS BLD VENIPUNCTURE: CPT | Performed by: FAMILY MEDICINE

## 2022-04-12 PROCEDURE — 99397 PER PM REEVAL EST PAT 65+ YR: CPT | Performed by: FAMILY MEDICINE

## 2022-04-12 PROCEDURE — 85027 COMPLETE CBC AUTOMATED: CPT | Performed by: FAMILY MEDICINE

## 2022-04-12 RX ORDER — POTASSIUM CHLORIDE 1500 MG/1
TABLET, EXTENDED RELEASE ORAL
Qty: 270 TABLET | Refills: 3 | Status: SHIPPED | OUTPATIENT
Start: 2022-04-12 | End: 2023-04-14

## 2022-04-12 RX ORDER — ATENOLOL AND CHLORTHALIDONE TABLET 50; 25 MG/1; MG/1
1 TABLET ORAL DAILY
Qty: 90 TABLET | Refills: 3 | Status: SHIPPED | OUTPATIENT
Start: 2022-04-12 | End: 2023-04-14

## 2022-04-12 RX ORDER — ESTRADIOL 0.5 MG/1
0.5 TABLET ORAL DAILY
Qty: 90 TABLET | Refills: 3 | Status: SHIPPED | OUTPATIENT
Start: 2022-04-12 | End: 2023-04-14

## 2022-04-12 RX ORDER — MELOXICAM 15 MG/1
TABLET ORAL
Qty: 90 TABLET | Refills: 3 | Status: SHIPPED | OUTPATIENT
Start: 2022-04-12 | End: 2023-04-14

## 2022-04-12 ASSESSMENT — PAIN SCALES - GENERAL: PAINLEVEL: MILD PAIN (3)

## 2022-04-12 ASSESSMENT — ACTIVITIES OF DAILY LIVING (ADL): CURRENT_FUNCTION: NO ASSISTANCE NEEDED

## 2022-04-12 NOTE — PROGRESS NOTES
[address]  [facility] [address]  Phone: [phone]  Primary Provider: Jigna Lees    PREOPERATIVE EVALUATION:  Today's date: 4/12/2022    Christiana Lopes is a 66 year old female who presents for a preoperative evaluation.    Surgical Information:  Surgery/Procedure: BLEPHAROPLASTY  Surgery Location: [facility]  Surgeon: Dr. Allen  Surgery Date: 4/18/22  Time of Surgery: 1:05pm  Where patient plans to recover: At home with family  Fax number for surgical facility: Note does not need to be faxed, will be available electronically in Epic.    Type of Anesthesia Anticipated: to be determined    Assessment & Plan     The proposed surgical procedure is considered INTERMEDIATE risk.    Preop general physical exam    Dermatochalasis of both upper eyelids    Hypertension goal BP (blood pressure) < 140/90  Well controlled    Medication Instructions:   - meloxicam (Mobic): HOLD 10 days before surgery.     RECOMMENDATION:  APPROVAL GIVEN to proceed with proposed procedure, without further diagnostic evaluation.        Subjective     HPI related to upcoming procedure: bilateral upper eyelid ptosis    Preop Questions 4/12/2022   1. Have you ever had a heart attack or stroke? No   2. Have you ever had surgery on your heart or blood vessels, such as a stent placement, a coronary artery bypass, or surgery on an artery in your head, neck, heart, or legs? No   3. Do you have chest pain with activity? No   4. Do you have a history of  heart failure? No   5. Do you currently have a cold, bronchitis or symptoms of other infection? No   6. Do you have a cough, shortness of breath, or wheezing? No   7. Do you or anyone in your family have previous history of blood clots? YES - father   8. Do you or does anyone in your family have a serious bleeding problem such as prolonged bleeding following surgeries or cuts? No   9. Have you ever had problems  with anemia or been told to take iron pills? No   10. Have you had any abnormal blood loss such as black, tarry or bloody stools, or abnormal vaginal bleeding? No   11. Have you ever had a blood transfusion? No   12. Are you willing to have a blood transfusion if it is medically needed before, during, or after your surgery? Yes   13. Have you or any of your relatives ever had problems with anesthesia? No   14. Do you have sleep apnea, excessive snoring or daytime drowsiness? No   15. Do you have any artifical heart valves or other implanted medical devices like a pacemaker, defibrillator, or continuous glucose monitor? No   16. Do you have artificial joints? YES - left shoulder   17. Are you allergic to latex? No   18. Is there any chance that you may be pregnant? -       Health Care Directive:  Patient does not have a Health Care Directive or Living Will: Discussed advance care planning with patient; information given to patient to review.    Preoperative Review of :   reviewed - no record of controlled substances prescribed.    Status of Chronic Conditions:  HYPERTENSION - Patient has longstanding history of HTN , currently denies any symptoms referable to elevated blood pressure. Specifically denies chest pain, palpitations, dyspnea, orthopnea, PND or peripheral edema. Blood pressure readings have been in normal range. Current medication regimen is as listed below. Patient denies any side effects of medication.       Review of Systems  CONSTITUTIONAL: NEGATIVE for fever, chills, change in weight  INTEGUMENTARY/SKIN: NEGATIVE for worrisome rashes, moles or lesions  EYES: bilateral upper eyelid ptosis  ENT/MOUTH: NEGATIVE for ear, mouth and throat problems  RESP: NEGATIVE for significant cough or SOB  CV: NEGATIVE for chest pain, palpitations or peripheral edema  GI: NEGATIVE for nausea, abdominal pain, heartburn, or change in bowel habits  : NEGATIVE for frequency, dysuria, or hematuria  MUSCULOSKELETAL:  "NEGATIVE for significant arthralgias or myalgia  NEURO: NEGATIVE for weakness, dizziness or paresthesias  ENDOCRINE: NEGATIVE for temperature intolerance, skin/hair changes  HEME: NEGATIVE for bleeding problems  PSYCHIATRIC: NEGATIVE for changes in mood or affect    Patient Active Problem List    Diagnosis Date Noted     Dermatochalasis of both upper eyelids 03/30/2022     Priority: Medium     Added automatically from request for surgery [id number]       History of cervical cancer 06/26/2013     Priority: Medium     1990 Hysterectomy due to cervical cancer. She was told it went deeper than they expected but they got it all.  10/27/09 NIL pap. Exam notes report, \"Cervix is without inflammation or abnormal discharge\". Continue cervical cancer screening.   8/25/11 NIL pap/neg HR HPV  6/26/13 NIL pap  11/8/16 NIL pap/neg HR HPV.          Hypertension goal BP (blood pressure) < 140/90 12/12/2010     Priority: Medium      Past Medical History:   Diagnosis Date     Arthritis     ostio     Cervix cancer (H) 1990     Depression     after 's death     History of left breast biopsy 2011    negative     HTN      UTI     history of      Past Surgical History:   Procedure Laterality Date     ARTHROPLASTY SHOULDER Left 2/12/2020    Procedure: Left shoulder hemiarthroplasy with bicep tenodesis;  Surgeon: Nura Cole MD;  Location:  OR     ARTHROSCOPY SHOULDER Left 2/12/2020    Procedure: left shoulder arthroscopy;  Surgeon: Nura Cole MD;  Location:  OR     BACK SURGERY  2009     BIOPSY BREAST  2011    benign     C/SECTION, CLASSICAL      x2     COLONOSCOPY  2005     COLONOSCOPY N/A 2/12/2018    Procedure: COLONOSCOPY;  COLONOSCOPY;  Surgeon: Ken Fair MD;  Location:  GI     HEAD & NECK SURGERY  2011     HERNIA REPAIR  2003     HYSTERECTOMY  1990    one ovary removed, h/o cervical cancer     [facility] APPENDECTOMY       [facility] ENLARGE BREAST  2006     [facility] NONSPECIFIC PROCEDURE  2-04    ventral " "hernia repair     [facility] NONSPECIFIC PROCEDURE  11    Lumbar fusion L5-S1     Z NONSPECIFIC PROCEDURE      cervical fusion C3     Current Outpatient Medications   Medication Sig Dispense Refill     atenolol-chlorthalidone (TENORETIC) 50-25 MG tablet Take 1 tablet by mouth daily 90 tablet 0     estradiol (ESTRACE) 1 MG tablet Take 1 tablet (1 mg) by mouth daily 60 tablet 0     fish oil-omega-3 fatty acids 1000 MG capsule Take 2 g by mouth daily.       MAGNESIUM PO        meloxicam (MOBIC) 15 MG tablet TAKE 1 TABLET DAILY AS NEEDED 90 tablet 0     Methylsulfonylmethane (MSM PO)        Multiple Vitamin (MULTIVITAMIN OR) Take 1 tablet by mouth daily.       potassium chloride ER (K-TAB) 20 MEQ CR tablet TAKE 1 TABLET THREE TIMES A  tablet 0     ST JOHNS WORT PO Take by mouth 2 times daily          No Known Allergies     Social History     Tobacco Use     Smoking status: Former Smoker     Packs/day: 1.00     Years: 2.00     Pack years: 2.00     Types: Cigarettes     Quit date: 2010     Years since quittin.7     Smokeless tobacco: Never Used     Tobacco comment: no use of tobacco products   Substance Use Topics     Alcohol use: Yes     Comment: socially       History   Drug Use No         Objective     /64   Pulse 52   Temp 98.3  F (36.8  C) (Temporal)   Resp 20   Ht 1.549 m (5' 1\")   Wt 62.6 kg (138 lb)   SpO2 99%   BMI 26.07 kg/m      Physical Exam  Constitutional:       General: She is not in acute distress.     Appearance: She is well-developed.   HENT:      Right Ear: Tympanic membrane and external ear normal.      Left Ear: Tympanic membrane and external ear normal.      Nose: Nose normal.   Eyes:      General:         Right eye: No discharge.         Left eye: No discharge.      Conjunctiva/sclera: Conjunctivae normal.      Pupils: Pupils are equal, round, and reactive to light.   Neck:      Thyroid: No thyromegaly.      Trachea: No tracheal deviation.   Cardiovascular:      Rate " and Rhythm: Normal rate and regular rhythm.      Heart sounds: Normal heart sounds, S1 normal and S2 normal. No murmur heard.  Pulmonary:      Effort: Pulmonary effort is normal. No respiratory distress.      Breath sounds: Normal breath sounds. No wheezing or rales.   Abdominal:      General: Bowel sounds are normal.      Palpations: Abdomen is soft. There is no mass.      Tenderness: There is no abdominal tenderness.   Musculoskeletal:         General: Normal range of motion.      Cervical back: Neck supple.   Lymphadenopathy:      Cervical: No cervical adenopathy.   Skin:     General: Skin is warm and dry.      Findings: No rash.   Neurological:      Mental Status: She is alert and oriented to person, place, and time.      Deep Tendon Reflexes: Reflexes are normal and symmetric.   Psychiatric:         Thought Content: Thought content normal.         Judgment: Judgment normal.           Recent Labs   Lab Test 02/19/21  1551      POTASSIUM 3.9   CR 0.78        Diagnostics:  Labs pending at this time.  Results will be reviewed when available.   No EKG required, no history of coronary heart disease, significant arrhythmia, peripheral arterial disease or other structural heart disease.    Revised Cardiac Risk Index (RCRI):  The patient has the following serious cardiovascular risks for perioperative complications:   - No serious cardiac risks = 0 points     RCRI Interpretation: 0 points: Class I (very low risk - 0.4% complication rate)           Signed Electronically by: Jigna Lees MD  Copy of this evaluation report is provided to requesting physician.

## 2022-04-12 NOTE — H&P (VIEW-ONLY)
[address]  [facility] [address]  Phone: [phone]  Primary Provider: Jigna Lees    PREOPERATIVE EVALUATION:  Today's date: 4/12/2022    Christiana Lopes is a 66 year old female who presents for a preoperative evaluation.    Surgical Information:  Surgery/Procedure: BLEPHAROPLASTY  Surgery Location: [facility]  Surgeon: Dr. Allen  Surgery Date: 4/18/22  Time of Surgery: 1:05pm  Where patient plans to recover: At home with family  Fax number for surgical facility: Note does not need to be faxed, will be available electronically in Epic.    Type of Anesthesia Anticipated: to be determined    Assessment & Plan     The proposed surgical procedure is considered INTERMEDIATE risk.    Preop general physical exam    Dermatochalasis of both upper eyelids    Hypertension goal BP (blood pressure) < 140/90  Well controlled    Medication Instructions:   - meloxicam (Mobic): HOLD 10 days before surgery.     RECOMMENDATION:  APPROVAL GIVEN to proceed with proposed procedure, without further diagnostic evaluation.        Subjective     HPI related to upcoming procedure: bilateral upper eyelid ptosis    Preop Questions 4/12/2022   1. Have you ever had a heart attack or stroke? No   2. Have you ever had surgery on your heart or blood vessels, such as a stent placement, a coronary artery bypass, or surgery on an artery in your head, neck, heart, or legs? No   3. Do you have chest pain with activity? No   4. Do you have a history of  heart failure? No   5. Do you currently have a cold, bronchitis or symptoms of other infection? No   6. Do you have a cough, shortness of breath, or wheezing? No   7. Do you or anyone in your family have previous history of blood clots? YES - father   8. Do you or does anyone in your family have a serious bleeding problem such as prolonged bleeding following surgeries or cuts? No   9. Have you ever had problems  with anemia or been told to take iron pills? No   10. Have you had any abnormal blood loss such as black, tarry or bloody stools, or abnormal vaginal bleeding? No   11. Have you ever had a blood transfusion? No   12. Are you willing to have a blood transfusion if it is medically needed before, during, or after your surgery? Yes   13. Have you or any of your relatives ever had problems with anesthesia? No   14. Do you have sleep apnea, excessive snoring or daytime drowsiness? No   15. Do you have any artifical heart valves or other implanted medical devices like a pacemaker, defibrillator, or continuous glucose monitor? No   16. Do you have artificial joints? YES - left shoulder   17. Are you allergic to latex? No   18. Is there any chance that you may be pregnant? -       Health Care Directive:  Patient does not have a Health Care Directive or Living Will: Discussed advance care planning with patient; information given to patient to review.    Preoperative Review of :   reviewed - no record of controlled substances prescribed.    Status of Chronic Conditions:  HYPERTENSION - Patient has longstanding history of HTN , currently denies any symptoms referable to elevated blood pressure. Specifically denies chest pain, palpitations, dyspnea, orthopnea, PND or peripheral edema. Blood pressure readings have been in normal range. Current medication regimen is as listed below. Patient denies any side effects of medication.       Review of Systems  CONSTITUTIONAL: NEGATIVE for fever, chills, change in weight  INTEGUMENTARY/SKIN: NEGATIVE for worrisome rashes, moles or lesions  EYES: bilateral upper eyelid ptosis  ENT/MOUTH: NEGATIVE for ear, mouth and throat problems  RESP: NEGATIVE for significant cough or SOB  CV: NEGATIVE for chest pain, palpitations or peripheral edema  GI: NEGATIVE for nausea, abdominal pain, heartburn, or change in bowel habits  : NEGATIVE for frequency, dysuria, or hematuria  MUSCULOSKELETAL:  "NEGATIVE for significant arthralgias or myalgia  NEURO: NEGATIVE for weakness, dizziness or paresthesias  ENDOCRINE: NEGATIVE for temperature intolerance, skin/hair changes  HEME: NEGATIVE for bleeding problems  PSYCHIATRIC: NEGATIVE for changes in mood or affect    Patient Active Problem List    Diagnosis Date Noted     Dermatochalasis of both upper eyelids 03/30/2022     Priority: Medium     Added automatically from request for surgery [id number]       History of cervical cancer 06/26/2013     Priority: Medium     1990 Hysterectomy due to cervical cancer. She was told it went deeper than they expected but they got it all.  10/27/09 NIL pap. Exam notes report, \"Cervix is without inflammation or abnormal discharge\". Continue cervical cancer screening.   8/25/11 NIL pap/neg HR HPV  6/26/13 NIL pap  11/8/16 NIL pap/neg HR HPV.          Hypertension goal BP (blood pressure) < 140/90 12/12/2010     Priority: Medium      Past Medical History:   Diagnosis Date     Arthritis     ostio     Cervix cancer (H) 1990     Depression     after 's death     History of left breast biopsy 2011    negative     HTN      UTI     history of      Past Surgical History:   Procedure Laterality Date     ARTHROPLASTY SHOULDER Left 2/12/2020    Procedure: Left shoulder hemiarthroplasy with bicep tenodesis;  Surgeon: Nura Cole MD;  Location:  OR     ARTHROSCOPY SHOULDER Left 2/12/2020    Procedure: left shoulder arthroscopy;  Surgeon: Nura Cole MD;  Location:  OR     BACK SURGERY  2009     BIOPSY BREAST  2011    benign     C/SECTION, CLASSICAL      x2     COLONOSCOPY  2005     COLONOSCOPY N/A 2/12/2018    Procedure: COLONOSCOPY;  COLONOSCOPY;  Surgeon: Ken Fair MD;  Location:  GI     HEAD & NECK SURGERY  2011     HERNIA REPAIR  2003     HYSTERECTOMY  1990    one ovary removed, h/o cervical cancer     ZC APPENDECTOMY       [facility] ENLARGE BREAST  2006     [facility] NONSPECIFIC PROCEDURE  2-04    ventral " "hernia repair     [facility] NONSPECIFIC PROCEDURE  11    Lumbar fusion L5-S1     Z NONSPECIFIC PROCEDURE      cervical fusion C3     Current Outpatient Medications   Medication Sig Dispense Refill     atenolol-chlorthalidone (TENORETIC) 50-25 MG tablet Take 1 tablet by mouth daily 90 tablet 0     estradiol (ESTRACE) 1 MG tablet Take 1 tablet (1 mg) by mouth daily 60 tablet 0     fish oil-omega-3 fatty acids 1000 MG capsule Take 2 g by mouth daily.       MAGNESIUM PO        meloxicam (MOBIC) 15 MG tablet TAKE 1 TABLET DAILY AS NEEDED 90 tablet 0     Methylsulfonylmethane (MSM PO)        Multiple Vitamin (MULTIVITAMIN OR) Take 1 tablet by mouth daily.       potassium chloride ER (K-TAB) 20 MEQ CR tablet TAKE 1 TABLET THREE TIMES A  tablet 0     ST JOHNS WORT PO Take by mouth 2 times daily          No Known Allergies     Social History     Tobacco Use     Smoking status: Former Smoker     Packs/day: 1.00     Years: 2.00     Pack years: 2.00     Types: Cigarettes     Quit date: 2010     Years since quittin.7     Smokeless tobacco: Never Used     Tobacco comment: no use of tobacco products   Substance Use Topics     Alcohol use: Yes     Comment: socially       History   Drug Use No         Objective     /64   Pulse 52   Temp 98.3  F (36.8  C) (Temporal)   Resp 20   Ht 1.549 m (5' 1\")   Wt 62.6 kg (138 lb)   SpO2 99%   BMI 26.07 kg/m      Physical Exam  Constitutional:       General: She is not in acute distress.     Appearance: She is well-developed.   HENT:      Right Ear: Tympanic membrane and external ear normal.      Left Ear: Tympanic membrane and external ear normal.      Nose: Nose normal.   Eyes:      General:         Right eye: No discharge.         Left eye: No discharge.      Conjunctiva/sclera: Conjunctivae normal.      Pupils: Pupils are equal, round, and reactive to light.   Neck:      Thyroid: No thyromegaly.      Trachea: No tracheal deviation.   Cardiovascular:      Rate " and Rhythm: Normal rate and regular rhythm.      Heart sounds: Normal heart sounds, S1 normal and S2 normal. No murmur heard.  Pulmonary:      Effort: Pulmonary effort is normal. No respiratory distress.      Breath sounds: Normal breath sounds. No wheezing or rales.   Abdominal:      General: Bowel sounds are normal.      Palpations: Abdomen is soft. There is no mass.      Tenderness: There is no abdominal tenderness.   Musculoskeletal:         General: Normal range of motion.      Cervical back: Neck supple.   Lymphadenopathy:      Cervical: No cervical adenopathy.   Skin:     General: Skin is warm and dry.      Findings: No rash.   Neurological:      Mental Status: She is alert and oriented to person, place, and time.      Deep Tendon Reflexes: Reflexes are normal and symmetric.   Psychiatric:         Thought Content: Thought content normal.         Judgment: Judgment normal.           Recent Labs   Lab Test 02/19/21  1551      POTASSIUM 3.9   CR 0.78        Diagnostics:  Labs pending at this time.  Results will be reviewed when available.   No EKG required, no history of coronary heart disease, significant arrhythmia, peripheral arterial disease or other structural heart disease.    Revised Cardiac Risk Index (RCRI):  The patient has the following serious cardiovascular risks for perioperative complications:   - No serious cardiac risks = 0 points     RCRI Interpretation: 0 points: Class I (very low risk - 0.4% complication rate)           Signed Electronically by: Jigna Lees MD  Copy of this evaluation report is provided to requesting physician.

## 2022-04-12 NOTE — PATIENT INSTRUCTIONS
Preparing for Your Surgery  Getting started  A nurse will call you to review your health history and instructions. They will give you an arrival time based on your scheduled surgery time. Please be ready to share:    Your doctor's clinic name and phone number    Your medical, surgical and anesthesia history    A list of allergies and sensitivities    A list of medicines, including herbal treatments and over-the-counter drugs    Whether the patient has a legal guardian (ask how to send us the papers in advance)  Please tell us if you're pregnant--or if there's any chance you might be pregnant. Some surgeries may injure a fetus (unborn baby), so they require a pregnancy test. Surgeries that are safe for a fetus don't always need a test, and you can choose whether to have one.   If you have a child who's having surgery, please ask for a copy of Preparing for Your Child's Surgery.    Preparing for surgery  1. Within 30 days of surgery: Have a pre-op exam (sometimes called an H&P, or History and Physical). This can be done at a clinic or pre-operative center.  ? If you're having a , you may not need this exam. Talk to your care team.  2. At your pre-op exam, talk to your care team about all medicines you take. If you need to stop any medicines before surgery, ask when to start taking them again.  ? We do this for your safety. Many medicines can make you bleed too much during surgery. Some change how well surgery (anesthesia) drugs work.  3. Call your insurance company to let them know you're having surgery. (If you don't have insurance, call [phone].)  4. Call your clinic if there's any change in your health. This includes signs of a cold or flu (sore throat, runny nose, cough, rash, fever). It also includes a scrape or scratch near the surgery site.  5. If you have questions on the day of surgery, call your hospital or surgery center.  COVID testing  You may need to be tested for COVID-19 before having  surgery. If so, your surgical team will give you instructions for scheduling this test, separate from your preoperative history and physical.  Eating and drinking guidelines  For your safety: Unless your surgeon tells you otherwise, follow the guidelines below.    Eat and drink as usual until 8 hours before surgery. After that, no food or milk.    Drink clear liquids until 2 hours before surgery. These are liquids you can see through, like water, Gatorade and Propel Water. You may also have black coffee and tea (no cream or milk).    Nothing by mouth within 2 hours of surgery. This includes gum, candy and breath mints.    If you drink alcohol: Stop drinking it the night before surgery.    If your care team tells you to take medicine on the morning of surgery, it's okay to take it with a sip of water.  Preventing infection  1. Shower or bathe the night before and morning of your surgery. Follow the instructions your clinic gave you. (If no instructions, use regular soap.)  2. Don't shave or clip hair near your surgery site. We'll remove the hair if needed.  3. Don't smoke or vape the morning of surgery. You may chew nicotine gum up to 2 hours before surgery. A nicotine patch is okay.  ? Note: Some surgeries require you to completely quit smoking and nicotine. Check with your surgeon.  4. Your care team will make every effort to keep you safe from infection. We will:  ? Clean our hands often with soap and water (or an alcohol-based hand rub).  ? Clean the skin at your surgery site with a special soap that kills germs.  ? Give you a special gown to keep you warm. (Cold raises the risk of infection.)  ? Wear special hair covers, masks, gowns and gloves during surgery.  ? Give antibiotic medicine, if prescribed. Not all surgeries need antibiotics.  What to bring on the day of surgery  1. Photo ID and insurance card  2. Copy of your health care directive, if you have one  3. Glasses and hearing aides (bring cases)  ? You  can't wear contacts during surgery  4. Inhaler and eye drops, if you use them (tell us about these when you arrive)  5. CPAP machine or breathing device, if you use them  6. A few personal items, if spending the night  7. If you have . . .  ? A pacemaker, ICD (cardiac defibrillator) or other implant: Bring the ID card.  ? An implanted stimulator: Bring the remote control.  ? A legal guardian: Bring a copy of the certified (court-stamped) guardianship papers.  Please remove any jewelry, including body piercings. Leave jewelry and other valuables at home.  If you're going home the day of surgery    You must have a responsible adult drive you home. They should stay with you overnight as well.    If you don't have someone to stay with you, and you aren't safe to go home alone, we may keep you overnight. Insurance often won't pay for this.  After surgery  If it's hard to control your pain or you need more pain medicine, please call your surgeon's office.  Questions?   If you have any questions for your care team, list them here: _________________________________________________________________________________________________________________________________________________________________________ ____________________________________ ____________________________________ ____________________________________  For informational purposes only. Not to replace the advice of your health care provider. Copyright   2003, 2019 [place] [employer] [facility] [place]. All rights reserved. Clinically reviewed by Staci Geronimo MD. [employer] [id number] - REV 07/21.    How to Take Your Medication Before Surgery  - HOLD (do not take) meloxicam for 7 days before surgery  - STOP taking all vitamins and herbal supplements 14 days before surgery.  Preventive Health Recommendations    See your health care provider every year to    Review health changes.     Discuss preventive care.      Review your medicines if your doctor has prescribed any.      You no longer  need a yearly Pap test unless you've had an abnormal Pap test in the past 10 years. If you have vaginal symptoms, such as bleeding or discharge, be sure to talk with your provider about a Pap test.      Every 1 to 2 years, have a mammogram.  If you are over 69, talk with your health care provider about whether or not you want to continue having screening mammograms.      Every 10 years, have a colonoscopy. Or, have a yearly FIT test (stool test). These exams will check for colon cancer.       Have a cholesterol test every 5 years, or more often if your doctor advises it.       Have a diabetes test (fasting glucose) every three years. If you are at risk for diabetes, you should have this test more often.       At age 65, have a bone density scan (DEXA) to check for osteoporosis (brittle bone disease).    Shots:    Get a flu shot each year.    Get a tetanus shot every 10 years.    Talk to your doctor about your pneumonia vaccines. There are now two you should receive - Pneumovax (PPSV 23) and Prevnar (PCV 13).    Talk to your pharmacist about the shingles vaccine.    Talk to your doctor about the hepatitis B vaccine.    Nutrition:     Eat at least 5 servings of fruits and vegetables each day.      Eat whole-grain bread, whole-wheat pasta and brown rice instead of white grains and rice.      Get adequate about Calcium and Vitamin D.     Lifestyle    Exercise at least 150 minutes a week (30 minutes a day, 5 days a week). This will help you control your weight and prevent disease.      Limit alcohol to one drink per day.      No smoking.       Wear sunscreen to prevent skin cancer.       See your dentist twice a year for an exam and cleaning.      See your eye doctor every 1 to 2 years to screen for conditions such as glaucoma, macular degeneration, cataracts, etc.    Personalized Prevention Plan  You are due for the preventive services outlined below.  Your care team is available to assist you in scheduling these  services.  If you have already completed any of these items, please share that information with your care team to update in your medical record.    Health Maintenance Due   Topic Date Due     LUNG CANCER SCREENING  Never done     Basic Metabolic Panel  02/19/2022     FALL RISK ASSESSMENT  02/19/2022

## 2022-04-12 NOTE — PROGRESS NOTES
"   SUBJECTIVE:   CC: Christiana Lopes is an 66 year old woman who presents for preventive health visit.       Patient has been advised of split billing requirements and indicates understanding: Yes  Healthy Habits:    In general, how would you rate your overall health?  Very good    Frequency of exercise:  2-3 days/week    Duration of exercise:  45-60 minutes    Do you usually eat at least 4 servings of fruit and vegetables a day, include whole grains    & fiber and avoid regularly eating high fat or \"junk\" foods?  Yes    Taking medications regularly:  Yes    Barriers to taking medications:  Not applicable    Medication side effects:  None    Ability to successfully perform activities of daily living:  No assistance needed    Home Safety:  No safety concerns identified    Hearing Impairment:  Feel that people are mumbling or not speaking clearly and need to ask people to speak up or repeat themselves    In the past 6 months, have you been bothered by leaking of urine? Yes (wants to see Urology)    In general, how would you rate your overall mental or emotional health?  Excellent      PHQ-2 Total Score:    Additional concerns today:  Yes (pre-op)      Today's PHQ-2 Score:   PHQ-2 (  [employer]) 2022   Q1: Little interest or pleasure in doing things 0   Q2: Feeling down, depressed or hopeless 0   PHQ-2 Score 0   PHQ-2 Total Score (12-17 Years)- Positive if 3 or more points; Administer PHQ-A if positive -   Q1: Little interest or pleasure in doing things Not at all   Q2: Feeling down, depressed or hopeless Not at all   PHQ-2 Score 0       Abuse: Current or Past (Physical, Sexual or Emotional) - No  Do you feel safe in your environment? Yes        Social History     Tobacco Use     Smoking status: Former Smoker     Packs/day: 1.00     Years: 2.00     Pack years: 2.00     Types: Cigarettes     Quit date: 2010     Years since quittin.7     Smokeless tobacco: Never Used     Tobacco comment: no use of tobacco " products   Substance Use Topics     Alcohol use: Yes     Comment: socially         Alcohol Use 2/2/2022   Prescreen: >3 drinks/day or >7 drinks/week? No   Prescreen: >3 drinks/day or >7 drinks/week? -       Reviewed orders with patient.  Reviewed health maintenance and updated orders accordingly - Yes      Breast Cancer Screening:  Any new diagnosis of family breast, ovarian, or bowel cancer? No    Breast CA Risk Assessment (FHS-7) 2/19/2021   Do you have a family history of breast, colon, or ovarian cancer? No / Unknown       Mammogram Screening: Recommended mammography every 1-2 years with patient discussion and risk factor consideration  Pertinent mammograms are reviewed under the imaging tab.    History of abnormal Pap smear: NO - age 65 - see link Cervical Cytology Screening Guidelines  PAP / HPV Latest Ref Rng & Units 11/8/2016 6/26/2013 8/25/2011   PAP (Historical) - NIL NIL NIL   HPV16 NEG Negative - -   HPV18 NEG Negative - -   HRHPV NEG Negative - -     Reviewed and updated as needed this visit by clinical staff   Tobacco  Allergies  Meds  Problems  Med Hx  Surg Hx  Fam Hx  Soc   Hx          Reviewed and updated as needed this visit by Provider   Tobacco  Allergies   Problems  Med Hx  Surg Hx  Fam Hx               Review of Systems  CONSTITUTIONAL: NEGATIVE for fever, chills, change in weight  INTEGUMENTARY/SKIN: NEGATIVE for worrisome rashes, moles or lesions  EYES:  Bilateral upper eyelid ptosis  ENT: NEGATIVE for ear, mouth and throat problems  RESP: NEGATIVE for significant cough or SOB  BREAST: NEGATIVE for masses, tenderness or discharge  CV: NEGATIVE for chest pain, palpitations or peripheral edema  GI: NEGATIVE for nausea, abdominal pain, heartburn, or change in bowel habits  : NEGATIVE for unusual urinary or vaginal symptoms. No vaginal bleeding.  MUSCULOSKELETAL: NEGATIVE for significant arthralgias or myalgia  NEURO: NEGATIVE for weakness, dizziness or paresthesias  PSYCHIATRIC:  "NEGATIVE for changes in mood or affect      OBJECTIVE:   /64   Pulse 52   Temp 98.3  F (36.8  C) (Temporal)   Resp 20   Ht 1.549 m (5' 1\")   Wt 62.6 kg (138 lb)   SpO2 99%   BMI 26.07 kg/m    Physical Exam  Constitutional:       General: She is not in acute distress.     Appearance: She is well-developed.   HENT:      Right Ear: Tympanic membrane and external ear normal.      Left Ear: Tympanic membrane and external ear normal.      Nose: Nose normal.   Eyes:      General:         Right eye: No discharge.         Left eye: No discharge.      Conjunctiva/sclera: Conjunctivae normal.      Pupils: Pupils are equal, round, and reactive to light.   Neck:      Thyroid: No thyromegaly.      Trachea: No tracheal deviation.   Cardiovascular:      Rate and Rhythm: Normal rate and regular rhythm.      Heart sounds: Normal heart sounds, S1 normal and S2 normal. No murmur heard.  Pulmonary:      Effort: Pulmonary effort is normal. No respiratory distress.      Breath sounds: Normal breath sounds. No wheezing or rales.   Abdominal:      General: Bowel sounds are normal.      Palpations: Abdomen is soft. There is no mass.      Tenderness: There is no abdominal tenderness.   Musculoskeletal:         General: Normal range of motion.      Cervical back: Neck supple.   Lymphadenopathy:      Cervical: No cervical adenopathy.   Skin:     General: Skin is warm and dry.      Findings: No rash.   Neurological:      Mental Status: She is alert and oriented to person, place, and time.      Deep Tendon Reflexes: Reflexes are normal and symmetric.   Psychiatric:         Thought Content: Thought content normal.         Judgment: Judgment normal.            ASSESSMENT/PLAN:   (Z00.00) Routine general medical examination at a health care facility  (primary encounter diagnosis)    (I10) Hypertension goal BP (blood pressure) < 140/90  Comment: Well-controlled.  Labs drawn.  Refills given.  Plan: BASIC METABOLIC PANEL, potassium " "chloride ER         (K-TAB) 20 MEQ CR tablet,         atenolol-chlorthalidone (TENORETIC) 50-25 MG         tablet, Lipid panel reflex to direct LDL         Fasting, CBC with platelets          (N95.1) Menopausal syndrome (hot flashes)  Comment: She is gone to using her estradiol 1 mg every other day but still has some breakthrough hot flashes.  Will try taking 0.5 mg daily.  Plan: estradiol (ESTRACE) 0.5 MG tablet          (M19.012) Primary osteoarthritis of left shoulder  Comment: Feels meloxicam is helpful but sometimes needs more pain relief.  We discussed using Tylenol.  Plan: meloxicam (MOBIC) 15 MG tablet          (H91.90) Hearing difficulty, unspecified laterality  Comment: She would like to see audiology.  Plan: Adult Audiology Referral          (R32) Urinary incontinence, unspecified type  Comment: She was referred to urology last year but did not make an appointment.  She would like to have a consult this year.  Plan: Adult Urology Referral          Patient has been advised of split billing requirements and indicates understanding: Yes    COUNSELING:  Reviewed preventive health counseling, as reflected in patient instructions    Estimated body mass index is 26.07 kg/m  as calculated from the following:    Height as of this encounter: 1.549 m (5' 1\").    Weight as of this encounter: 62.6 kg (138 lb).      She reports that she quit smoking about 11 years ago. Her smoking use included cigarettes. She has a 2.00 pack-year smoking history. She has never used smokeless tobacco.      Counseling Resources:  ATP IV Guidelines  Pooled Cohorts Equation Calculator  Breast Cancer Risk Calculator  BRCA-Related Cancer Risk Assessment: FHS-7 Tool  FRAX Risk Assessment  ICSI Preventive Guidelines  Dietary Guidelines for Americans, 2010  USDA's MyPlate  ASA Prophylaxis  Lung CA Screening    Jigna Lees MD  [facility]"

## 2022-04-14 ENCOUNTER — LAB (OUTPATIENT)
Dept: LAB | Facility: OTHER | Age: 67
End: 2022-04-14
Payer: COMMERCIAL

## 2022-04-14 ENCOUNTER — ANESTHESIA EVENT (OUTPATIENT)
Dept: SURGERY | Facility: AMBULATORY SURGERY CENTER | Age: 67
End: 2022-04-14
Payer: COMMERCIAL

## 2022-04-14 DIAGNOSIS — Z11.59 ENCOUNTER FOR SCREENING FOR OTHER VIRAL DISEASES: ICD-10-CM

## 2022-04-14 PROCEDURE — U0005 INFEC AGEN DETEC AMPLI PROBE: HCPCS

## 2022-04-14 PROCEDURE — U0003 INFECTIOUS AGENT DETECTION BY NUCLEIC ACID (DNA OR RNA); SEVERE ACUTE RESPIRATORY SYNDROME CORONAVIRUS 2 (SARS-COV-2) (CORONAVIRUS DISEASE [COVID-19]), AMPLIFIED PROBE TECHNIQUE, MAKING USE OF HIGH THROUGHPUT TECHNOLOGIES AS DESCRIBED BY CMS-2020-01-R: HCPCS

## 2022-04-14 NOTE — ANESTHESIA PREPROCEDURE EVALUATION
Anesthesia Pre-Procedure Evaluation    Patient: Christiana Lopes   MRN: [id number] : 1955        Procedure : Procedure(s):  BLEPHAROPLASTY          Past Medical History:   Diagnosis Date     Arthritis     ostio     Cervix cancer (H)      Depression     after 's death     History of left breast biopsy     negative     HTN      UTI     history of       Past Surgical History:   Procedure Laterality Date     ARTHROPLASTY SHOULDER Left 2020    Procedure: Left shoulder hemiarthroplasy with bicep tenodesis;  Surgeon: Nura Cole MD;  Location: [facility] OR     ARTHROSCOPY SHOULDER Left 2020    Procedure: left shoulder arthroscopy;  Surgeon: Nura Cole MD;  Location: [facility] OR     BACK SURGERY       BIOPSY BREAST      benign     C/SECTION, CLASSICAL      x2     COLONOSCOPY       COLONOSCOPY N/A 2018    Procedure: COLONOSCOPY;  COLONOSCOPY;  Surgeon: Ken Fair MD;  Location:  GI     HEAD & NECK SURGERY       HERNIA REPAIR       HYSTERECTOMY      one ovary removed, h/o cervical cancer     ZZC APPENDECTOMY       ZZC ENLARGE BREAST       ZZC NONSPECIFIC PROCEDURE  2-04    ventral hernia repair     ZZC NONSPECIFIC PROCEDURE  11    Lumbar fusion L5-S1     ZZC NONSPECIFIC PROCEDURE      cervical fusion C3      No Known Allergies   Social History     Tobacco Use     Smoking status: Former Smoker     Packs/day: 1.00     Years: 2.00     Pack years: 2.00     Types: Cigarettes     Quit date: 2010     Years since quittin.7     Smokeless tobacco: Never Used     Tobacco comment: no use of tobacco products   Substance Use Topics     Alcohol use: Yes     Comment: socially      Wt Readings from Last 1 Encounters:   22 62.6 kg (138 lb)           Physical Exam    Airway        Mallampati: II   TM distance: > 3 FB   Neck ROM: full   Mouth opening: > 3 cm    Respiratory Devices and Support         Dental  no notable dental history          Cardiovascular   cardiovascular exam normal          Pulmonary   pulmonary exam normal                OUTSIDE LABS:  CBC:   Lab Results   Component Value Date    WBC 5.2 04/12/2022    WBC 6.4 02/04/2020    HGB 11.6 (L) 04/12/2022    HGB 12.0 02/04/2020    HCT 35.5 04/12/2022    HCT 35.8 02/04/2020     04/12/2022     02/04/2020     BMP:   Lab Results   Component Value Date     04/12/2022     02/19/2021    POTASSIUM 4.2 04/12/2022    POTASSIUM 3.9 02/19/2021    CHLORIDE 108 04/12/2022    CHLORIDE 106 02/19/2021    CO2 26 04/12/2022    CO2 28 02/19/2021    BUN 22 04/12/2022    BUN 25 02/19/2021    CR 0.92 04/12/2022    CR 0.78 02/19/2021    GLC 98 04/12/2022    GLC 77 02/19/2021     COAGS: No results found for: PTT, INR, FIBR  POC: No results found for: BGM, HCG, HCGS  HEPATIC:   Lab Results   Component Value Date    ALBUMIN 4.8 10/27/2009     OTHER:   Lab Results   Component Value Date    JERRI 9.0 04/12/2022    PHOS 3.4 10/27/2009    CRP <5.0 07/08/2014    SED 9 07/08/2014       Anesthesia Plan    ASA Status:  3   NPO Status:  NPO Appropriate    Anesthesia Type: MAC.     - Reason for MAC: straight local not clinically adequate   Induction: Intravenous, Propofol.   Maintenance: TIVA.        Consents    Anesthesia Plan(s) and associated risks, benefits, and realistic alternatives discussed. Questions answered and patient/representative(s) expressed understanding.    - Discussed:     - Discussed with:  Patient      - Extended Intubation/Ventilatory Support Discussed: No.      - Patient is DNR/DNI Status: No    Use of blood products discussed: No .     Postoperative Care    Pain management: IV analgesics, Oral pain medications, Multi-modal analgesia.   PONV prophylaxis: Dexamethasone or Solumedrol, Ondansetron (or other 5HT-3), Background Propofol Infusion     Comments:                Jj Tucker MD

## 2022-04-15 LAB — SARS-COV-2 RNA RESP QL NAA+PROBE: NEGATIVE

## 2022-04-18 ENCOUNTER — ANESTHESIA (OUTPATIENT)
Dept: SURGERY | Facility: AMBULATORY SURGERY CENTER | Age: 67
End: 2022-04-18
Payer: COMMERCIAL

## 2022-04-18 ENCOUNTER — HOSPITAL ENCOUNTER (OUTPATIENT)
Facility: AMBULATORY SURGERY CENTER | Age: 67
Discharge: HOME | End: 2022-04-18
Attending: OPHTHALMOLOGY | Admitting: OPHTHALMOLOGY
Payer: COMMERCIAL

## 2022-04-18 VITALS
WEIGHT: 138 LBS | OXYGEN SATURATION: 96 % | HEART RATE: 55 BPM | BODY MASS INDEX: 26.06 KG/M2 | DIASTOLIC BLOOD PRESSURE: 59 MMHG | RESPIRATION RATE: 16 BRPM | HEIGHT: 61 IN | SYSTOLIC BLOOD PRESSURE: 128 MMHG | TEMPERATURE: 97.4 F

## 2022-04-18 DIAGNOSIS — H02.831 DERMATOCHALASIS OF BOTH UPPER EYELIDS: ICD-10-CM

## 2022-04-18 DIAGNOSIS — H02.834 DERMATOCHALASIS OF BOTH UPPER EYELIDS: ICD-10-CM

## 2022-04-18 PROCEDURE — G8918 PT W/O PREOP ORDER IV AB PRO: HCPCS

## 2022-04-18 PROCEDURE — 15823 BLEPHARP UPR EYELID XCSV SKN: CPT | Mod: 50

## 2022-04-18 PROCEDURE — G8907 PT DOC NO EVENTS ON DISCHARG: HCPCS

## 2022-04-18 PROCEDURE — 15823 BLEPHARP UPR EYELID XCSV SKN: CPT | Mod: 50 | Performed by: OPHTHALMOLOGY

## 2022-04-18 RX ORDER — OXYCODONE HYDROCHLORIDE 5 MG/1
5 TABLET ORAL EVERY 4 HOURS PRN
Status: DISCONTINUED | OUTPATIENT
Start: 2022-04-18 | End: 2022-04-19 | Stop reason: HOSPADM

## 2022-04-18 RX ORDER — SODIUM CHLORIDE, SODIUM LACTATE, POTASSIUM CHLORIDE, CALCIUM CHLORIDE 600; 310; 30; 20 MG/100ML; MG/100ML; MG/100ML; MG/100ML
INJECTION, SOLUTION INTRAVENOUS CONTINUOUS
Status: DISCONTINUED | OUTPATIENT
Start: 2022-04-18 | End: 2022-04-19 | Stop reason: HOSPADM

## 2022-04-18 RX ORDER — ERYTHROMYCIN 5 MG/G
OINTMENT OPHTHALMIC
Qty: 3.5 G | Refills: 0 | Status: SHIPPED | OUTPATIENT
Start: 2022-04-18 | End: 2023-04-14

## 2022-04-18 RX ORDER — ERYTHROMYCIN 5 MG/G
OINTMENT OPHTHALMIC PRN
Status: DISCONTINUED | OUTPATIENT
Start: 2022-04-18 | End: 2022-04-18 | Stop reason: HOSPADM

## 2022-04-18 RX ORDER — PROPOFOL 10 MG/ML
INJECTION, EMULSION INTRAVENOUS PRN
Status: DISCONTINUED | OUTPATIENT
Start: 2022-04-18 | End: 2022-04-18

## 2022-04-18 RX ORDER — ACETAMINOPHEN 325 MG/1
975 TABLET ORAL ONCE
Status: COMPLETED | OUTPATIENT
Start: 2022-04-18 | End: 2022-04-18

## 2022-04-18 RX ORDER — ONDANSETRON 2 MG/ML
4 INJECTION INTRAMUSCULAR; INTRAVENOUS EVERY 30 MIN PRN
Status: DISCONTINUED | OUTPATIENT
Start: 2022-04-18 | End: 2022-04-19 | Stop reason: HOSPADM

## 2022-04-18 RX ORDER — PROPOFOL 10 MG/ML
INJECTION, EMULSION INTRAVENOUS CONTINUOUS PRN
Status: DISCONTINUED | OUTPATIENT
Start: 2022-04-18 | End: 2022-04-18

## 2022-04-18 RX ORDER — FENTANYL CITRATE 50 UG/ML
25 INJECTION, SOLUTION INTRAMUSCULAR; INTRAVENOUS EVERY 5 MIN PRN
Status: DISCONTINUED | OUTPATIENT
Start: 2022-04-18 | End: 2022-04-19 | Stop reason: HOSPADM

## 2022-04-18 RX ORDER — TETRACAINE HYDROCHLORIDE 5 MG/ML
SOLUTION OPHTHALMIC PRN
Status: DISCONTINUED | OUTPATIENT
Start: 2022-04-18 | End: 2022-04-18 | Stop reason: HOSPADM

## 2022-04-18 RX ORDER — LIDOCAINE HYDROCHLORIDE 20 MG/ML
INJECTION, SOLUTION INFILTRATION; PERINEURAL PRN
Status: DISCONTINUED | OUTPATIENT
Start: 2022-04-18 | End: 2022-04-18

## 2022-04-18 RX ORDER — MEPERIDINE HYDROCHLORIDE 25 MG/ML
12.5 INJECTION INTRAMUSCULAR; INTRAVENOUS; SUBCUTANEOUS
Status: DISCONTINUED | OUTPATIENT
Start: 2022-04-18 | End: 2022-04-19 | Stop reason: HOSPADM

## 2022-04-18 RX ORDER — ACETAMINOPHEN 500 MG
500-1000 TABLET ORAL EVERY 6 HOURS PRN
COMMUNITY
End: 2023-04-14

## 2022-04-18 RX ORDER — ONDANSETRON 4 MG/1
4 TABLET, ORALLY DISINTEGRATING ORAL EVERY 30 MIN PRN
Status: DISCONTINUED | OUTPATIENT
Start: 2022-04-18 | End: 2022-04-19 | Stop reason: HOSPADM

## 2022-04-18 RX ORDER — FENTANYL CITRATE 50 UG/ML
25 INJECTION, SOLUTION INTRAMUSCULAR; INTRAVENOUS
Status: DISCONTINUED | OUTPATIENT
Start: 2022-04-18 | End: 2022-04-19 | Stop reason: HOSPADM

## 2022-04-18 RX ADMIN — LIDOCAINE HYDROCHLORIDE 60 MG: 20 INJECTION, SOLUTION INFILTRATION; PERINEURAL at 12:51

## 2022-04-18 RX ADMIN — PROPOFOL 150 MCG/KG/MIN: 10 INJECTION, EMULSION INTRAVENOUS at 12:55

## 2022-04-18 RX ADMIN — PROPOFOL 50 MG: 10 INJECTION, EMULSION INTRAVENOUS at 12:55

## 2022-04-18 RX ADMIN — ACETAMINOPHEN 975 MG: 325 TABLET ORAL at 11:59

## 2022-04-18 RX ADMIN — SODIUM CHLORIDE, SODIUM LACTATE, POTASSIUM CHLORIDE, CALCIUM CHLORIDE: 600; 310; 30; 20 INJECTION, SOLUTION INTRAVENOUS at 11:57

## 2022-04-18 NOTE — ANESTHESIA CARE TRANSFER NOTE
Patient: Christiana Lopes    Procedure: Procedure(s):  BLEPHAROPLASTY       Diagnosis: Dermatochalasis of both upper eyelids [H02.831, H02.834]  Diagnosis Additional Information: No value filed.    Anesthesia Type:   MAC     Note:      Level of Consciousness: awake  Oxygen Supplementation: room air    Independent Airway: airway patency satisfactory and stable  Dentition: dentition unchanged  Vital Signs Stable: post-procedure vital signs reviewed and stable  Report to RN Given: handoff report given  Patient transferred to: Phase II    Handoff Report: Identifed the Patient, Identified the Reponsible Provider, Reviewed the pertinent medical history, Discussed the surgical course, Reviewed Intra-OP anesthesia mangement and issues during anesthesia, Set expectations for post-procedure period and Allowed opportunity for questions and acknowledgement of understanding      Vitals:  Vitals Value Taken Time   /45 04/18/22 1328   Temp 96.6  F (35.9  C) 04/18/22 1328   Pulse     Resp 16 04/18/22 1328   SpO2 94 % 04/18/22 1328       Electronically Signed By: LOC Hull CRNA  April 18, 2022  1:31 PM

## 2022-04-18 NOTE — ANESTHESIA POSTPROCEDURE EVALUATION
Patient: Christiana Lopes    Procedure: Procedure(s):  BLEPHAROPLASTY       Anesthesia Type:  MAC    Note:  Disposition: Outpatient   Postop Pain Control: Uneventful            Sign Out: Well controlled pain   PONV:    Neuro/Psych: Uneventful            Sign Out: Acceptable/Baseline neuro status   Airway/Respiratory: Uneventful            Sign Out: Acceptable/Baseline resp. status   CV/Hemodynamics: Uneventful            Sign Out: Acceptable CV status; No obvious hypovolemia; No obvious fluid overload   Other NRE:    DID A NON-ROUTINE EVENT OCCUR?            Last vitals:  Vitals Value Taken Time   /45 04/18/22 1328   Temp 96.6  F (35.9  C) 04/18/22 1328   Pulse     Resp 16 04/18/22 1328   SpO2 94 % 04/18/22 1328       Electronically Signed By: Jj Tucker MD  April 18, 2022  1:39 PM

## 2022-04-18 NOTE — OP NOTE
PREOPERATIVE DIAGNOSES:   1. Bilateral upper eyelid dermatochalasis.     POSTOPERATIVE DIAGNOSES:   1. Bilateral upper eyelid dermatochalasis.     PROCEDURE PERFORMED:   1. Bilateral upper eyelid blepharoplasty 95080       SURGEON: Lico Ma MD    ANESTHESIA: Monitored with local infiltration, 50/50 mixture of 2% lidocaine with epinephrine and 0.5% Marcaine.     COMPLICATIONS: None.     ESTIMATED BLOOD LOSS: Less than 5 mL.     HISTORY: Christiana Lopes  presented with upper lid drooping interfering with superior visual field and activities of daily living. After the risks, benefits and alternatives to the proposed procedure were explained, informed consent was obtained.     DESCRIPTION OF PROCEDURE: Christiana Lopes  was brought to the operating room and placed supine on the operating table. IV sedation was given. The upper lid crease and excess upper eyelid skin was marked on each upper eyelid, and the eyelids infiltrated with local anesthetic. The area was prepped and draped in the typical sterile fashion for oculoplastic surgery. Attention was directed to the right side. Skin was incised following marked lines. Skin flap was excised with high cautery. Dissection was then carried into the suborbicularis plane superiorly over the orbital rim.  A 5-0 Monocryl suture was passed through the brow at the marking made at the inferior brow hairs.  This suture was then passed through the frontal periosteum 1 cm above the orbital rim nicely securing the tail of the brow in an improved position.  It was then passed through the orbicularis in line with the marking stitch. The suture was pulled through and tied in a permanent fashion. The  upper eyelid was closed with running 6-0 plain gut suture.  Attention was directed to the left side where the same procedure was performed. Antibiotic ointment was applied. The patient tolerated the procedure well. She was taken to the recovery room in stable condition.    Lico  MD Anil

## 2022-04-18 NOTE — INTERVAL H&P NOTE
"I have reviewed the surgical (or preoperative) H&P that is linked to this encounter, and examined the patient. There are no significant changes    Clinical Conditions Present on Arrival:  Clinically Significant Risk Factors Present on Admission                   # Overweight: Estimated body mass index is 26.09 kg/m  as calculated from the following:    Height as of this encounter: 1.549 m (5' 0.98\").    Weight as of this encounter: 62.6 kg (138 lb).       "

## 2022-04-18 NOTE — DISCHARGE INSTRUCTIONS
You had 975 mg of Tylenol at 12:00 PM. You may repeat this after 6 PM. Maximum amount of Tylenol/Acetaminophen in a 24 hour period is 4,000 mg.    Post-operative Instructions  Ophthalmic Plastic and Reconstructive Surgery    Lico Ma M.D.     All instructions apply to the operated eye(s) or eyelid(s).    Wound care and personal care  ? Apply ice compresses 15 minutes of every hour while awake for 2 days. As long as there is no further bleeding, after two days, switch to warm water compresses for five minutes, four times a day until seen by your physician.   ? You may shower or wash your hair the day after surgery. Do not go swimming for at least 2 weeks to prevent contamination of your wounds.  ? You may go for walks and light activity is ok, but no heavy (over 15 pounds) lifting, bending or excessive straining for one week.   ? Do not apply make-up to the eyes or eyelids for 2 weeks after surgery.  ? Expect some swelling, bruising, black eye (even into the lower eyelids and cheeks). Also expect serum caking, crusting and discharge from the eye and/or incisions. A small amount of surface bleeding, and depending on the type of surgery, bleeding from the inside of the eyelid, is normal for the first 48 hours.  ? Avoid straining, bending at the waist, or lifting more than 15 pounds for 1 week. Sleeping with your head elevated, such as in a recliner, for the first several days can help swelling resolve more quickly.   ? Do continue to ambulate (walk) as you normally would - being sedentary after surgery can cause blood clots.   ? Your eye(s) and eyelid(s) may be painful and tender. This is normal after surgery.      Contact information and follow-up  ? Return to the Eye Clinic for a follow-up appointment with your physician as scheduled. If no appointment has been scheduled:   - [facility] eye clinic: [phone] for an appointment with Dr. Ma within 2 to 3 weeks from your date of  surgery.      -  [facility] eye clinic: [phone] for an appointment with Dr. Ma within 2 to 3 weeks from your date of surgery.     ? For severe pain, bleeding, or loss of vision, call the [facility] at [phone] or [facility] at [phone].     After hours or on weekends and holidays, call [phone] and ask to speak with the ophthalmologist on call.    An on call person can be reached after hours for concerns. The on call doctor should not call in medication refill requests after hours or on weekends, so please plan accordingly. An effort has been made to provide adequate pain medications following every surgery, and refills will not be provided in most instances.     Medications  ? Restart all regular home medications and eye drops.   ? Avoid aspirin and aspirin-like medications (Motrin, Aleve, Ibuprofen, [place] etc) for 48 hours to reduce the risk of bleeding. You may take Tylenol (acetaminophen) for pain.  ? In addition to your home medications, take the following post-operative medications as prescribed by your physician.    ? Apply prescribed antibiotic ointment to all sutures three times a day, and into the operated eye(s) at night. Once you run out, you can apply Vaseline or Aquaphor to the incision at bedtime. Do NOT put Vaseline or Aquaphor into the eye.  [facility]  Same-Day Surgery   Adult Discharge Orders & Instructions   For 24 hours after surgery  Get plenty of rest.  A responsible adult must stay with you for at least 24 hours after you leave the hospital.   Do not drive or use heavy equipment.  If you have weakness or tingling, don't drive or use heavy equipment until this feeling goes away.  Do not drink alcohol.  Avoid strenuous or risky activities.  Ask for help when climbing stairs.   You may feel lightheaded.  IF so, sit for a few minutes before standing.  Have someone help you get up.   If you  have nausea (feel sick to your stomach): Drink only clear liquids such as apple juice, ginger ale, broth or 7-Up.  Rest may also help.  Be sure to drink enough fluids.  Move to a regular diet as you feel able.  You may have a slight fever. Call the doctor if your fever is over 100 F (37.7 C) (taken under the tongue) or lasts longer than 24 hours.  You may have a dry mouth, a sore throat, muscle aches or trouble sleeping.  These should go away after 24 hours.  Do not make important or legal decisions.   Call your doctor for any of the followin.  Signs of infection (fever, growing tenderness at the surgery site, a large amount of drainage or bleeding, severe pain, foul-smelling drainage, redness, swelling).  2. It has been over 8 to 10 hours since surgery and you are still not able to urinate (pass water).  To contact a doctor, call Dr Lico Ma's office at [phone]

## 2022-05-04 ENCOUNTER — OFFICE VISIT (OUTPATIENT)
Dept: OPHTHALMOLOGY | Facility: CLINIC | Age: 67
End: 2022-05-04
Payer: COMMERCIAL

## 2022-05-04 DIAGNOSIS — H02.834 DERMATOCHALASIS OF BOTH UPPER EYELIDS: Primary | ICD-10-CM

## 2022-05-04 DIAGNOSIS — H02.831 DERMATOCHALASIS OF BOTH UPPER EYELIDS: Primary | ICD-10-CM

## 2022-05-04 PROCEDURE — 99024 POSTOP FOLLOW-UP VISIT: CPT | Performed by: OPHTHALMOLOGY

## 2022-05-04 ASSESSMENT — TONOMETRY
IOP_METHOD: ICARE
OD_IOP_MMHG: 12
OS_IOP_MMHG: 13

## 2022-05-04 ASSESSMENT — VISUAL ACUITY
OD_SC+: -1
OD_SC: 20/30
METHOD: SNELLEN - LINEAR
OS_SC+: -2
OS_SC: 20/60

## 2022-05-04 ASSESSMENT — CONF VISUAL FIELD
OS_NORMAL: 1
METHOD: COUNTING FINGERS
OD_NORMAL: 1

## 2022-05-04 NOTE — PROGRESS NOTES
Chief Complaint(s) and History of Present Illness(es)     Post Op (Ophthalmology) Both Eyes     Laterality: both eyes              Comments     S/P Bilateral upper eyelid blepharoplasty, 04/18/2022. Patient very happy   with the results, noticing improved vision right away. Things healing   well. No concerns.               Doing well. Happy with outcome. Notices significant improvement in peripheral vision with driving, walking, etc...   F/u as needed.    Attending Physician Attestation: Complete documentation of historical and exam elements from today's encounter can be found in the full encounter summary report (not reduplicated in this progress note). I personally obtained the chief complaint(s) and history of present illness. I confirmed and edited as necessary the review of systems, past medical/surgical history, family history, social history, and examination findings as documented by others; and I examined the patient myself. I personally reviewed the relevant tests, images, and reports as documented above. I formulated and edited as necessary the assessment and plan and discussed the findings and management plan with the patient and family. I personally reviewed the ophthalmic test(s) associated with this encounter, agree with the interpretation(s) as documented by the resident/fellow, and have edited the corresponding report(s) as necessary. Lico Ma MD

## 2022-05-04 NOTE — NURSING NOTE
Chief Complaints and History of Present Illnesses   Patient presents with     Post Op (Ophthalmology) Both Eyes       Chief Complaint(s) and History of Present Illness(es)     Post Op (Ophthalmology) Both Eyes     Laterality: both eyes              Comments     S/P Bilateral upper eyelid blepharoplasty, 04/18/2022. Patient very happy with the results, noticing improved vision right away. Things healing well. No concerns.                 Kannan Lopez, Ophthalmic Assistant

## 2022-05-18 ENCOUNTER — OFFICE VISIT (OUTPATIENT)
Dept: OPHTHALMOLOGY | Facility: CLINIC | Age: 67
End: 2022-05-18
Payer: COMMERCIAL

## 2022-05-18 DIAGNOSIS — H02.831 DERMATOCHALASIS OF BOTH UPPER EYELIDS: ICD-10-CM

## 2022-05-18 DIAGNOSIS — H02.834 DERMATOCHALASIS OF BOTH UPPER EYELIDS: ICD-10-CM

## 2022-05-18 DIAGNOSIS — H43.392 VITREOUS SYNERESIS, LEFT: ICD-10-CM

## 2022-05-18 DIAGNOSIS — H25.813 COMBINED FORMS OF AGE-RELATED CATARACT OF BOTH EYES: Primary | ICD-10-CM

## 2022-05-18 PROCEDURE — 92014 COMPRE OPH EXAM EST PT 1/>: CPT | Mod: 24 | Performed by: STUDENT IN AN ORGANIZED HEALTH CARE EDUCATION/TRAINING PROGRAM

## 2022-05-18 ASSESSMENT — REFRACTION_MANIFEST
OD_AXIS: 180
OS_ADD: +2.25
OS_SPHERE: -1.25
OD_SPHERE: -0.75
OD_ADD: +2.25
OD_CYLINDER: +1.50
OS_CYLINDER: +1.75
OS_AXIS: 164

## 2022-05-18 ASSESSMENT — VISUAL ACUITY
OS_PH_SC+: -1
OS_SC+: -1
OS_PH_SC: 20/40
METHOD: SNELLEN - LINEAR
OD_SC: 20/25
OD_SC+: -3
OS_SC: 20/50

## 2022-05-18 ASSESSMENT — REFRACTION_WEARINGRX
OS_SPHERE: +1.75
SPECS_TYPE: OTC'S
OD_SPHERE: +1.75
OD_CYLINDER: SPHERE
OS_CYLINDER: SPHERE

## 2022-05-18 ASSESSMENT — EXTERNAL EXAM - LEFT EYE: OS_EXAM: NORMAL

## 2022-05-18 ASSESSMENT — SLIT LAMP EXAM - LIDS
COMMENTS: GOOD COSMESIS
COMMENTS: GOOD COSMESIS

## 2022-05-18 ASSESSMENT — CUP TO DISC RATIO
OD_RATIO: 0.1
OS_RATIO: 0.1

## 2022-05-18 ASSESSMENT — EXTERNAL EXAM - RIGHT EYE: OD_EXAM: NORMAL

## 2022-05-18 ASSESSMENT — TONOMETRY
OS_IOP_MMHG: 19
IOP_METHOD: APPLANATION
OD_IOP_MMHG: 19

## 2022-05-18 NOTE — PROGRESS NOTES
Current Eye Medications:  none     Subjective:  Referred from Dr. Hancock for cataract eval left eye today. Just had lid surgery on upper eyelid surgery, very happy.  Was late today due to GPS taking her downtown instead of [place]. Chesaning of horse riding.  Has twin aunts and they both have AMD, one wet, one dry.      Her most noticeable visual issue is a blotchy, mobile spot in her left eye.     No history of eye injuries. S/p BULB with Dr. Ma earlier this year.      Objective:  See Ophthalmology Exam.       Assessment:  Christiana Lopes is a 66 year old female who presents with:   Encounter Diagnoses   Name Primary?     Combined forms of age-related cataract of both eyes Yes    Monitor.     Vitreous syneresis, left        Dermatochalasis of both upper eyelids s/p BULB with AM 2022        Plan:  Glasses prescription given     Call if floaters increase or worsen     Bree Swanson MD  [phone]

## 2022-05-18 NOTE — PATIENT INSTRUCTIONS
Glasses prescription given     Call if floaters increase or worsen     Lazaratia NOREEN Swanson MD  [phone]

## 2022-05-18 NOTE — LETTER
5/18/2022         RE: Christiana Lopes  [address]  [place] [address]        Dear Colleague,    Thank you for referring your patient, Christiana Lopes, to the [facility]. Please see a copy of my visit note below.     Current Eye Medications:  none     Subjective:  Referred from Dr. Hacnock for cataract eval left eye today. Just had lid surgery on upper eyelid surgery, very happy.  Was late today due to GPS taking her downtown instead of [place] horse riding.  Has twin aunts and they both have AMD, one wet, one dry.      Her most noticeable visual issue is a blotchy, mobile spot in her left eye.     No history of eye injuries. S/p BULB with Dr. Ma earlier this year.      Objective:  See Ophthalmology Exam.       Assessment:  Christiana Lopes is a 66 year old female who presents with:   Encounter Diagnoses   Name Primary?     Combined forms of age-related cataract of both eyes Yes    Monitor.     Vitreous syneresis, left        Dermatochalasis of both upper eyelids s/p BULB with AM 2022        Plan:  Glasses prescription given     Call if floaters increase or worsen     Bree Swanson MD  [phone]          Again, thank you for allowing me to participate in the care of your patient.        Sincerely,        Bree Swanson MD

## 2022-07-25 ENCOUNTER — LAB (OUTPATIENT)
Dept: LAB | Facility: OTHER | Age: 67
End: 2022-07-25
Payer: OTHER MISCELLANEOUS

## 2022-07-25 DIAGNOSIS — Z02.9 ADMINISTRATIVE ENCOUNTER: ICD-10-CM

## 2022-07-25 PROCEDURE — 87340 HEPATITIS B SURFACE AG IA: CPT

## 2022-07-25 PROCEDURE — 36415 COLL VENOUS BLD VENIPUNCTURE: CPT

## 2022-07-25 PROCEDURE — 86803 HEPATITIS C AB TEST: CPT

## 2022-07-25 PROCEDURE — 87389 HIV-1 AG W/HIV-1&-2 AB AG IA: CPT

## 2022-07-26 LAB
HBV SURFACE AG SERPL QL IA: NONREACTIVE
HCV AB SERPL QL IA: NONREACTIVE
HIV 1+2 AB+HIV1 P24 AG SERPL QL IA: NONREACTIVE

## 2023-01-08 ENCOUNTER — HEALTH MAINTENANCE LETTER (OUTPATIENT)
Age: 68
End: 2023-01-08

## 2023-04-07 ASSESSMENT — ENCOUNTER SYMPTOMS
FEVER: 0
SHORTNESS OF BREATH: 0
CONSTIPATION: 0
COUGH: 0
HEMATURIA: 0
PALPITATIONS: 0
FREQUENCY: 1
ARTHRALGIAS: 1
SORE THROAT: 0
DIZZINESS: 0
PARESTHESIAS: 0
NERVOUS/ANXIOUS: 0
EYE PAIN: 0
DYSURIA: 0
ABDOMINAL PAIN: 0
HEARTBURN: 0
MYALGIAS: 0
CHILLS: 0
NAUSEA: 0
DIARRHEA: 1
WEAKNESS: 0
JOINT SWELLING: 1
HEMATOCHEZIA: 0
BREAST MASS: 0
HEADACHES: 0

## 2023-04-07 ASSESSMENT — ACTIVITIES OF DAILY LIVING (ADL): CURRENT_FUNCTION: NO ASSISTANCE NEEDED

## 2023-04-14 ENCOUNTER — OFFICE VISIT (OUTPATIENT)
Dept: FAMILY MEDICINE | Facility: OTHER | Age: 68
End: 2023-04-14
Payer: COMMERCIAL

## 2023-04-14 VITALS
SYSTOLIC BLOOD PRESSURE: 116 MMHG | HEIGHT: 61 IN | TEMPERATURE: 99 F | HEART RATE: 61 BPM | DIASTOLIC BLOOD PRESSURE: 68 MMHG | RESPIRATION RATE: 20 BRPM | OXYGEN SATURATION: 94 % | BODY MASS INDEX: 26.24 KG/M2 | WEIGHT: 139 LBS

## 2023-04-14 DIAGNOSIS — M25.561 CHRONIC PAIN OF RIGHT KNEE: ICD-10-CM

## 2023-04-14 DIAGNOSIS — Z00.00 ENCOUNTER FOR MEDICARE ANNUAL WELLNESS EXAM: Primary | ICD-10-CM

## 2023-04-14 DIAGNOSIS — I10 HYPERTENSION GOAL BP (BLOOD PRESSURE) < 140/90: ICD-10-CM

## 2023-04-14 DIAGNOSIS — N95.1 MENOPAUSAL SYNDROME (HOT FLASHES): ICD-10-CM

## 2023-04-14 DIAGNOSIS — M19.012 PRIMARY OSTEOARTHRITIS OF LEFT SHOULDER: ICD-10-CM

## 2023-04-14 DIAGNOSIS — N39.46 MIXED STRESS AND URGE URINARY INCONTINENCE: ICD-10-CM

## 2023-04-14 DIAGNOSIS — Z12.31 VISIT FOR SCREENING MAMMOGRAM: ICD-10-CM

## 2023-04-14 DIAGNOSIS — G89.29 CHRONIC PAIN OF RIGHT KNEE: ICD-10-CM

## 2023-04-14 PROBLEM — H02.834 DERMATOCHALASIS OF BOTH UPPER EYELIDS: Status: RESOLVED | Noted: 2022-03-30 | Resolved: 2023-04-14

## 2023-04-14 PROBLEM — H02.831 DERMATOCHALASIS OF BOTH UPPER EYELIDS: Status: RESOLVED | Noted: 2022-03-30 | Resolved: 2023-04-14

## 2023-04-14 LAB
ANION GAP SERPL CALCULATED.3IONS-SCNC: 9 MMOL/L (ref 7–15)
BUN SERPL-MCNC: 25.9 MG/DL (ref 8–23)
CALCIUM SERPL-MCNC: 9.4 MG/DL (ref 8.8–10.2)
CHLORIDE SERPL-SCNC: 104 MMOL/L (ref 98–107)
CREAT SERPL-MCNC: 0.88 MG/DL (ref 0.51–0.95)
DEPRECATED HCO3 PLAS-SCNC: 27 MMOL/L (ref 22–29)
GFR SERPL CREATININE-BSD FRML MDRD: 71 ML/MIN/1.73M2
GLUCOSE SERPL-MCNC: 94 MG/DL (ref 70–99)
POTASSIUM SERPL-SCNC: 4 MMOL/L (ref 3.4–5.3)
SODIUM SERPL-SCNC: 140 MMOL/L (ref 136–145)

## 2023-04-14 PROCEDURE — 99214 OFFICE O/P EST MOD 30 MIN: CPT | Mod: 25 | Performed by: FAMILY MEDICINE

## 2023-04-14 PROCEDURE — 36415 COLL VENOUS BLD VENIPUNCTURE: CPT | Performed by: FAMILY MEDICINE

## 2023-04-14 PROCEDURE — 80048 BASIC METABOLIC PNL TOTAL CA: CPT | Performed by: FAMILY MEDICINE

## 2023-04-14 PROCEDURE — 99397 PER PM REEVAL EST PAT 65+ YR: CPT | Performed by: FAMILY MEDICINE

## 2023-04-14 RX ORDER — POTASSIUM CHLORIDE 1500 MG/1
TABLET, EXTENDED RELEASE ORAL
Qty: 270 TABLET | Refills: 3 | Status: SHIPPED | OUTPATIENT
Start: 2023-04-14 | End: 2024-01-11

## 2023-04-14 RX ORDER — MELOXICAM 15 MG/1
TABLET ORAL
Qty: 90 TABLET | Refills: 3 | Status: SHIPPED | OUTPATIENT
Start: 2023-04-14 | End: 2024-01-23

## 2023-04-14 RX ORDER — ESTRADIOL 0.5 MG/1
0.5 TABLET ORAL DAILY
Qty: 90 TABLET | Refills: 3 | Status: SHIPPED | OUTPATIENT
Start: 2023-04-14 | End: 2024-04-16

## 2023-04-14 RX ORDER — ATENOLOL AND CHLORTHALIDONE TABLET 50; 25 MG/1; MG/1
1 TABLET ORAL DAILY
Qty: 90 TABLET | Refills: 3 | Status: SHIPPED | OUTPATIENT
Start: 2023-04-14 | End: 2024-01-11

## 2023-04-14 ASSESSMENT — ENCOUNTER SYMPTOMS
WEAKNESS: 0
ABDOMINAL PAIN: 0
PALPITATIONS: 0
FREQUENCY: 1
JOINT SWELLING: 1
MYALGIAS: 0
COUGH: 0
HEMATOCHEZIA: 0
DIARRHEA: 1
SORE THROAT: 0
HEARTBURN: 0
HEADACHES: 0
FEVER: 0
DYSURIA: 0
HEMATURIA: 0
DIZZINESS: 0
ARTHRALGIAS: 1
NERVOUS/ANXIOUS: 0
PARESTHESIAS: 0
BREAST MASS: 0
CHILLS: 0
NAUSEA: 0
CONSTIPATION: 0
SHORTNESS OF BREATH: 0
EYE PAIN: 0

## 2023-04-14 ASSESSMENT — PAIN SCALES - GENERAL: PAINLEVEL: MILD PAIN (3)

## 2023-04-14 ASSESSMENT — ACTIVITIES OF DAILY LIVING (ADL): CURRENT_FUNCTION: NO ASSISTANCE NEEDED

## 2023-04-14 NOTE — PATIENT INSTRUCTIONS
Patient Education   Personalized Prevention Plan  You are due for the preventive services outlined below.  Your care team is available to assist you in scheduling these services.  If you have already completed any of these items, please share that information with your care team to update in your medical record.  Health Maintenance Due   Topic Date Due     LUNG CANCER SCREENING  Never done     ANNUAL REVIEW OF HM ORDERS  02/04/2023     Basic Metabolic Panel  04/12/2023     Mammogram  04/15/2023       Signs of Hearing Loss  Hearing loss is a problem shared by many people. In fact, it's one of the most common health problems, particularly as people age. Most people aged 65 and older have some hearing loss. By age 80, almost everyone does. Hearing loss often occurs slowly over the years. So, you may not realize your hearing has gotten worse.   When sudden hearing loss occurs, it's important to contact your healthcare provider right away. Your provider will do a medical exam and a hearing exam as soon as possible. This is to help find the cause and type of your sudden hearing loss. Based on your diagnosis, your healthcare provider will discuss possible treatments.      Hearing much better with one ear can be a sign of hearing loss.     Have your hearing checked  Call your healthcare provider if you:     Have to strain to hear normal conversation    Have to watch other people s faces very carefully to follow what they re saying    Need to ask people to repeat what they ve said    Often misunderstand what people are saying    Turn the volume of the television or radio up so high that others complain    Feel that people are mumbling when they re talking to you    Find that the effort to hear leaves you feeling tired and irritated    Notice, when using the phone, that you hear better with one ear than the other  [employer] last reviewed this educational content on 6/1/2022 2000-2022 [employer]. All rights  reserved. This information is not intended as a substitute for professional medical care. Always follow your healthcare professional's instructions.          Urinary Incontinence, Female (Adult)   Urinary incontinence means loss of bladder control. This problem affects many women, especially as they get older. If you have incontinence, you may be embarrassed to ask for help. But know that this problem can be treated.   Types of Incontinence  There are different types of incontinence. Two of the main types are described here. You can have more than one type.     Stress incontinence. With this type, urine leaks when pressure (stress) is put on the bladder. This may happen when you cough, sneeze, or laugh. Stress incontinence most often occurs because the pelvic floor muscles that support the bladder and urethra are weak. This can happen after pregnancy and vaginal childbirth or a hysterectomy. It can also be due to excess body weight or hormone changes.    Urge incontinence (also called overactive bladder). With this type, a sudden urge to urinate is felt often. This may happen even though there may not be much urine in the bladder. The need to urinate often during the night is common. Urge incontinence most often occurs because of bladder spasms. This may be due to bladder irritation or infection. Damage to bladder nerves or pelvic muscles, constipation, and certain medicines can also lead to urge incontinence.  Treatment depends on the cause. Further evaluation is needed to find the type you have. This will likely include an exam and certain tests. Based on the results, you and your healthcare provider can then plan treatment. Until a diagnosis is made, the home care tips below can help ease symptoms.   Home care    Do pelvic floor muscle exercises, if they are prescribed. The pelvic floor muscles help support the bladder and urethra. Many women find that their symptoms improve when doing special exercises that  strengthen these muscles. To do the exercises, contract the muscles you would use to stop your stream of urine. But do this when you re not urinating. Hold for 10 seconds, then relax. Repeat 10 to 20 times in a row, at least 3 times a day. Your healthcare provider may give you other instructions for how to do the exercises and how often.    Keep a bladder diary. This helps track how often and how much you urinate over a set period of time. Bring this diary with you to your next visit with the provider. The information can help your provider learn more about your bladder problem.    Lose weight, if advised to by your provider. Extra weight puts pressure on the bladder. Your provider can help you create a weight-loss plan that s right for you. This may include exercising more and making certain diet changes.    Don't have foods and drinks that may irritate the bladder. These can include alcohol and caffeinated drinks.    Quit smoking. Smoking and other tobacco use can lead to a long-term (chronic) cough that strains the pelvic floor muscles. Smoking may also damage the bladder and urethra. Talk with your provider about treatments or methods you can use to quit smoking.    If drinking large amounts of fluid makes you have symptoms, you may be advised to limit your fluid intake. You may also be advised to drink most of your fluids during the day and to limit fluids at night.    If you re worried about urine leakage or accidents, you may wear absorbent pads to catch urine. Change the pads often. This helps reduce discomfort. It may also reduce the risk of skin or bladder infections.    Follow-up care  Follow up with your healthcare provider, or as directed. It may take some to find the right treatment for your problem. But healthy lifestyle changes can be made right away. These include such things as exercising on a regular basis, eating a healthy diet, losing weight (if needed), and quitting smoking. Your treatment plan  may include special therapies or medicines. Certain procedures or surgery may also be options. Talk about any questions you have with your provider.   When to seek medical advice  Call the healthcare provider right away if any of these occur:    Fever of 100.4 F (38 C) or higher, or as directed by your provider    Bladder pain or fullness    Belly swelling    Nausea or vomiting    Back pain    Weakness, dizziness, or fainting  [employer] last reviewed this educational content on 1/1/2020 2000-2022 [employer]. All rights reserved. This information is not intended as a substitute for professional medical care. Always follow your healthcare professional's instructions.

## 2023-04-14 NOTE — PROGRESS NOTES
"SUBJECTIVE:   Christiana is a 67 year old who presents for Preventive Visit.      4/14/2023     1:25 PM   Additional Questions   Roomed by Vaishali JOINER   Accompanied by YESENIA         4/14/2023     1:25 PM   Patient Reported Additional Medications   Patient reports taking the following new medications Yes   Patient has been advised of split billing requirements and indicates understanding: Yes  Are you in the first 12 months of your Medicare coverage?  No    Healthy Habits:     In general, how would you rate your overall health?  Good    Frequency of exercise:  2-3 days/week    Duration of exercise:  Greater than 60 minutes    Do you usually eat at least 4 servings of fruit and vegetables a day, include whole grains    & fiber and avoid regularly eating high fat or \"junk\" foods?  Yes    Taking medications regularly:  Yes    Medication side effects:  None    Ability to successfully perform activities of daily living:  No assistance needed    Home Safety:  No safety concerns identified    Hearing Impairment:  Need to ask people to speak up or repeat themselves    In the past 6 months, have you been bothered by leaking of urine? Yes    In general, how would you rate your overall mental or emotional health?  Good      PHQ-2 Total Score: 0    Additional concerns today:  Yes      Have you ever done Advance Care Planning? (For example, a Health Directive, POLST, or a discussion with a medical provider or your loved ones about your wishes): No, advance care planning information given to patient to review.  Patient plans to discuss their wishes with loved ones or provider.         Fall risk  Fallen 2 or more times in the past year?: No  Any fall with injury in the past year?: No    Cognitive Screening   1) Repeat 3 items (Leader, Season, Table)    2) Clock draw: NORMAL  3) 3 item recall: Recalls 3 objects  Results: 3 items recalled: COGNITIVE IMPAIRMENT LESS LIKELY    [employer]TM Copyright S [employer]. Licensed by the author for use in " [facility]; reprinted with permission (sismauri@[facility]). All rights reserved.      Do you have sleep apnea, excessive snoring or daytime drowsiness?: no    Reviewed and updated as needed this visit by clinical staff   Tobacco  Allergies  Meds  Problems  Med Hx  Surg Hx  Fam Hx          Reviewed and updated as needed this visit by Provider   Tobacco  Allergies  Meds  Problems  Med Hx  Surg Hx  Fam Hx         Social History     Tobacco Use     Smoking status: Former     Packs/day: 1.00     Years: 2.00     Pack years: 2.00     Types: Cigarettes     Quit date: 2010     Years since quittin.7     Smokeless tobacco: Never     Tobacco comments:     no use of tobacco products   Vaping Use     Vaping status: Never Used   Substance Use Topics     Alcohol use: Yes     Comment: socially             2023    10:31 AM   Alcohol Use   Prescreen: >3 drinks/day or >7 drinks/week? No     Do you have a current opioid prescription? No  Do you use any other controlled substances or medications that are not prescribed by a provider? None      Hypertension Follow-up      Do you check your blood pressure regularly outside of the clinic? Yes     Are you following a low salt diet? Yes    Are your blood pressures ever more than 140 on the top number (systolic) OR more   than 90 on the bottom number (diastolic), for example 140/90? No      Current providers sharing in care for this patient include:   Patient Care Team:  Jigna Lees MD as PCP - General (Family Practice)  Jigna Lees MD as Assigned PCP  Lico Ma MD as Assigned Surgical Provider    The following health maintenance items are reviewed in Epic and correct as of today:  Health Maintenance   Topic Date Due     LUNG CANCER SCREENING  Never done     BMP  2023     MAMMO SCREENING  04/15/2023     MEDICARE ANNUAL WELLNESS VISIT  2024     ANNUAL REVIEW OF HM ORDERS  2024     FALL RISK ASSESSMENT  2024      ADVANCE CARE PLANNING  02/19/2026     LIPID  04/12/2027     COLORECTAL CANCER SCREENING  02/12/2028     DTAP/TDAP/TD IMMUNIZATION (3 - Td or Tdap) 08/20/2031     DEXA  03/13/2034     HEPATITIS C SCREENING  Completed     PHQ-2 (once per calendar year)  Completed     INFLUENZA VACCINE  Completed     Pneumococcal Vaccine: 65+ Years  Completed     ZOSTER IMMUNIZATION  Completed     COVID-19 Vaccine  Completed     IPV IMMUNIZATION  Aged Out     MENINGITIS IMMUNIZATION  Aged Out           2/19/2021     2:56 PM   Breast CA Risk Assessment (FHS-7)   Do you have a family history of breast, colon, or ovarian cancer? No / Unknown       Mammogram Screening: Recommended mammography every 1-2 years with patient discussion and risk factor consideration  Pertinent mammograms are reviewed under the imaging tab.    Review of Systems   Constitutional: Negative for chills and fever.   HENT: Positive for hearing loss. Negative for congestion, ear pain and sore throat.    Eyes: Positive for visual disturbance (follows with eye specialist). Negative for pain.   Respiratory: Negative for cough and shortness of breath.    Cardiovascular: Negative for chest pain, palpitations and peripheral edema.   Gastrointestinal: Positive for diarrhea. Negative for abdominal pain, constipation, heartburn, hematochezia and nausea.   Breasts:  Negative for tenderness, breast mass and discharge.   Genitourinary: Positive for frequency and urgency. Negative for dysuria, genital sores, hematuria, pelvic pain, vaginal bleeding and vaginal discharge.   Musculoskeletal: Positive for arthralgias (multiple joint pains, better with activity) and joint swelling. Negative for myalgias.   Skin: Negative for rash.   Neurological: Negative for dizziness, weakness, headaches and paresthesias.   Psychiatric/Behavioral: Negative for mood changes. The patient is not nervous/anxious.          OBJECTIVE:   /68   Pulse 61   Temp 99  F (37.2  C) (Temporal)   Resp  "20   Ht 1.55 m (5' 1.02\")   Wt 63 kg (139 lb)   SpO2 94%   BMI 26.24 kg/m   Estimated body mass index is 26.24 kg/m  as calculated from the following:    Height as of this encounter: 1.55 m (5' 1.02\").    Weight as of this encounter: 63 kg (139 lb).  Physical Exam  Constitutional:       General: She is not in acute distress.     Appearance: She is well-developed.   HENT:      Right Ear: Tympanic membrane and external ear normal.      Left Ear: Tympanic membrane and external ear normal.      Nose: Nose normal.   Eyes:      General:         Right eye: No discharge.         Left eye: No discharge.      Conjunctiva/sclera: Conjunctivae normal.      Pupils: Pupils are equal, round, and reactive to light.   Neck:      Thyroid: No thyroid mass.   Cardiovascular:      Rate and Rhythm: Normal rate and regular rhythm.      Heart sounds: Normal heart sounds, S1 normal and S2 normal. No murmur heard.  Pulmonary:      Effort: Pulmonary effort is normal. No respiratory distress.      Breath sounds: Normal breath sounds. No wheezing or rales.   Abdominal:      General: Bowel sounds are normal.      Palpations: Abdomen is soft. There is no mass.      Tenderness: There is no abdominal tenderness.   Musculoskeletal:         General: Normal range of motion.      Cervical back: Neck supple.   Lymphadenopathy:      Cervical: No cervical adenopathy.   Skin:     General: Skin is warm and dry.      Findings: No rash.   Neurological:      Mental Status: She is alert and oriented to person, place, and time.   Psychiatric:         Mood and Affect: Mood normal.         Behavior: Behavior normal.         Thought Content: Thought content normal.         Judgment: Judgment normal.           ASSESSMENT / PLAN:   (Z00.00) Encounter for Medicare annual wellness exam  (primary encounter diagnosis)  Plan: PRIMARY CARE FOLLOW-UP SCHEDULING          (I10) Hypertension goal BP (blood pressure) < 140/90  Comment: Well-controlled.  Labs drawn.  Refills " "given.  Plan: BASIC METABOLIC PANEL, atenolol-chlorthalidone         (TENORETIC) 50-25 MG tablet, potassium chloride        ER (K-TAB) 20 MEQ CR tablet          (Z12.31) Visit for screening mammogram  Plan: MA SCREENING DIGITAL BILAT - Future  (s+30)          (M25.561,  G89.29) Chronic pain of right knee/(M19.012) Primary osteoarthritis of left shoulder  Comment: She complains of pain in her right knee.  She had a steroid injection about 8 years ago which worked well until recently.  She states she gets pain in her anterior medial joint line.  She would like to avoid surgery if possible.  She is very active.  Will refer to sports medicine.  She does use topical diclofenac.  She also is on daily meloxicam.  Plan: Orthopedic  Referral          (N39.46) Mixed stress and urge urinary incontinence  Comment: Last year she had inquired about referral to urology to discuss possible surgical procedures for incontinence.  She moved and was not able to find the time to go.  She would like to have that referral now.  Plan: Adult Urology  Referral          (N95.1) Menopausal syndrome (hot flashes)  Comment: Patient has been on estrogen for 6 years.  We discussed increase cardiovascular and cancer risks with long-term use of estrogen.  She states she still gets some hot flashes and is concerned about quality of life if she goes off the medication.  She will continue on her estradiol but she will try to see if she can decrease to every other day.  Plan: estradiol (ESTRACE) 0.5 MG tablet          COUNSELING:  Reviewed preventive health counseling, as reflected in patient instructions      BMI:   Estimated body mass index is 26.24 kg/m  as calculated from the following:    Height as of this encounter: 1.55 m (5' 1.02\").    Weight as of this encounter: 63 kg (139 lb).         She reports that she quit smoking about 12 years ago. Her smoking use included cigarettes. She has a 2.00 pack-year smoking history. She has " never used smokeless tobacco.      Appropriate preventive services were discussed with this patient, including applicable screening as appropriate for cardiovascular disease, diabetes, osteopenia/osteoporosis, and glaucoma.  As appropriate for age/gender, discussed screening for colorectal cancer, prostate cancer, breast cancer, and cervical cancer. Checklist reviewing preventive services available has been given to the patient.    Reviewed patients plan of care and provided an AVS. The Basic Care Plan (routine screening as documented in Health Maintenance) for Christiana meets the Care Plan requirement. This Care Plan has been established and reviewed with the Patient.      Jigna Lees MD  [facility]    Identified Health Risks:      The patient was provided with written information regarding signs of hearing loss.  Information on urinary incontinence and treatment options given to patient.

## 2023-04-19 ENCOUNTER — MYC MEDICAL ADVICE (OUTPATIENT)
Dept: FAMILY MEDICINE | Facility: OTHER | Age: 68
End: 2023-04-19
Payer: COMMERCIAL

## 2023-04-25 ENCOUNTER — OFFICE VISIT (OUTPATIENT)
Dept: UROLOGY | Facility: CLINIC | Age: 68
End: 2023-04-25
Attending: UROLOGY
Payer: COMMERCIAL

## 2023-04-25 VITALS — WEIGHT: 140.3 LBS | BODY MASS INDEX: 26.49 KG/M2 | HEIGHT: 61 IN

## 2023-04-25 DIAGNOSIS — N39.46 MIXED STRESS AND URGE URINARY INCONTINENCE: ICD-10-CM

## 2023-04-25 DIAGNOSIS — N39.3 SUI (STRESS URINARY INCONTINENCE, FEMALE): Primary | ICD-10-CM

## 2023-04-25 LAB
ALBUMIN UR-MCNC: NEGATIVE MG/DL
APPEARANCE UR: CLEAR
BILIRUB UR QL STRIP: NEGATIVE
COLOR UR AUTO: YELLOW
GLUCOSE UR STRIP-MCNC: NEGATIVE MG/DL
HGB UR QL STRIP: NEGATIVE
KETONES UR STRIP-MCNC: NEGATIVE MG/DL
LEUKOCYTE ESTERASE UR QL STRIP: NEGATIVE
NITRATE UR QL: NEGATIVE
PH UR STRIP: 5 [PH] (ref 5–7)
SP GR UR STRIP: 1.02 (ref 1–1.03)
UROBILINOGEN UR STRIP-MCNC: NORMAL MG/DL

## 2023-04-25 PROCEDURE — 99205 OFFICE O/P NEW HI 60 MIN: CPT | Mod: 25 | Performed by: UROLOGY

## 2023-04-25 PROCEDURE — 81003 URINALYSIS AUTO W/O SCOPE: CPT | Performed by: UROLOGY

## 2023-04-25 PROCEDURE — 52000 CYSTOURETHROSCOPY: CPT | Performed by: UROLOGY

## 2023-04-25 ASSESSMENT — PAIN SCALES - GENERAL: PAINLEVEL: NO PAIN (0)

## 2023-04-25 NOTE — PROGRESS NOTES
"Christiana Lopes is a 67 year old female seen in consultation for incontinence. Consult from Jigna Lees.      Pt reports 10 yr hx progressive YENNI, bothersome with exercise, riding horse, lifting, etc. Pt wears 1-2 med pads per day, sometimes at nite.    Denies dysuria, gross hematuria, frequency; voids about q 3 hrs during the day. No nocturia.    Seen by  many yrs ago for post-coital UTI, started on post-coital abx, issue resolved. No subsequent UTI issues.     Denies hx bladder surgery, use of bladder meds, success with Kegel's.    Hx 2 c/s, productive of 3 children (set of twins). GI somewhat unstable depending on diet; denies active constipation or fecal incontinence.    Drinks 2-3 caf coffee per day, 6 bottles of water \"to try to save my kidneys.\"      Reviewed PMH in detail including cataract, cervical cancer, depression, HTN      Pt is extremely active; rides her horse for  miles      Past Medical History:   Diagnosis Date     Arthritis     ostio     Cervix cancer (H) 01/01/1990     Depression     after 's death     History of left breast biopsy 01/01/2011    negative     HTN      Nonsenile cataract      UTI     history of        Past Surgical History:   Procedure Laterality Date     ARTHROPLASTY SHOULDER Left 02/12/2020    Procedure: Left shoulder hemiarthroplasy with bicep tenodesis;  Surgeon: Nura Cole MD;  Location:  OR     ARTHROSCOPY SHOULDER Left 02/12/2020    Procedure: left shoulder arthroscopy;  Surgeon: Nura Cole MD;  Location:  OR     BACK SURGERY  01/01/2009     BIOPSY BREAST  01/01/2011    benign     BLEPHAROPLASTY Bilateral 04/18/2022    Procedure: BLEPHAROPLASTY;  Surgeon: Lico Ma MD;  Location:  OR     C/SECTION, CLASSICAL      x2     COLONOSCOPY  01/01/2005     COLONOSCOPY N/A 02/12/2018    Procedure: COLONOSCOPY;  COLONOSCOPY;  Surgeon: Ken Fair MD;  Location:  GI     HEAD & NECK SURGERY  01/01/2011     HERNIA " REPAIR  2003     HYSTERECTOMY  1990    one ovary removed, h/o cervical cancer     REPAIR PTOSIS       ZZC APPENDECTOMY       ZZC ENLARGE BREAST  2006     ZZC NONSPECIFIC PROCEDURE  2004    ventral hernia repair     ZZC NONSPECIFIC PROCEDURE  2011    Lumbar fusion L5-S1     ZZC NONSPECIFIC PROCEDURE      cervical fusion C3       Social History     Socioeconomic History     Marital status:      Spouse name: Not on file     Number of children: Not on file     Years of education: Not on file     Highest education level: Not on file   Occupational History     Not on file   Tobacco Use     Smoking status: Former     Packs/day: 1.00     Years: 2.00     Pack years: 2.00     Types: Cigarettes     Quit date: 2010     Years since quittin.7     Smokeless tobacco: Never     Tobacco comments:     no use of tobacco products   Vaping Use     Vaping status: Never Used   Substance and Sexual Activity     Alcohol use: Yes     Comment: socially     Drug use: No     Sexual activity: Yes     Partners: Male     Birth control/protection: Surgical   Other Topics Concern     Parent/sibling w/ CABG, MI or angioplasty before 65F 55M? 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)     Bike Helmet Not Asked     Comment: n/a     Seat Belt Yes     Self-Exams Yes   Social History Narrative     Not on file     Social Determinants of Health     Financial Resource Strain: Not on file   Food Insecurity: Not on file   Transportation Needs: Not on file   Physical Activity: Not on file   Stress: Not on file   Social Connections: Not on file   Intimate Partner Violence: Not on file  "  Housing Stability: Not on file       Current Outpatient Medications   Medication Sig Dispense Refill     atenolol-chlorthalidone (TENORETIC) 50-25 MG tablet Take 1 tablet by mouth daily 90 tablet 3     estradiol (ESTRACE) 0.5 MG tablet Take 1 tablet (0.5 mg) by mouth daily 90 tablet 3     fish oil-omega-3 fatty acids 1000 MG capsule Take 2 g by mouth daily.       MAGNESIUM PO        meloxicam (MOBIC) 15 MG tablet TAKE 1 TABLET DAILY AS NEEDED 90 tablet 3     Methylsulfonylmethane (MSM PO)        Multiple Vitamin (MULTIVITAMIN OR) Take 1 tablet by mouth daily.       potassium chloride ER (K-TAB) 20 MEQ CR tablet TAKE 1 TABLET THREE TIMES A  tablet 3     ST JOHNS WORT PO Take by mouth 2 times daily          Physical Exam:    GENL: NAD.  5'1\"  140#   ABD: Soft, non-tender, no masses.    EG: Moderately-estrogenized, no masses.    VAGINA: Moderately-estrogenized, no masses.    BN HYPERMOBILITY: Mild.    CYSTOCELE: Minimal.    APICAL PROLAPSE: None.    RECTOCELE: None.    BIMANUAL: No mass or tenderness.    Cysto:    (Informed consent obtained. Pause for cause performed)   Sterile prep.    17 Fr scope inserted through urethra. Systematic examination w 70 degree lens.   PVR: 5 cc   MUCOSA: Normal without lesion   ORIFICES: Normal location and morphology   CAPACITY: 500 cc; no pain with filling   Scope withdrawn without untoward effect.    Valsalva:   Mild hypermobility, marked leakage noted.    (Pt tolerated procedure without difficulty).        Results for orders placed or performed in visit on 04/25/23   UA reflex to Microscopic     Status: Normal   Result Value Ref Range    Color Urine Yellow Colorless, Straw, Light Yellow, Yellow    Appearance Urine Clear Clear    Glucose Urine Negative Negative mg/dL    Bilirubin Urine Negative Negative    Ketones Urine Negative Negative mg/dL    Specific Gravity Urine 1.019 1.003 - 1.035    Blood Urine Negative Negative    pH Urine 5.0 5.0 - 7.0    Protein Albumin Urine " Negative Negative mg/dL    Urobilinogen Urine Normal Normal, 2.0 mg/dL    Nitrite Urine Negative Negative    Leukocyte Esterase Urine Negative Negative    Narrative    Microscopic not indicated                   IMP:  1. YENNI with hypermobility, bothersome; very active lifestyle      PLAN:  1. Discussed situation with patient in detail.    2. ALTIS SLING DISCUSSION: We discussed the patients situation in detail including her specific anatomy and conditions. Diagrams are drawn.    We discussed the surgical treatment including Altis pubovaginal sling utilizing mesh material. We addressed the technical aspects of the procedure as well as the potential risks and complications incuding, but not limited to bleeding, infection, damage to organs or other injury. We discussed the recovery process and the potential effect on sexual function in detail. She also understands the alternative forms of therapy (including no therapy and treatment without mesh), and the potential need for additional therapy. We discussed the FDA report on the use of surgical mesh. All questions are answered in detail. Informed consent is obtained. Consent form signed. Handout given.    Pt is eager to proceed in the near future. Orders entered.    3. Total time spent in reviewing patient records, discussing history, performing exam, discussing diagnosis, outlining treatment plan and documentation: 60 minutes

## 2023-04-26 ENCOUNTER — TELEPHONE (OUTPATIENT)
Dept: UROLOGY | Facility: CLINIC | Age: 68
End: 2023-04-26
Payer: COMMERCIAL

## 2023-04-26 PROBLEM — N39.3 SUI (STRESS URINARY INCONTINENCE, FEMALE): Status: ACTIVE | Noted: 2023-04-26

## 2023-04-26 NOTE — TELEPHONE ENCOUNTER
Type of surgery: Pubovaginal sling with Altis (N/A)   CPT 67342   YENNI (stress urinary incontinence, female) N39.3     Mixed stress and urge urinary incontinence N39.46    Location of surgery: [facility]  Date and time of surgery: 05/15/2023  Surgeon: AMADA  Pre-Op Appt Date: 05/12/2023  Post-Op Appt Date: 06/13/2023   Packet sent out: Yes  Pre-cert/Authorization completed:  No prior auth required for [employer] [place] per [employer] online portal. Transaction ID: [id number]    Date: 4-26-23    Aletha Rodriguez  Financial Securing/Prior Auth Dept  [phone]

## 2023-05-09 ENCOUNTER — HOSPITAL ENCOUNTER (OUTPATIENT)
Dept: MAMMOGRAPHY | Facility: CLINIC | Age: 68
Discharge: HOME | End: 2023-05-09
Attending: FAMILY MEDICINE | Admitting: FAMILY MEDICINE
Payer: COMMERCIAL

## 2023-05-09 DIAGNOSIS — Z12.31 VISIT FOR SCREENING MAMMOGRAM: ICD-10-CM

## 2023-05-09 PROCEDURE — 77067 SCR MAMMO BI INCL CAD: CPT

## 2023-05-10 ENCOUNTER — ANESTHESIA EVENT (OUTPATIENT)
Dept: SURGERY | Facility: AMBULATORY SURGERY CENTER | Age: 68
End: 2023-05-10
Payer: COMMERCIAL

## 2023-05-12 ENCOUNTER — OFFICE VISIT (OUTPATIENT)
Dept: FAMILY MEDICINE | Facility: OTHER | Age: 68
End: 2023-05-12
Payer: COMMERCIAL

## 2023-05-12 VITALS
DIASTOLIC BLOOD PRESSURE: 74 MMHG | RESPIRATION RATE: 16 BRPM | WEIGHT: 139 LBS | HEART RATE: 61 BPM | TEMPERATURE: 97.4 F | SYSTOLIC BLOOD PRESSURE: 128 MMHG | HEIGHT: 61 IN | BODY MASS INDEX: 26.24 KG/M2 | OXYGEN SATURATION: 95 %

## 2023-05-12 DIAGNOSIS — Z01.818 PREOP GENERAL PHYSICAL EXAM: Primary | ICD-10-CM

## 2023-05-12 DIAGNOSIS — N39.3 SUI (STRESS URINARY INCONTINENCE, FEMALE): ICD-10-CM

## 2023-05-12 DIAGNOSIS — I10 HYPERTENSION GOAL BP (BLOOD PRESSURE) < 140/90: ICD-10-CM

## 2023-05-12 PROCEDURE — 99214 OFFICE O/P EST MOD 30 MIN: CPT | Performed by: FAMILY MEDICINE

## 2023-05-12 ASSESSMENT — PAIN SCALES - GENERAL: PAINLEVEL: NO PAIN (0)

## 2023-05-12 NOTE — PROGRESS NOTES
[address]  [facility] [address]  Phone: [phone]  Primary Provider: Jigna Gil  Pre-op Performing Provider: JIGNA GIL      PREOPERATIVE EVALUATION:  Today's date: 5/12/2023    Christiana Lopes is a 67 year old female who presents for a preoperative evaluation.      5/12/2023    10:32 AM   Additional Questions   Roomed by Neela MORENO   Accompanied by Self     Surgical Information:  Surgery/Procedure: Bladder sling   Surgery Location: [facility]    Surgeon: Trevor   Surgery Date: 06:30am  Time of Surgery: 05/15/2023  Where patient plans to recover: At home with family  Fax number for surgical facility: Note does not need to be faxed, will be available electronically in Epic.    Assessment & Plan     The proposed surgical procedure is considered INTERMEDIATE risk.    Preop general physical exam    YENNI (stress urinary incontinence, female)    Hypertension goal BP (blood pressure) < 140/90  Well controlled      - No identified additional risk factors other than previously addressed    Additional Medication Instructions:   - meloxicam (Mobic): HOLD 10 days before surgery.     RECOMMENDATION:  APPROVAL GIVEN to proceed with proposed procedure, without further diagnostic evaluation.    Subjective     HPI related to upcoming procedure: stress incontinence        5/10/2023     8:05 AM   Preop Questions   1. Have you ever had a heart attack or stroke? No   2. Have you ever had surgery on your heart or blood vessels, such as a stent placement, a coronary artery bypass, or surgery on an artery in your head, neck, heart, or legs? No   3. Do you have chest pain with activity? No   4. Do you have a history of  heart failure? No   5. Do you currently have a cold, bronchitis or symptoms of other infection? No   6. Do you have a cough, shortness of breath, or wheezing? No   7. Do you or anyone in your family have previous history of blood  clots? No   8. Do you or does anyone in your family have a serious bleeding problem such as prolonged bleeding following surgeries or cuts? No   9. Have you ever had problems with anemia or been told to take iron pills? No   10. Have you had any abnormal blood loss such as black, tarry or bloody stools, or abnormal vaginal bleeding? No   11. Have you ever had a blood transfusion? No   12. Are you willing to have a blood transfusion if it is medically needed before, during, or after your surgery? Yes   13. Have you or any of your relatives ever had problems with anesthesia? No   14. Do you have sleep apnea, excessive snoring or daytime drowsiness? No   15. Do you have any artifical heart valves or other implanted medical devices like a pacemaker, defibrillator, or continuous glucose monitor? No   16. Do you have artificial joints? YES - left shoulder   17. Are you allergic to latex? No       Health Care Directive:  Patient does not have a Health Care Directive or Living Will: Discussed advance care planning with patient; however, patient declined at this time.    Preoperative Review of :   reviewed - no record of controlled substances prescribed.    Status of Chronic Conditions:  HYPERTENSION - Patient has longstanding history of HTN , currently denies any symptoms referable to elevated blood pressure. Specifically denies chest pain, palpitations, dyspnea, orthopnea, PND or peripheral edema. Blood pressure readings have been in normal range. Current medication regimen is as listed below. Patient denies any side effects of medication.       Review of Systems  CONSTITUTIONAL: NEGATIVE for fever, chills, change in weight  INTEGUMENTARY/SKIN: NEGATIVE for worrisome rashes, moles or lesions  EYES: NEGATIVE for vision changes or irritation  ENT/MOUTH: NEGATIVE for ear, mouth and throat problems  RESP: NEGATIVE for significant cough or SOB  CV: NEGATIVE for chest pain, palpitations or peripheral edema  GI: NEGATIVE  "for nausea, abdominal pain, heartburn, or change in bowel habits   female: incontinence-stress  MUSCULOSKELETAL: NEGATIVE for significant arthralgias or myalgia  NEURO: NEGATIVE for weakness, dizziness or paresthesias  ENDOCRINE: NEGATIVE for temperature intolerance, skin/hair changes  HEME: NEGATIVE for bleeding problems  PSYCHIATRIC: NEGATIVE for changes in mood or affect    Patient Active Problem List    Diagnosis Date Noted     YENNI (stress urinary incontinence, female) 04/26/2023     Priority: Medium     Added automatically from request for surgery [id number]       History of cervical cancer 06/26/2013     Priority: Medium     1990 Hysterectomy due to cervical cancer. She was told it went deeper than they expected but they got it all.  10/27/09 NIL pap. Exam notes report, \"Cervix is without inflammation or abnormal discharge\". Continue cervical cancer screening.   8/25/11 NIL pap/neg HR HPV  6/26/13 NIL pap  11/8/16 NIL pap/neg HR HPV.          Hypertension goal BP (blood pressure) < 140/90 12/12/2010     Priority: Medium      Past Medical History:   Diagnosis Date     Arthritis     ostio     Cervix cancer (H) 01/01/1990     Depression     after 's death     History of left breast biopsy 01/01/2011    negative     HTN      Nonsenile cataract      UTI     history of      Past Surgical History:   Procedure Laterality Date     ARTHROPLASTY SHOULDER Left 02/12/2020    Procedure: Left shoulder hemiarthroplasy with bicep tenodesis;  Surgeon: Nura Cole MD;  Location: [facility]     ARTHROSCOPY SHOULDER Left 02/12/2020    Procedure: left shoulder arthroscopy;  Surgeon: Nura Cole MD;  Location: [facility]     BACK SURGERY  01/01/2009     BIOPSY BREAST  01/01/2011    benign     BLEPHAROPLASTY Bilateral 04/18/2022    Procedure: BLEPHAROPLASTY;  Surgeon: Lico Ma MD;  Location: [facility]     C/SECTION, CLASSICAL      x2     COLONOSCOPY  01/01/2005     COLONOSCOPY N/A 02/12/2018    Procedure: " "COLONOSCOPY;  COLONOSCOPY;  Surgeon: Ken Fair MD;  Location:  GI     HEAD & NECK SURGERY  2011     HERNIA REPAIR  2003     HYSTERECTOMY  1990    one ovary removed, h/o cervical cancer     REPAIR PTOSIS       ZZC APPENDECTOMY       ZZC ENLARGE BREAST  2006     ZZC NONSPECIFIC PROCEDURE  2004    ventral hernia repair     ZZC NONSPECIFIC PROCEDURE  2011    Lumbar fusion L5-S1     ZZC NONSPECIFIC PROCEDURE      cervical fusion C3     Current Outpatient Medications   Medication Sig Dispense Refill     atenolol-chlorthalidone (TENORETIC) 50-25 MG tablet Take 1 tablet by mouth daily 90 tablet 3     estradiol (ESTRACE) 0.5 MG tablet Take 1 tablet (0.5 mg) by mouth daily 90 tablet 3     fish oil-omega-3 fatty acids 1000 MG capsule Take 2 g by mouth daily.       MAGNESIUM PO        meloxicam (MOBIC) 15 MG tablet TAKE 1 TABLET DAILY AS NEEDED 90 tablet 3     Methylsulfonylmethane (MSM PO)        Multiple Vitamin (MULTIVITAMIN OR) Take 1 tablet by mouth daily.       potassium chloride ER (K-TAB) 20 MEQ CR tablet TAKE 1 TABLET THREE TIMES A  tablet 3     ST JOHNS WORT PO Take by mouth 2 times daily          No Known Allergies     Social History     Tobacco Use     Smoking status: Former     Packs/day: 1.00     Years: 2.00     Pack years: 2.00     Types: Cigarettes     Quit date: 2010     Years since quittin.7     Smokeless tobacco: Never     Tobacco comments:     no use of tobacco products   Vaping Use     Vaping status: Never Used   Substance Use Topics     Alcohol use: Yes     Comment: socially       History   Drug Use No         Objective     /74   Pulse 61   Temp 97.4  F (36.3  C) (Temporal)   Resp 16   Ht 1.553 m (5' 1.14\")   Wt 63 kg (139 lb)   SpO2 95%   BMI 26.14 kg/m      Physical Exam    GENERAL APPEARANCE: healthy, alert and no distress     EYES: EOMI, PERRL     HENT: ear canals and TM's normal and nose and mouth without ulcers or lesions     " NECK: no adenopathy, no asymmetry, masses, or scars and thyroid normal to palpation     RESP: lungs clear to auscultation - no rales, rhonchi or wheezes     CV: regular rates and rhythm, normal S1 S2, no S3 or S4 and no murmur, click or rub     ABDOMEN:  soft, nontender, no HSM or masses and bowel sounds normal     MS: extremities normal- no gross deformities noted, no evidence of inflammation in joints, FROM in all extremities.     SKIN: no suspicious lesions or rashes     NEURO: Normal strength and tone, sensory exam grossly normal, mentation intact and speech normal     PSYCH: mentation appears normal. and affect normal/bright     LYMPHATICS: No cervical adenopathy    Recent Labs   Lab Test 04/14/23  1423 04/12/22  1100   HGB  --  11.6*   PLT  --  268    141   POTASSIUM 4.0 4.2   CR 0.88 0.92        Diagnostics:    No EKG required, no history of coronary heart disease, significant arrhythmia, peripheral arterial disease or other structural heart disease.    Revised Cardiac Risk Index (RCRI):  The patient has the following serious cardiovascular risks for perioperative complications:   - No serious cardiac risks = 0 points     RCRI Interpretation: 0 points: Class I (very low risk - 0.4% complication rate)           Signed Electronically by: Jigna Lees MD  Copy of this evaluation report is provided to requesting physician.

## 2023-05-12 NOTE — ANESTHESIA PREPROCEDURE EVALUATION
Anesthesia Pre-Procedure Evaluation    Patient: Christiana Lopes   MRN: [id number] : 1955        Procedure : Procedure(s):  Pubovaginal sling with Altis          Past Medical History:   Diagnosis Date     Arthritis     ostio     Cervix cancer (H) 1990     Depression     after 's death     History of left breast biopsy 2011    negative     HTN      Nonsenile cataract      UTI     history of       Past Surgical History:   Procedure Laterality Date     ARTHROPLASTY SHOULDER Left 2020    Procedure: Left shoulder hemiarthroplasy with bicep tenodesis;  Surgeon: Nura Cole MD;  Location:  OR     ARTHROSCOPY SHOULDER Left 2020    Procedure: left shoulder arthroscopy;  Surgeon: Nura Cole MD;  Location:  OR     BACK SURGERY  2009     BIOPSY BREAST  2011    benign     BLEPHAROPLASTY Bilateral 2022    Procedure: BLEPHAROPLASTY;  Surgeon: Lico Ma MD;  Location:  OR     C/SECTION, CLASSICAL      x2     COLONOSCOPY  2005     COLONOSCOPY N/A 2018    Procedure: COLONOSCOPY;  COLONOSCOPY;  Surgeon: Ken Fair MD;  Location:  GI     HEAD & NECK SURGERY  2011     HERNIA REPAIR  2003     HYSTERECTOMY  1990    one ovary removed, h/o cervical cancer     REPAIR PTOSIS       ZZC APPENDECTOMY       ZZC ENLARGE BREAST  2006     ZZC NONSPECIFIC PROCEDURE  2004    ventral hernia repair     ZZC NONSPECIFIC PROCEDURE  2011    Lumbar fusion L5-S1     ZZC NONSPECIFIC PROCEDURE      cervical fusion C3      No Known Allergies   Social History     Tobacco Use     Smoking status: Former     Packs/day: 1.00     Years: 2.00     Pack years: 2.00     Types: Cigarettes     Quit date: 2010     Years since quittin.7     Smokeless tobacco: Never     Tobacco comments:     no use of tobacco products   Vaping Use     Vaping status: Never Used   Substance Use Topics     Alcohol use: Yes     Comment:  socially      Wt Readings from Last 1 Encounters:   04/25/23 63.6 kg (140 lb 4.8 oz)        Anesthesia Evaluation   Pt has had prior anesthetic.         ROS/MED HX  ENT/Pulmonary:       Neurologic:       Cardiovascular:     (+) hypertension----- (-) murmur   METS/Exercise Tolerance:     Hematologic:       Musculoskeletal:       GI/Hepatic:       Renal/Genitourinary:       Endo:       Psychiatric/Substance Use:       Infectious Disease:       Malignancy:       Other:            Physical Exam    Airway        Mallampati: II   TM distance: > 3 FB   Neck ROM: full   Mouth opening: > 3 cm    Respiratory Devices and Support         Dental       (+) Modest Abnormalities - crowns, retainers, 1 or 2 missing teeth      Cardiovascular          Rhythm and rate: regular and normal (-) no murmur    Pulmonary   pulmonary exam normal        breath sounds clear to auscultation           OUTSIDE LABS:  CBC:   Lab Results   Component Value Date    WBC 5.2 04/12/2022    WBC 6.4 02/04/2020    HGB 11.6 (L) 04/12/2022    HGB 12.0 02/04/2020    HCT 35.5 04/12/2022    HCT 35.8 02/04/2020     04/12/2022     02/04/2020     BMP:   Lab Results   Component Value Date     04/14/2023     04/12/2022    POTASSIUM 4.0 04/14/2023    POTASSIUM 4.2 04/12/2022    CHLORIDE 104 04/14/2023    CHLORIDE 108 04/12/2022    CO2 27 04/14/2023    CO2 26 04/12/2022    BUN 25.9 (H) 04/14/2023    BUN 22 04/12/2022    CR 0.88 04/14/2023    CR 0.92 04/12/2022    GLC 94 04/14/2023    GLC 98 04/12/2022     COAGS: No results found for: PTT, INR, FIBR  POC: No results found for: BGM, HCG, HCGS  HEPATIC:   Lab Results   Component Value Date    ALBUMIN 4.8 10/27/2009     OTHER:   Lab Results   Component Value Date    JERRI 9.4 04/14/2023    PHOS 3.4 10/27/2009    CRP <5.0 07/08/2014    SED 9 07/08/2014       Anesthesia Plan    ASA Status:  2   NPO Status:  NPO Appropriate    Anesthesia Type: MAC.     - Reason for MAC: immobility needed   Induction:  Intravenous, Propofol.   Maintenance: TIVA.        Consents    Anesthesia Plan(s) and associated risks, benefits, and realistic alternatives discussed. Questions answered and patient/representative(s) expressed understanding.    - Discussed:     - Discussed with:  Patient      - Extended Intubation/Ventilatory Support Discussed: No.      - Patient is DNR/DNI Status: No    Use of blood products discussed: No .     Postoperative Care    Pain management: IV analgesics.   PONV prophylaxis: Ondansetron (or other 5HT-3), Background Propofol Infusion     Comments:    Other Comments: Discussed plan for IV anesthetic with native airway. Discussed potential need for conversion to general anesthetic with airway management and potential for recall.              Omar Streeter MD

## 2023-05-15 ENCOUNTER — SURGERY (OUTPATIENT)
Age: 68
End: 2023-05-15
Payer: COMMERCIAL

## 2023-05-15 ENCOUNTER — ANESTHESIA (OUTPATIENT)
Dept: SURGERY | Facility: AMBULATORY SURGERY CENTER | Age: 68
End: 2023-05-15
Payer: COMMERCIAL

## 2023-05-15 ENCOUNTER — HOSPITAL ENCOUNTER (OUTPATIENT)
Facility: AMBULATORY SURGERY CENTER | Age: 68
Discharge: HOME | End: 2023-05-15
Attending: UROLOGY | Admitting: UROLOGY
Payer: COMMERCIAL

## 2023-05-15 VITALS
BODY MASS INDEX: 26.14 KG/M2 | RESPIRATION RATE: 16 BRPM | OXYGEN SATURATION: 100 % | HEART RATE: 63 BPM | TEMPERATURE: 97.1 F | DIASTOLIC BLOOD PRESSURE: 94 MMHG | SYSTOLIC BLOOD PRESSURE: 175 MMHG | WEIGHT: 139 LBS

## 2023-05-15 DIAGNOSIS — N39.3 SUI (STRESS URINARY INCONTINENCE, FEMALE): ICD-10-CM

## 2023-05-15 PROCEDURE — G8916 PT W IV AB GIVEN ON TIME: HCPCS

## 2023-05-15 PROCEDURE — G8907 PT DOC NO EVENTS ON DISCHARG: HCPCS

## 2023-05-15 PROCEDURE — C1771 REP DEV, URINARY, W/SLING: HCPCS

## 2023-05-15 PROCEDURE — 57288 REPAIR BLADDER DEFECT: CPT | Performed by: UROLOGY

## 2023-05-15 PROCEDURE — 57288 REPAIR BLADDER DEFECT: CPT

## 2023-05-15 DEVICE — MESH SLING SINGLE INCISION ALTIS 519650: Type: IMPLANTABLE DEVICE | Site: VAGINA | Status: FUNCTIONAL

## 2023-05-15 RX ORDER — OXYCODONE HYDROCHLORIDE 5 MG/1
5 TABLET ORAL
Status: DISCONTINUED | OUTPATIENT
Start: 2023-05-15 | End: 2023-05-16 | Stop reason: HOSPADM

## 2023-05-15 RX ORDER — PROPOFOL 10 MG/ML
INJECTION, EMULSION INTRAVENOUS PRN
Status: DISCONTINUED | OUTPATIENT
Start: 2023-05-15 | End: 2023-05-15

## 2023-05-15 RX ORDER — OXYCODONE HYDROCHLORIDE 5 MG/1
5-10 TABLET ORAL EVERY 4 HOURS PRN
Qty: 6 TABLET | Refills: 0 | Status: SHIPPED | OUTPATIENT
Start: 2023-05-15 | End: 2023-12-29

## 2023-05-15 RX ORDER — MEPERIDINE HYDROCHLORIDE 25 MG/ML
12.5 INJECTION INTRAMUSCULAR; INTRAVENOUS; SUBCUTANEOUS EVERY 5 MIN PRN
Status: DISCONTINUED | OUTPATIENT
Start: 2023-05-15 | End: 2023-05-16 | Stop reason: HOSPADM

## 2023-05-15 RX ORDER — ACETAMINOPHEN 325 MG/1
975 TABLET ORAL ONCE
Status: DISCONTINUED | OUTPATIENT
Start: 2023-05-15 | End: 2023-05-16 | Stop reason: HOSPADM

## 2023-05-15 RX ORDER — FENTANYL CITRATE 50 UG/ML
INJECTION, SOLUTION INTRAMUSCULAR; INTRAVENOUS PRN
Status: DISCONTINUED | OUTPATIENT
Start: 2023-05-15 | End: 2023-05-15

## 2023-05-15 RX ORDER — LABETALOL HYDROCHLORIDE 5 MG/ML
10 INJECTION, SOLUTION INTRAVENOUS
Status: DISCONTINUED | OUTPATIENT
Start: 2023-05-15 | End: 2023-05-16 | Stop reason: HOSPADM

## 2023-05-15 RX ORDER — SODIUM CHLORIDE, SODIUM LACTATE, POTASSIUM CHLORIDE, CALCIUM CHLORIDE 600; 310; 30; 20 MG/100ML; MG/100ML; MG/100ML; MG/100ML
INJECTION, SOLUTION INTRAVENOUS CONTINUOUS
Status: DISCONTINUED | OUTPATIENT
Start: 2023-05-15 | End: 2023-05-16 | Stop reason: HOSPADM

## 2023-05-15 RX ORDER — FENTANYL CITRATE 50 UG/ML
50 INJECTION, SOLUTION INTRAMUSCULAR; INTRAVENOUS EVERY 5 MIN PRN
Status: DISCONTINUED | OUTPATIENT
Start: 2023-05-15 | End: 2023-05-16 | Stop reason: HOSPADM

## 2023-05-15 RX ORDER — FENTANYL CITRATE 50 UG/ML
25 INJECTION, SOLUTION INTRAMUSCULAR; INTRAVENOUS EVERY 5 MIN PRN
Status: DISCONTINUED | OUTPATIENT
Start: 2023-05-15 | End: 2023-05-16 | Stop reason: HOSPADM

## 2023-05-15 RX ORDER — OXYCODONE HYDROCHLORIDE 5 MG/1
10 TABLET ORAL
Status: DISCONTINUED | OUTPATIENT
Start: 2023-05-15 | End: 2023-05-16 | Stop reason: HOSPADM

## 2023-05-15 RX ORDER — ONDANSETRON 2 MG/ML
4 INJECTION INTRAMUSCULAR; INTRAVENOUS EVERY 30 MIN PRN
Status: DISCONTINUED | OUTPATIENT
Start: 2023-05-15 | End: 2023-05-16 | Stop reason: HOSPADM

## 2023-05-15 RX ORDER — CEFAZOLIN SODIUM 2 G/100ML
2 INJECTION, SOLUTION INTRAVENOUS
Status: COMPLETED | OUTPATIENT
Start: 2023-05-15 | End: 2023-05-15

## 2023-05-15 RX ORDER — ONDANSETRON 4 MG/1
4 TABLET, ORALLY DISINTEGRATING ORAL EVERY 30 MIN PRN
Status: DISCONTINUED | OUTPATIENT
Start: 2023-05-15 | End: 2023-05-16 | Stop reason: HOSPADM

## 2023-05-15 RX ORDER — ACETAMINOPHEN 325 MG/1
975 TABLET ORAL ONCE
Status: COMPLETED | OUTPATIENT
Start: 2023-05-15 | End: 2023-05-15

## 2023-05-15 RX ORDER — HYDRALAZINE HYDROCHLORIDE 20 MG/ML
2.5-5 INJECTION INTRAMUSCULAR; INTRAVENOUS EVERY 10 MIN PRN
Status: DISCONTINUED | OUTPATIENT
Start: 2023-05-15 | End: 2023-05-16 | Stop reason: HOSPADM

## 2023-05-15 RX ORDER — GLYCOPYRROLATE 0.2 MG/ML
INJECTION, SOLUTION INTRAMUSCULAR; INTRAVENOUS PRN
Status: DISCONTINUED | OUTPATIENT
Start: 2023-05-15 | End: 2023-05-15

## 2023-05-15 RX ORDER — CEFAZOLIN SODIUM 2 G/100ML
2 INJECTION, SOLUTION INTRAVENOUS SEE ADMIN INSTRUCTIONS
Status: DISCONTINUED | OUTPATIENT
Start: 2023-05-15 | End: 2023-05-16 | Stop reason: HOSPADM

## 2023-05-15 RX ORDER — KETOROLAC TROMETHAMINE 30 MG/ML
15 INJECTION, SOLUTION INTRAMUSCULAR; INTRAVENOUS
Status: DISCONTINUED | OUTPATIENT
Start: 2023-05-15 | End: 2023-05-16 | Stop reason: HOSPADM

## 2023-05-15 RX ORDER — DEXAMETHASONE SODIUM PHOSPHATE 4 MG/ML
4 INJECTION, SOLUTION INTRA-ARTICULAR; INTRALESIONAL; INTRAMUSCULAR; INTRAVENOUS; SOFT TISSUE
Status: DISCONTINUED | OUTPATIENT
Start: 2023-05-15 | End: 2023-05-16 | Stop reason: HOSPADM

## 2023-05-15 RX ORDER — PROPOFOL 10 MG/ML
INJECTION, EMULSION INTRAVENOUS CONTINUOUS PRN
Status: DISCONTINUED | OUTPATIENT
Start: 2023-05-15 | End: 2023-05-15

## 2023-05-15 RX ORDER — FENTANYL CITRATE 50 UG/ML
25 INJECTION, SOLUTION INTRAMUSCULAR; INTRAVENOUS
Status: DISCONTINUED | OUTPATIENT
Start: 2023-05-15 | End: 2023-05-16 | Stop reason: HOSPADM

## 2023-05-15 RX ORDER — ONDANSETRON 2 MG/ML
INJECTION INTRAMUSCULAR; INTRAVENOUS PRN
Status: DISCONTINUED | OUTPATIENT
Start: 2023-05-15 | End: 2023-05-15

## 2023-05-15 RX ORDER — HALOPERIDOL 5 MG/ML
1 INJECTION INTRAMUSCULAR
Status: DISCONTINUED | OUTPATIENT
Start: 2023-05-15 | End: 2023-05-16 | Stop reason: HOSPADM

## 2023-05-15 RX ORDER — HYDROXYZINE HYDROCHLORIDE 10 MG/1
10 TABLET, FILM COATED ORAL EVERY 6 HOURS PRN
Status: DISCONTINUED | OUTPATIENT
Start: 2023-05-15 | End: 2023-05-16 | Stop reason: HOSPADM

## 2023-05-15 RX ORDER — DIMENHYDRINATE 50 MG/ML
25 INJECTION, SOLUTION INTRAMUSCULAR; INTRAVENOUS
Status: DISCONTINUED | OUTPATIENT
Start: 2023-05-15 | End: 2023-05-16 | Stop reason: HOSPADM

## 2023-05-15 RX ORDER — ALBUTEROL SULFATE 0.83 MG/ML
2.5 SOLUTION RESPIRATORY (INHALATION) EVERY 4 HOURS PRN
Status: DISCONTINUED | OUTPATIENT
Start: 2023-05-15 | End: 2023-05-16 | Stop reason: HOSPADM

## 2023-05-15 RX ORDER — LIDOCAINE 40 MG/G
CREAM TOPICAL
Status: DISCONTINUED | OUTPATIENT
Start: 2023-05-15 | End: 2023-05-16 | Stop reason: HOSPADM

## 2023-05-15 RX ORDER — LORAZEPAM 2 MG/ML
.5-1 INJECTION INTRAMUSCULAR
Status: DISCONTINUED | OUTPATIENT
Start: 2023-05-15 | End: 2023-05-16 | Stop reason: HOSPADM

## 2023-05-15 RX ADMIN — ACETAMINOPHEN 975 MG: 325 TABLET ORAL at 07:06

## 2023-05-15 RX ADMIN — PROPOFOL 30 MG: 10 INJECTION, EMULSION INTRAVENOUS at 07:29

## 2023-05-15 RX ADMIN — ONDANSETRON 4 MG: 2 INJECTION INTRAMUSCULAR; INTRAVENOUS at 07:30

## 2023-05-15 RX ADMIN — FENTANYL CITRATE 25 MCG: 50 INJECTION, SOLUTION INTRAMUSCULAR; INTRAVENOUS at 07:41

## 2023-05-15 RX ADMIN — PROPOFOL 125 MCG/KG/MIN: 10 INJECTION, EMULSION INTRAVENOUS at 07:28

## 2023-05-15 RX ADMIN — GLYCOPYRROLATE 0.2 MG: 0.2 INJECTION, SOLUTION INTRAMUSCULAR; INTRAVENOUS at 07:39

## 2023-05-15 RX ADMIN — CEFAZOLIN SODIUM 2 G: 2 INJECTION, SOLUTION INTRAVENOUS at 07:25

## 2023-05-15 RX ADMIN — FENTANYL CITRATE 25 MCG: 50 INJECTION, SOLUTION INTRAMUSCULAR; INTRAVENOUS at 07:47

## 2023-05-15 RX ADMIN — FENTANYL CITRATE 25 MCG: 50 INJECTION, SOLUTION INTRAMUSCULAR; INTRAVENOUS at 07:29

## 2023-05-15 RX ADMIN — SODIUM CHLORIDE, SODIUM LACTATE, POTASSIUM CHLORIDE, CALCIUM CHLORIDE: 600; 310; 30; 20 INJECTION, SOLUTION INTRAVENOUS at 06:59

## 2023-05-15 RX ADMIN — FENTANYL CITRATE 25 MCG: 50 INJECTION, SOLUTION INTRAMUSCULAR; INTRAVENOUS at 07:46

## 2023-05-15 NOTE — ANESTHESIA CARE TRANSFER NOTE
Patient: Christiana Lopes    Procedure: Procedure(s):  Pubovaginal sling with Altis       Diagnosis: YENNI (stress urinary incontinence, female) [N39.3]  Diagnosis Additional Information: No value filed.    Anesthesia Type:   General     Note:    Oropharynx: spontaneously breathing  Level of Consciousness: awake  Oxygen Supplementation: room air    Independent Airway: airway patency satisfactory and stable  Dentition: dentition unchanged  Vital Signs Stable: post-procedure vital signs reviewed and stable  Report to RN Given: handoff report given  Patient transferred to: Phase II    Handoff Report: Identifed the Patient, Identified the Reponsible Provider, Reviewed the pertinent medical history, Discussed the surgical course, Reviewed Intra-OP anesthesia mangement and issues during anesthesia, Set expectations for post-procedure period and Allowed opportunity for questions and acknowledgement of understanding      Vitals:  Vitals Value Taken Time   BP     Temp     Pulse     Resp     SpO2         Electronically Signed By: LOC Desouza CRNA  May 15, 2023  7:58 AM

## 2023-05-15 NOTE — OP NOTE
Procedure Date: 05/15/2023    PROCEDURE PERFORMED:  Pubovaginal sling (Altis).    PREOPERATIVE DIAGNOSIS:  Stress urinary incontinence with hypermobility.    POSTOPERATIVE DIAGNOSES:  Stress urinary incontinence with hypermobility.    ANESTHESIA:  MAC.    SURGEON:  Ken Murray M.D.     BLOOD LOSS:  5 mL    DRAINS:  None.    COMPLICATIONS:  None.    INDICATIONS FOR PROCEDURE:  This is a 67-year-old white female who has undergone extensive outpatient evaluation for progressive stress urinary incontinence.  Pertinent findings on evaluation include mild hypermobility of the bladder neck with minimal anterior defect.  Cystoscopic findings include normal bladder mucosa, capacity on the order of 500 mL with mild hypermobility and marked demonstrable leakage on Valsalva maneuver.  In light of these findings, Ms. Lopes is offered pubovaginal sling using the Altis technique.  Informed consent was obtained and documented in the outpatient record.    SURGERY IN DETAIL:  The patient was brought to the operating room and carefully positioned supine.  General endotracheal anesthesia was administered without incident.  Prophylactic antibiotics were administered.  Pneumatic stockings were placed.  She was then carefully positioned in low lithotomy, and a sterile prep and drape was performed.  Surgical timeout was performed per protocol.    The vagina was prepared for surgery with a 20 Greek Stubbs catheter and [place] retractor.  Laxity of support at the bladder neck and urethra were again appreciated.  A marking pen was used to delineate the line of the proposed incision over the mid urethra.  The subepithelial tissue was infiltrated with sterile saline.  A #15-blade was used to make a longitudinal incision.  A combination of sharp and blunt dissection was carried back to the ventral portion of the anterior ramus.  This was directed at approximately 2 and 10 o'clock.    At this point, the Altis sling was carefully loaded  onto the right side passing instrument.  The static anchor was then passed into the right obturator membrane at approximately the 10 o'clock position in an external rotational manner.  The introducer was carefully withdrawn in a reverse helical fashion.  Attention was performed to ensure adequate anchoring.  The left side instrument was then carefully secured to the Dynamic anchor, which was carefully freed from the suture with gentle motion.  This anchor was then carefully secured into the patient's left obturator membrane at approximately 2 o'clock, again using external rotation.  At this point, tensioning was performed to ensure that the Altis sling lay appropriately on the urethra with appropriate tension.  Once this was ascertained, the redundant portion of the tensioning suture was transected and removed.  Copious antibiotic irrigation was used.  Meticulous hemostasis was documented.  Closure was performed with a running, locked stitch of 2-0 Vicryl.  A vaginal packing soaked in antibiotic solution was temporarily placed.  The Stubbs catheter and retractor were withdrawn.    Cystoscopy was performed under real time video control with the 70-degree optic.  There was no evidence of mesh or suture in the lower urinary tract.  The bladder was drained and the scope was withdrawn.  The patient tolerated the procedure in a satisfactory manner and was brought to the recovery in stable condition.  There were no operative complications.      Ken Murray MD        D: 05/15/2023   T: 05/15/2023   MT: LASHELL    Name:     KAREN OLIVEIRAMontserrat  MRN:      [id number]        Account:        [id number]   :      1955           Procedure Date: 05/15/2023     Document: [id number]

## 2023-05-15 NOTE — DISCHARGE INSTRUCTIONS
"Post Operative Instructions for Pubovaginal Sling    Congratulations; you are on your way to improved bladder control! Here are a few things to remember as you recover from your surgery.    What your bladder might feel like:    You will likely have some discomfort at first. Many women address their discomfort with regular Tylenol as early as the first day after surgery. A heating pad, placed on the lower abdomen, can also be helpful for general soreness. You can use the prescribed narcotic if you're experiencing strong discomfort, but we strongly encourage you to take as few as absolutely necessary. Avoid the use of aspirin, Motrin, Advil and other over-the-counter drugs for at least one week after surgery as these can cause bleeding during this time period.     Some women report that their urine stream is slower than usual or sprays in a different direction than before; this is part of the normal healing process and will continue to improve with time.    You may feel the need to empty your bladder \"right now.\" You might also feel pelvic pressure when you exert yourself. These generally improve with time and healing.    Incision Care:    You may shower the day after surgery. Do not use baths, hot tubs, or swimming pools for one week.     The small incision in your vaginal is closed with dissolvable stitches. The stitches will fall out on their own in 4-6 weeks. It is normal to have an increased vaginal discharge as the vaginal tissue is healing. Some women also report a stronger vaginal odor during this time.     You may notice vaginal spotting for up to 6 weeks. It can be on/off or ongoing. If it is more than a small amount, call our clinic.     Activity and Restrictions:    Stay close to home for 24 hours after surgery. You may feel a bit weak and may tire a bit more easily. Listen to your body and rest as needed. You may walk about the house as needed and use stairs on a limited basis. Avoid long walks and " exercise. It may be helpful to have someone at [facility] during the day to help with routine chores.    Two days after surgery we encourage return to work and gentle normal activity. Exercise should be avoided for one week.    Exercise:    We encourage return to exercise at the one-week point. Remember to start a bit easy and gradually work back up to your normal exercise program.    Intimacy:    Please avoid placing anyting in your vagina for four weeks. This includes tampons and intercourse. If you have been prescribed premarin cream, you may apply this to the external area with a finger.    Follow-up Appointment:    You should be scheduled for an appointment in 4 weeks. If you do no have this on your calendar, please contact our office. Please arrive with a full bladder as you will be asked to leave a urine sample. During the visit, you ashley be asked how things are going. A gentle pelvic exam will be performed. You will not have any testing done inside your bladder at this appointment.     What if I have questions?    Please contact our office directly if you have any questions.    To contact Dr Murray, please call [phone].        [facility]   Adult Discharge Orders & Instructions     For 24 hours after surgery    Get plenty of rest.  A responsible adult must stay with you for at least 24 hours after you leave the hospital.   Do not drive or use heavy equipment.  If you have weakness or tingling, don't drive or use heavy equipment until this feeling goes away.  Do not drink alcohol.  Avoid strenuous or risky activities.  Ask for help when climbing stairs.   You may feel lightheaded.  IF so, sit for a few minutes before standing.  Have someone help you get up.   If you have nausea (feel sick to your stomach): Drink only clear liquids such as apple juice, ginger ale, broth or 7-Up.  Rest may also help.  Be sure to drink enough fluids.  Move to a regular diet as you feel able.  You may have a  slight fever. Call the doctor if your fever is over 100 F (37.7 C) (taken under the tongue) or lasts longer than 24 hours.  You may have a dry mouth, a sore throat, muscle aches or trouble sleeping.  These should go away after 24 hours.  Do not make important or legal decisions.     Call your doctor for any of the followin.  Signs of infection (fever, growing tenderness at the surgery site, a large amount of drainage or bleeding, severe pain, foul-smelling drainage, redness, swelling).    2.  It has been over 8 to 10 hours since surgery and you are still not able to urinate (pass water).    3.  Headache for over 24 hours.    4.  Signs of Covid-19 infection (temperature over 100 degrees, shortness of breath, cough, loss of taste/smell, generalized body aches, persistent headache,                  chills, sore throat, nausea/vomiting/diarrhea).

## 2023-05-15 NOTE — ANESTHESIA POSTPROCEDURE EVALUATION
Patient: Christiana Lopes    Procedure: Procedure(s):  Pubovaginal sling with Altis       Anesthesia Type:  General    Note:  Disposition: Outpatient   Postop Pain Control: Uneventful            Sign Out: Well controlled pain   PONV: No   Neuro/Psych: Uneventful            Sign Out: Acceptable/Baseline neuro status   Airway/Respiratory: Uneventful            Sign Out: Acceptable/Baseline resp. status   CV/Hemodynamics: Uneventful            Sign Out: Acceptable CV status; No obvious hypovolemia; No obvious fluid overload   Other NRE:    DID A NON-ROUTINE EVENT OCCUR? No           Last vitals:  Vitals Value Taken Time   /94 05/15/23 0939   Temp 97.1  F (36.2  C) 05/15/23 0802   Pulse 51 05/15/23 0939   Resp 16 05/15/23 0930   SpO2 100 % 05/15/23 0942   Vitals shown include unvalidated device data.    Electronically Signed By: Omar Streeter MD  May 15, 2023  2:17 PM

## 2023-05-19 ENCOUNTER — NURSE TRIAGE (OUTPATIENT)
Dept: UROLOGY | Facility: CLINIC | Age: 68
End: 2023-05-19
Payer: COMMERCIAL

## 2023-05-19 NOTE — TELEPHONE ENCOUNTER
Patient called to inquire about some spotting and discharge after altis sling procedure on 5/15. Patient states that she is having occasional spotting on a panty liner. Patient denies fever, chills, nausea, vomiting, foul odor or heavy bleeding. Reassured patient that this is normal after the procedure and can occur up to 6 weeks after while sutures heal. Advised to be seen in clinic if symptoms worsen or if fever develops. Caller agrees.   Angelica Saez RN

## 2023-05-19 NOTE — PROGRESS NOTES
"Patient having some light pink \"spotting\". Concerned if this is normal (had surgery 5 days ago). Instructed to call MD office  "

## 2023-06-13 ENCOUNTER — OFFICE VISIT (OUTPATIENT)
Dept: UROLOGY | Facility: CLINIC | Age: 68
End: 2023-06-13
Payer: COMMERCIAL

## 2023-06-13 VITALS
DIASTOLIC BLOOD PRESSURE: 80 MMHG | HEIGHT: 61 IN | SYSTOLIC BLOOD PRESSURE: 130 MMHG | BODY MASS INDEX: 25.86 KG/M2 | WEIGHT: 137 LBS | TEMPERATURE: 97.4 F

## 2023-06-13 DIAGNOSIS — N39.46 MIXED STRESS AND URGE URINARY INCONTINENCE: Primary | ICD-10-CM

## 2023-06-13 LAB
ALBUMIN UR-MCNC: NEGATIVE MG/DL
APPEARANCE UR: CLEAR
BILIRUB UR QL STRIP: NEGATIVE
COLOR UR AUTO: YELLOW
GLUCOSE UR STRIP-MCNC: NEGATIVE MG/DL
HGB UR QL STRIP: NEGATIVE
KETONES UR STRIP-MCNC: NEGATIVE MG/DL
LEUKOCYTE ESTERASE UR QL STRIP: NEGATIVE
NITRATE UR QL: NEGATIVE
PH UR STRIP: 6 [PH] (ref 5–7)
SP GR UR STRIP: 1.01 (ref 1–1.03)
UROBILINOGEN UR STRIP-MCNC: NORMAL MG/DL

## 2023-06-13 PROCEDURE — 81003 URINALYSIS AUTO W/O SCOPE: CPT | Performed by: UROLOGY

## 2023-06-13 ASSESSMENT — PAIN SCALES - GENERAL: PAINLEVEL: NO PAIN (0)

## 2023-06-13 NOTE — PROGRESS NOTES
"F/u after sling 5/15/23    \"I should have done this years ago.\"    Now completely dry, even with long-distance horseback riding.    Was previously wearing thick pads \"and contemplating going even thicker;\" no with security liner only, rare drops at day's end.    Denies dysuria, gross hematuria, frequency.     Very pleased.      Current Outpatient Medications   Medication     atenolol-chlorthalidone (TENORETIC) 50-25 MG tablet     estradiol (ESTRACE) 0.5 MG tablet     fish oil-omega-3 fatty acids 1000 MG capsule     MAGNESIUM PO     meloxicam (MOBIC) 15 MG tablet     Methylsulfonylmethane (MSM PO)     Multiple Vitamin (MULTIVITAMIN OR)     oxyCODONE (ROXICODONE) 5 MG tablet     potassium chloride ER (K-TAB) 20 MEQ CR tablet     ST JOHNS WORT PO     No current facility-administered medications for this visit.       PE: Excellent support, non-tender      Results for orders placed or performed in visit on 06/13/23   UA reflex to Microscopic     Status: Normal   Result Value Ref Range    Color Urine Yellow Colorless, Straw, Light Yellow, Yellow    Appearance Urine Clear Clear    Glucose Urine Negative Negative mg/dL    Bilirubin Urine Negative Negative    Ketones Urine Negative Negative mg/dL    Specific Gravity Urine 1.012 1.003 - 1.035    Blood Urine Negative Negative    pH Urine 6.0 5.0 - 7.0    Protein Albumin Urine Negative Negative mg/dL    Urobilinogen Urine Normal Normal, 2.0 mg/dL    Nitrite Urine Negative Negative    Leukocyte Esterase Urine Negative Negative    Narrative    Microscopic not indicated       IMP:  1. YENNI resolved with sling      PLAN:  1. Discussed situation with patient in detail.  2. RTC only PRN    "

## 2023-10-28 ENCOUNTER — MYC MEDICAL ADVICE (OUTPATIENT)
Dept: FAMILY MEDICINE | Facility: OTHER | Age: 68
End: 2023-10-28
Payer: COMMERCIAL

## 2023-12-26 ENCOUNTER — MYC MEDICAL ADVICE (OUTPATIENT)
Dept: FAMILY MEDICINE | Facility: OTHER | Age: 68
End: 2023-12-26
Payer: COMMERCIAL

## 2023-12-26 DIAGNOSIS — I10 HYPERTENSION GOAL BP (BLOOD PRESSURE) < 140/90: Primary | ICD-10-CM

## 2023-12-26 NOTE — TELEPHONE ENCOUNTER
Last office visit with you 4/14/23  Advised pt to make an appt. Would you recommend any medication adjustments before then?

## 2023-12-26 NOTE — TELEPHONE ENCOUNTER
Recommend await PCP after patient's last message     Chuck Wilde PA-C  [facility]

## 2023-12-29 RX ORDER — LOSARTAN POTASSIUM 25 MG/1
12.5 TABLET ORAL DAILY
Qty: 15 TABLET | Refills: 0 | Status: SHIPPED | OUTPATIENT
Start: 2023-12-29 | End: 2024-01-11

## 2024-01-11 ENCOUNTER — OFFICE VISIT (OUTPATIENT)
Dept: FAMILY MEDICINE | Facility: OTHER | Age: 69
End: 2024-01-11
Payer: COMMERCIAL

## 2024-01-11 VITALS
SYSTOLIC BLOOD PRESSURE: 152 MMHG | BODY MASS INDEX: 26.43 KG/M2 | WEIGHT: 140 LBS | OXYGEN SATURATION: 98 % | TEMPERATURE: 97.8 F | HEIGHT: 61 IN | RESPIRATION RATE: 16 BRPM | DIASTOLIC BLOOD PRESSURE: 76 MMHG | HEART RATE: 55 BPM

## 2024-01-11 DIAGNOSIS — Z01.818 PRE-OP EXAM: Primary | ICD-10-CM

## 2024-01-11 DIAGNOSIS — I10 HYPERTENSION GOAL BP (BLOOD PRESSURE) < 140/90: ICD-10-CM

## 2024-01-11 DIAGNOSIS — Z01.818 PREOP GENERAL PHYSICAL EXAM: ICD-10-CM

## 2024-01-11 DIAGNOSIS — K08.89 TOOTH PAIN: ICD-10-CM

## 2024-01-11 LAB
ANION GAP SERPL CALCULATED.3IONS-SCNC: 6 MMOL/L (ref 7–15)
BUN SERPL-MCNC: 28.8 MG/DL (ref 8–23)
CALCIUM SERPL-MCNC: 9.5 MG/DL (ref 8.8–10.2)
CHLORIDE SERPL-SCNC: 105 MMOL/L (ref 98–107)
CREAT SERPL-MCNC: 0.94 MG/DL (ref 0.51–0.95)
DEPRECATED HCO3 PLAS-SCNC: 28 MMOL/L (ref 22–29)
EGFRCR SERPLBLD CKD-EPI 2021: 65 ML/MIN/1.73M2
ERYTHROCYTE [DISTWIDTH] IN BLOOD BY AUTOMATED COUNT: 12.5 % (ref 10–15)
GLUCOSE SERPL-MCNC: 97 MG/DL (ref 70–99)
HCT VFR BLD AUTO: 38.5 % (ref 35–47)
HGB BLD-MCNC: 12.6 G/DL (ref 11.7–15.7)
MCH RBC QN AUTO: 31.9 PG (ref 26.5–33)
MCHC RBC AUTO-ENTMCNC: 32.7 G/DL (ref 31.5–36.5)
MCV RBC AUTO: 98 FL (ref 78–100)
PLATELET # BLD AUTO: 362 10E3/UL (ref 150–450)
POTASSIUM SERPL-SCNC: 4.6 MMOL/L (ref 3.4–5.3)
RBC # BLD AUTO: 3.95 10E6/UL (ref 3.8–5.2)
SODIUM SERPL-SCNC: 139 MMOL/L (ref 135–145)
WBC # BLD AUTO: 5.5 10E3/UL (ref 4–11)

## 2024-01-11 PROCEDURE — 85027 COMPLETE CBC AUTOMATED: CPT | Performed by: FAMILY MEDICINE

## 2024-01-11 PROCEDURE — 36415 COLL VENOUS BLD VENIPUNCTURE: CPT | Performed by: FAMILY MEDICINE

## 2024-01-11 PROCEDURE — 99214 OFFICE O/P EST MOD 30 MIN: CPT | Performed by: FAMILY MEDICINE

## 2024-01-11 PROCEDURE — 80048 BASIC METABOLIC PNL TOTAL CA: CPT | Performed by: FAMILY MEDICINE

## 2024-01-11 RX ORDER — POTASSIUM CHLORIDE 1500 MG/1
TABLET, EXTENDED RELEASE ORAL
Qty: 270 TABLET | Refills: 3 | Status: SHIPPED | OUTPATIENT
Start: 2024-01-11 | End: 2024-06-18

## 2024-01-11 RX ORDER — RESPIRATORY SYNCYTIAL VIRUS VACCINE 120MCG/0.5
0.5 KIT INTRAMUSCULAR ONCE
Qty: 1 EACH | Refills: 0 | Status: CANCELLED | OUTPATIENT
Start: 2024-01-11 | End: 2024-01-11

## 2024-01-11 RX ORDER — LOSARTAN POTASSIUM 25 MG/1
12.5 TABLET ORAL 2 TIMES DAILY
Qty: 30 TABLET | Refills: 1 | Status: SHIPPED | OUTPATIENT
Start: 2024-01-11 | End: 2024-01-30

## 2024-01-11 RX ORDER — ATENOLOL AND CHLORTHALIDONE TABLET 50; 25 MG/1; MG/1
1 TABLET ORAL DAILY
Qty: 90 TABLET | Refills: 3 | Status: SHIPPED | OUTPATIENT
Start: 2024-01-11 | End: 2024-01-23

## 2024-01-11 ASSESSMENT — PAIN SCALES - GENERAL: PAINLEVEL: NO PAIN (0)

## 2024-01-11 NOTE — PATIENT INSTRUCTIONS
Preparing for Your Surgery  Getting started  A nurse will call you to review your health history and instructions. They will give you an arrival time based on your scheduled surgery time. Please be ready to share:  Your doctor's clinic name and phone number  Your medical, surgical, and anesthesia history  A list of allergies and sensitivities  A list of medicines, including herbal treatments and over-the-counter drugs  Whether the patient has a legal guardian (ask how to send us the papers in advance)  Please tell us if you're pregnant--or if there's any chance you might be pregnant. Some surgeries may injure a fetus (unborn baby), so they require a pregnancy test. Surgeries that are safe for a fetus don't always need a test, and you can choose whether to have one.   If you have a child who's having surgery, please ask for a copy of Preparing for Your Child's Surgery.    Preparing for surgery  Within 10 to 30 days of surgery: Have a pre-op exam (sometimes called an H&P, or History and Physical). This can be done at a clinic or pre-operative center.  If you're having a , you may not need this exam. Talk to your care team.  At your pre-op exam, talk to your care team about all medicines you take. If you need to stop any medicines before surgery, ask when to start taking them again.  We do this for your safety. Many medicines can make you bleed too much during surgery. Some change how well surgery (anesthesia) drugs work.  Call your insurance company to let them know you're having surgery. (If you don't have insurance, call [phone].)  Call your clinic if there's any change in your health. This includes signs of a cold or flu (sore throat, runny nose, cough, rash, fever). It also includes a scrape or scratch near the surgery site.  If you have questions on the day of surgery, call your hospital or surgery center.  Eating and drinking guidelines  For your safety: Unless your surgeon tells you otherwise,  follow the guidelines below.  Eat and drink as usual until 8 hours before you arrive for surgery. After that, no food or milk.  Drink clear liquids until 2 hours before you arrive. These are liquids you can see through, like water, Gatorade, and Propel Water. They also include plain black coffee and tea (no cream or milk), candy, and breath mints. You can spit out gum when you arrive.  If you drink alcohol: Stop drinking it the night before surgery.  If your care team tells you to take medicine on the morning of surgery, it's okay to take it with a sip of water.  Preventing infection  Shower or bathe the night before and morning of your surgery. Follow the instructions your clinic gave you. (If no instructions, use regular soap.)  Don't shave or clip hair near your surgery site. We'll remove the hair if needed.  Don't smoke or vape the morning of surgery. You may chew nicotine gum up to 2 hours before surgery. A nicotine patch is okay.  Note: Some surgeries require you to completely quit smoking and nicotine. Check with your surgeon.  Your care team will make every effort to keep you safe from infection. We will:  Clean our hands often with soap and water (or an alcohol-based hand rub).  Clean the skin at your surgery site with a special soap that kills germs.  Give you a special gown to keep you warm. (Cold raises the risk of infection.)  Wear special hair covers, masks, gowns and gloves during surgery.  Give antibiotic medicine, if prescribed. Not all surgeries need antibiotics.  What to bring on the day of surgery  Photo ID and insurance card  Copy of your health care directive, if you have one  Glasses and hearing aids (bring cases)  You can't wear contacts during surgery  Inhaler and eye drops, if you use them (tell us about these when you arrive)  CPAP machine or breathing device, if you use them  A few personal items, if spending the night  If you have . . .  A pacemaker, ICD (cardiac defibrillator) or other  implant: Bring the ID card.  An implanted stimulator: Bring the remote control.  A legal guardian: Bring a copy of the certified (court-stamped) guardianship papers.  Please remove any jewelry, including body piercings. Leave jewelry and other valuables at home.  If you're going home the day of surgery  You must have a responsible adult drive you home. They should stay with you overnight as well.  If you don't have someone to stay with you, and you aren't safe to go home alone, we may keep you overnight. Insurance often won't pay for this.  After surgery  If it's hard to control your pain or you need more pain medicine, please call your surgeon's office.  Questions?   If you have any questions for your care team, list them here: _________________________________________________________________________________________________________________________________________________________________________ ____________________________________ ____________________________________ ____________________________________  For informational purposes only. Not to replace the advice of your health care provider. Copyright   2003, 2019 [employer]. All rights reserved. Clinically reviewed by Staci Geronimo MD. [employer] 802523 - REV 12/22.    How to Take Your Medication Before Surgery  - HOLD (do not take) your morning losartan dose on day of surgery  HOLD (do not take) meloxicam for 10 days prior to surgery

## 2024-01-11 NOTE — PROGRESS NOTES
[address]  [facility] [address]  Phone: [phone]  Primary Provider: Jigna Gil  Pre-op Performing Provider: JIGNA GIL      PREOPERATIVE EVALUATION:  Today's date: 1/11/2024    Christiana is a 68 year old, presenting for the following:  Pre-Op Exam        1/11/2024    10:22 AM   Additional Questions   Roomed by Che KING         1/11/2024    10:22 AM   Patient Reported Additional Medications   Patient reports taking the following new medications flaxseed oil, probiotic, loperamids hydrochloride, simethicone       Surgical Information:  Surgery/Procedure: Multiple Tooth Extraction, Multiple Dental Implantation, Dental Arch Insertion  Surgery Location: [facility]  Surgeon:  and   Surgery Date: 1/22/24  Time of Surgery: 12p  Where patient plans to recover: At home with family  Fax number for surgical facility: [phone]    Assessment & Plan     The proposed surgical procedure is considered LOW risk.    Pre-op exam    Tooth pain    Hypertension goal BP (blood pressure) < 140/90  Will increase losartan from 12.5 mg daily to 12.5 mg twice daily.  She will update me next week on her readings and if not improving consider increasing to 25 mg twice daily.     - losartan (COZAAR) 25 MG tablet; Take 0.5 tablets (12.5 mg) by mouth 2 times daily  - atenolol-chlorthalidone (TENORETIC) 50-25 MG tablet; Take 1 tablet by mouth daily  - Basic metabolic panel  (Ca, Cl, CO2, Creat, Gluc, K, Na, BUN); Future  - CBC with platelets; Future  - potassium chloride ER (K-TAB) 20 MEQ CR tablet; TAKE 1 TABLET THREE TIMES A DAY      Antiplatelet or Anticoagulation Medication Instructions:   - Patient is on no antiplatelet or anticoagulation medications.    Additional Medication Instructions:   - ACE/ARB: HOLD on day of surgery (minimum 11 hours for general anesthesia).   - meloxicam (Mobic): HOLD 10 days before surgery.      RECOMMENDATION:  APPROVAL GIVEN to proceed with proposed procedure, without further diagnostic evaluation.    Subjective       HPI related to upcoming procedure: multiple teeth extractions        1/9/2024     6:17 PM   Preop Questions   1. Have you ever had a heart attack or stroke? No   2. Have you ever had surgery on your heart or blood vessels, such as a stent placement, a coronary artery bypass, or surgery on an artery in your head, neck, heart, or legs? No   3. Do you have chest pain with activity? No   4. Do you have a history of  heart failure? No   5. Do you currently have a cold, bronchitis or symptoms of other infection? No   6. Do you have a cough, shortness of breath, or wheezing? No   7. Do you or anyone in your family have previous history of blood clots? YES - father   8. Do you or does anyone in your family have a serious bleeding problem such as prolonged bleeding following surgeries or cuts? No   9. Have you ever had problems with anemia or been told to take iron pills? No   10. Have you had any abnormal blood loss such as black, tarry or bloody stools, or abnormal vaginal bleeding? No   11. Have you ever had a blood transfusion? No   12. Are you willing to have a blood transfusion if it is medically needed before, during, or after your surgery? Yes   13. Have you or any of your relatives ever had problems with anesthesia? No   14. Do you have sleep apnea, excessive snoring or daytime drowsiness? No   15. Do you have any artifical heart valves or other implanted medical devices like a pacemaker, defibrillator, or continuous glucose monitor? No   16. Do you have artificial joints? YES - partial left shoulder replacement   17. Are you allergic to latex? No       Health Care Directive:  Patient does not have a Health Care Directive or Living Will: Discussed advance care planning with patient; however, patient declined at this time.    Preoperative Review of :   reviewed - no record of  "controlled substances prescribed.      Status of Chronic Conditions:  HYPERTENSION - Patient has longstanding history of HTN , currently denies any symptoms referable to elevated blood pressure. Specifically denies chest pain, palpitations, dyspnea, orthopnea, PND or peripheral edema. Blood pressure readings have been slightly elevated, currently adjusting medication.  Home readings 140s/70s. Current medication regimen is as listed below. Patient denies any side effects of medication.     Review of Systems  CONSTITUTIONAL: NEGATIVE for fever, chills, change in weight  INTEGUMENTARY/SKIN: NEGATIVE for worrisome rashes, moles or lesions  EYES: NEGATIVE for vision changes or irritation  ENT/MOUTH: NEGATIVE for ear, mouth and throat problems  RESP: NEGATIVE for significant cough or SOB  CV: NEGATIVE for chest pain, palpitations or peripheral edema  GI: NEGATIVE for nausea, abdominal pain, heartburn, or change in bowel habits  : NEGATIVE for frequency, dysuria, or hematuria  MUSCULOSKELETAL: NEGATIVE for significant arthralgias or myalgia  NEURO: NEGATIVE for weakness, dizziness or paresthesias  ENDOCRINE: NEGATIVE for temperature intolerance, skin/hair changes  HEME: NEGATIVE for bleeding problems  PSYCHIATRIC: NEGATIVE for changes in mood or affect    Patient Active Problem List    Diagnosis Date Noted    YENNI (stress urinary incontinence, female) 04/26/2023     Priority: Medium     Added automatically from request for surgery [id number]      History of cervical cancer 06/26/2013     Priority: Medium     1990 Hysterectomy due to cervical cancer. She was told it went deeper than they expected but they got it all.  10/27/09 NIL pap. Exam notes report, \"Cervix is without inflammation or abnormal discharge\". Continue cervical cancer screening.   8/25/11 NIL pap/neg HR HPV  6/26/13 NIL pap  11/8/16 NIL pap/neg HR HPV.         Hypertension goal BP (blood pressure) < 140/90 12/12/2010     Priority: Medium      Past Medical " History:   Diagnosis Date    Arthritis     ostio    Cervix cancer (H) 01/01/1990    Depression     after 's death    History of left breast biopsy 01/01/2011    negative    HTN     Nonsenile cataract     UTI     history of      Past Surgical History:   Procedure Laterality Date    ARTHROPLASTY SHOULDER Left 02/12/2020    Procedure: Left shoulder hemiarthroplasy with bicep tenodesis;  Surgeon: Nura Cole MD;  Location: [facility] OR    ARTHROSCOPY SHOULDER Left 02/12/2020    Procedure: left shoulder arthroscopy;  Surgeon: Nura Cole MD;  Location: [facility] OR    BACK SURGERY  01/01/2009    BIOPSY BREAST  01/01/2011    benign    BLEPHAROPLASTY Bilateral 04/18/2022    Procedure: BLEPHAROPLASTY;  Surgeon: Lico Ma MD;  Location: [facility] OR    C/SECTION, CLASSICAL      x2    COLONOSCOPY  01/01/2005    COLONOSCOPY N/A 02/12/2018    Procedure: COLONOSCOPY;  COLONOSCOPY;  Surgeon: Ken Fair MD;  Location:  GI    HEAD & NECK SURGERY  01/01/2011    HERNIA REPAIR  01/01/2003    HYSTERECTOMY  01/01/1990    one ovary removed, h/o cervical cancer    REPAIR PTOSIS      SLING TRANSVAGINAL N/A 5/15/2023    Procedure: Pubovaginal sling with Altis;  Surgeon: Ken Murray MD;  Location: [facility] OR    ZZC APPENDECTOMY      ZZC ENLARGE BREAST  01/01/2006    ZZC NONSPECIFIC PROCEDURE  02/01/2004    ventral hernia repair    ZZC NONSPECIFIC PROCEDURE  12/29/2011    Lumbar fusion L5-S1    ZZC NONSPECIFIC PROCEDURE      cervical fusion C3     Current Outpatient Medications   Medication Sig Dispense Refill    atenolol-chlorthalidone (TENORETIC) 50-25 MG tablet Take 1 tablet by mouth daily 90 tablet 3    estradiol (ESTRACE) 0.5 MG tablet Take 1 tablet (0.5 mg) by mouth daily 90 tablet 3    losartan (COZAAR) 25 MG tablet Take 0.5 tablets (12.5 mg) by mouth 2 times daily 30 tablet 1    MAGNESIUM PO       meloxicam (MOBIC) 15 MG tablet TAKE 1 TABLET DAILY AS NEEDED 90 tablet 3    Multiple Vitamin (MULTIVITAMIN  "OR) Take 1 tablet by mouth daily.      potassium chloride ER (K-TAB) 20 MEQ CR tablet TAKE 1 TABLET THREE TIMES A  tablet 3    ST JOHNS WORT PO Take by mouth 2 times daily          No Known Allergies     Social History     Tobacco Use    Smoking status: Former     Packs/day: 1.00     Years: 2.00     Additional pack years: 0.00     Total pack years: 2.00     Types: Cigarettes     Quit date: 2010     Years since quittin.4     Passive exposure: Never    Smokeless tobacco: Never    Tobacco comments:     no use of tobacco products   Substance Use Topics    Alcohol use: Yes     Comment: socially       History   Drug Use No         Objective     BP (!) 152/76   Pulse 55   Temp 97.8  F (36.6  C) (Oral)   Resp 16   Ht 1.545 m (5' 0.83\")   Wt 63.5 kg (140 lb)   SpO2 98%   BMI 26.60 kg/m      Physical Exam    GENERAL APPEARANCE: healthy, alert and no distress     EYES: EOMI, PERRL     HENT: ear canals and TM's normal and nose and mouth without ulcers or lesions     NECK: no adenopathy, no asymmetry, masses, or scars and thyroid normal to palpation     RESP: lungs clear to auscultation - no rales, rhonchi or wheezes     CV: regular rates and rhythm, normal S1 S2, no S3 or S4 and no murmur, click or rub     ABDOMEN:  soft, nontender, no HSM or masses and bowel sounds normal     MS: extremities normal- no gross deformities noted, no evidence of inflammation in joints, FROM in all extremities.     SKIN: no suspicious lesions or rashes     NEURO: Normal strength and tone, sensory exam grossly normal, mentation intact and speech normal     PSYCH: mentation appears normal. and affect normal/bright     LYMPHATICS: No cervical adenopathy    Recent Labs   Lab Test 23  1423 22  1100   HGB  --  11.6*   PLT  --  268    141   POTASSIUM 4.0 4.2   CR 0.88 0.92        Diagnostics:  Labs pending at this time.  Results will be reviewed when available.   No EKG required for low risk surgery (cataract, skin " procedure, breast biopsy, etc).    Revised Cardiac Risk Index (RCRI):  The patient has the following serious cardiovascular risks for perioperative complications:   - No serious cardiac risks = 0 points     RCRI Interpretation: 0 points: Class I (very low risk - 0.4% complication rate)         Signed Electronically by: Jigna Lees MD  Copy of this evaluation report is provided to requesting physician.

## 2024-01-15 ENCOUNTER — MYC MEDICAL ADVICE (OUTPATIENT)
Dept: FAMILY MEDICINE | Facility: OTHER | Age: 69
End: 2024-01-15
Payer: COMMERCIAL

## 2024-01-23 ENCOUNTER — MYC MEDICAL ADVICE (OUTPATIENT)
Dept: FAMILY MEDICINE | Facility: OTHER | Age: 69
End: 2024-01-23
Payer: COMMERCIAL

## 2024-01-23 DIAGNOSIS — M19.012 PRIMARY OSTEOARTHRITIS OF LEFT SHOULDER: ICD-10-CM

## 2024-01-23 DIAGNOSIS — I10 HYPERTENSION GOAL BP (BLOOD PRESSURE) < 140/90: ICD-10-CM

## 2024-01-23 RX ORDER — ATENOLOL AND CHLORTHALIDONE TABLET 50; 25 MG/1; MG/1
1 TABLET ORAL DAILY
Qty: 30 TABLET | Refills: 0 | Status: SHIPPED | OUTPATIENT
Start: 2024-01-23 | End: 2024-04-16

## 2024-01-23 RX ORDER — MELOXICAM 15 MG/1
TABLET ORAL
Qty: 30 TABLET | Refills: 0 | Status: SHIPPED | OUTPATIENT
Start: 2024-01-23 | End: 2024-06-18

## 2024-01-30 ENCOUNTER — TELEPHONE (OUTPATIENT)
Dept: FAMILY MEDICINE | Facility: OTHER | Age: 69
End: 2024-01-30
Payer: COMMERCIAL

## 2024-01-30 DIAGNOSIS — I10 HYPERTENSION GOAL BP (BLOOD PRESSURE) < 140/90: ICD-10-CM

## 2024-01-30 RX ORDER — LOSARTAN POTASSIUM 25 MG/1
12.5 TABLET ORAL 2 TIMES DAILY
Qty: 90 TABLET | Refills: 3 | Status: SHIPPED | OUTPATIENT
Start: 2024-01-30 | End: 2024-03-28

## 2024-02-17 ENCOUNTER — MYC MEDICAL ADVICE (OUTPATIENT)
Dept: FAMILY MEDICINE | Facility: OTHER | Age: 69
End: 2024-02-17
Payer: COMMERCIAL

## 2024-02-28 ENCOUNTER — MYC MEDICAL ADVICE (OUTPATIENT)
Dept: FAMILY MEDICINE | Facility: OTHER | Age: 69
End: 2024-02-28
Payer: COMMERCIAL

## 2024-03-18 ENCOUNTER — MYC MEDICAL ADVICE (OUTPATIENT)
Dept: FAMILY MEDICINE | Facility: OTHER | Age: 69
End: 2024-03-18
Payer: COMMERCIAL

## 2024-03-28 ENCOUNTER — MYC MEDICAL ADVICE (OUTPATIENT)
Dept: FAMILY MEDICINE | Facility: OTHER | Age: 69
End: 2024-03-28
Payer: COMMERCIAL

## 2024-03-28 DIAGNOSIS — I10 HYPERTENSION GOAL BP (BLOOD PRESSURE) < 140/90: ICD-10-CM

## 2024-03-28 RX ORDER — LOSARTAN POTASSIUM 25 MG/1
25 TABLET ORAL 2 TIMES DAILY
Status: SHIPPED
Start: 2024-03-28 | End: 2024-04-15

## 2024-04-09 SDOH — HEALTH STABILITY: PHYSICAL HEALTH: ON AVERAGE, HOW MANY MINUTES DO YOU ENGAGE IN EXERCISE AT THIS LEVEL?: 60 MIN

## 2024-04-09 SDOH — HEALTH STABILITY: PHYSICAL HEALTH: ON AVERAGE, HOW MANY DAYS PER WEEK DO YOU ENGAGE IN MODERATE TO STRENUOUS EXERCISE (LIKE A BRISK WALK)?: 3 DAYS

## 2024-04-09 ASSESSMENT — SOCIAL DETERMINANTS OF HEALTH (SDOH): HOW OFTEN DO YOU GET TOGETHER WITH FRIENDS OR RELATIVES?: THREE TIMES A WEEK

## 2024-04-09 NOTE — COMMUNITY RESOURCES LIST (ENGLISH)
April 9, 2024           YOUR PERSONALIZED LIST OF SERVICES & PROGRAMS               Bill Payment Assistance      of [facility]  [id number] [place] [address] [place] [address] (Distance: 13.9 miles)  Language: English  Fee: Free      [facility] [place] [employer] ([employer])  [address] [place] [address] (Distance: 14.2 miles)  Phone: [phone]  Website: https://Flirtic.com/pqxbyjys-tmowdhzzas-[email or web address]  Language: English  Fee: Free      - Dislocated Worker/Adult [employer] Employment Program  Phone: [phone]) [phone]  Email: segun@[employer]  Website: https://[employer]/services/employment-services/dislocated-worker-program/  Language: English, Serbian  Hours: Mon 8:00 AM - 4:30 PM Tue 8:00 AM - 4:30 PM Wed 8:00 AM - 4:30 PM Thu 8:00 AM - 4:30 PM Fri 8:00 AM - 4:30 PM  Fee: Free  Accessibility: Ada accessible               IMPORTANT NUMBERS & WEBSITES        Emergency Services  911  .   [facility]  211 http://211unitedway.org  .   Poison Control  (664) 506-5250 http://[place]poison.org http://wis[place]poison.org  .     Suicide and Crisis Lifeline  988 http://988lifeline.org  .   [employer] [place] Child Abuse Hotline  785.574.3945 http://Childhelphotline.org   .   National Sexual Assault Hotline  (779) 770-8325 (HOPE) http://Rainn.org   .     National Runaway Safeline  (332) 180-3145 (RUNAWAY) http://1800runaPretio Interactive.org  .   Pregnancy & Postpartum Support  Call/text 744-873-8148  [place]: http://ppsupportmn.org  [place]: http://[employer].com/wi  .   Substance Abuse National Helpline ([facility])  682-097-HELP (6347) http://Findtreatment.gov   .                DISCLAIMER: These resources have been generated via the [employer] Platform. [employer] does not endorse any service providers mentioned in this resource list. [employer] does not guarantee that the services mentioned in this resource list will be available to you or will improve your health or  wellness.    [facility]

## 2024-04-15 ENCOUNTER — MYC REFILL (OUTPATIENT)
Dept: FAMILY MEDICINE | Facility: OTHER | Age: 69
End: 2024-04-15
Payer: COMMERCIAL

## 2024-04-15 DIAGNOSIS — I10 HYPERTENSION GOAL BP (BLOOD PRESSURE) < 140/90: ICD-10-CM

## 2024-04-15 RX ORDER — LOSARTAN POTASSIUM 25 MG/1
25 TABLET ORAL 2 TIMES DAILY
Qty: 180 TABLET | Refills: 0 | Status: SHIPPED | OUTPATIENT
Start: 2024-04-15 | End: 2024-04-16

## 2024-04-16 ENCOUNTER — OFFICE VISIT (OUTPATIENT)
Dept: FAMILY MEDICINE | Facility: OTHER | Age: 69
End: 2024-04-16
Payer: COMMERCIAL

## 2024-04-16 ENCOUNTER — MYC MEDICAL ADVICE (OUTPATIENT)
Dept: FAMILY MEDICINE | Facility: OTHER | Age: 69
End: 2024-04-16

## 2024-04-16 VITALS
HEART RATE: 46 BPM | BODY MASS INDEX: 26.13 KG/M2 | HEIGHT: 62 IN | TEMPERATURE: 97.7 F | OXYGEN SATURATION: 99 % | DIASTOLIC BLOOD PRESSURE: 70 MMHG | RESPIRATION RATE: 16 BRPM | WEIGHT: 142 LBS | SYSTOLIC BLOOD PRESSURE: 134 MMHG

## 2024-04-16 DIAGNOSIS — G89.29 CHRONIC PAIN OF RIGHT KNEE: ICD-10-CM

## 2024-04-16 DIAGNOSIS — M25.561 CHRONIC PAIN OF RIGHT KNEE: ICD-10-CM

## 2024-04-16 DIAGNOSIS — N95.1 MENOPAUSAL SYNDROME (HOT FLASHES): ICD-10-CM

## 2024-04-16 DIAGNOSIS — Z00.00 ENCOUNTER FOR MEDICARE ANNUAL WELLNESS EXAM: Primary | ICD-10-CM

## 2024-04-16 DIAGNOSIS — B35.1 ONYCHOMYCOSIS: Primary | ICD-10-CM

## 2024-04-16 DIAGNOSIS — Z79.899 ENCOUNTER FOR LONG-TERM (CURRENT) USE OF MEDICATIONS: ICD-10-CM

## 2024-04-16 DIAGNOSIS — E78.2 MIXED HYPERLIPIDEMIA: ICD-10-CM

## 2024-04-16 DIAGNOSIS — I10 HYPERTENSION GOAL BP (BLOOD PRESSURE) < 140/90: ICD-10-CM

## 2024-04-16 PROBLEM — N39.3 SUI (STRESS URINARY INCONTINENCE, FEMALE): Status: RESOLVED | Noted: 2023-04-26 | Resolved: 2024-04-16

## 2024-04-16 LAB
ALBUMIN SERPL BCG-MCNC: 4.5 G/DL (ref 3.5–5.2)
ALP SERPL-CCNC: 42 U/L (ref 40–150)
ALT SERPL W P-5'-P-CCNC: 19 U/L (ref 0–50)
ANION GAP SERPL CALCULATED.3IONS-SCNC: 9 MMOL/L (ref 7–15)
AST SERPL W P-5'-P-CCNC: 23 U/L (ref 0–45)
BILIRUB DIRECT SERPL-MCNC: <0.2 MG/DL (ref 0–0.3)
BILIRUB SERPL-MCNC: 0.2 MG/DL
BUN SERPL-MCNC: 21.2 MG/DL (ref 8–23)
CALCIUM SERPL-MCNC: 10 MG/DL (ref 8.8–10.2)
CHLORIDE SERPL-SCNC: 102 MMOL/L (ref 98–107)
CHOLEST SERPL-MCNC: 245 MG/DL
CREAT SERPL-MCNC: 0.9 MG/DL (ref 0.51–0.95)
DEPRECATED HCO3 PLAS-SCNC: 27 MMOL/L (ref 22–29)
EGFRCR SERPLBLD CKD-EPI 2021: 68 ML/MIN/1.73M2
FASTING STATUS PATIENT QL REPORTED: NO
GLUCOSE SERPL-MCNC: 96 MG/DL (ref 70–99)
HDLC SERPL-MCNC: 85 MG/DL
LDLC SERPL CALC-MCNC: 123 MG/DL
NONHDLC SERPL-MCNC: 160 MG/DL
POTASSIUM SERPL-SCNC: 4.5 MMOL/L (ref 3.4–5.3)
PROT SERPL-MCNC: 7.4 G/DL (ref 6.4–8.3)
SODIUM SERPL-SCNC: 138 MMOL/L (ref 135–145)
TRIGL SERPL-MCNC: 184 MG/DL

## 2024-04-16 PROCEDURE — 99397 PER PM REEVAL EST PAT 65+ YR: CPT | Performed by: FAMILY MEDICINE

## 2024-04-16 PROCEDURE — 80061 LIPID PANEL: CPT | Performed by: FAMILY MEDICINE

## 2024-04-16 PROCEDURE — 80053 COMPREHEN METABOLIC PANEL: CPT | Performed by: FAMILY MEDICINE

## 2024-04-16 PROCEDURE — 82248 BILIRUBIN DIRECT: CPT | Performed by: FAMILY MEDICINE

## 2024-04-16 PROCEDURE — 99214 OFFICE O/P EST MOD 30 MIN: CPT | Mod: 25 | Performed by: FAMILY MEDICINE

## 2024-04-16 PROCEDURE — 36415 COLL VENOUS BLD VENIPUNCTURE: CPT | Performed by: FAMILY MEDICINE

## 2024-04-16 RX ORDER — ESTRADIOL 0.5 MG/1
0.5 TABLET ORAL DAILY
Qty: 90 TABLET | Refills: 3 | Status: SHIPPED | OUTPATIENT
Start: 2024-04-16 | End: 2024-06-18

## 2024-04-16 RX ORDER — RESPIRATORY SYNCYTIAL VIRUS VACCINE 120MCG/0.5
0.5 KIT INTRAMUSCULAR ONCE
Qty: 1 EACH | Refills: 0 | Status: CANCELLED | OUTPATIENT
Start: 2024-04-16 | End: 2024-04-16

## 2024-04-16 RX ORDER — LOSARTAN POTASSIUM 25 MG/1
25 TABLET ORAL 2 TIMES DAILY
Qty: 180 TABLET | Refills: 3 | Status: SHIPPED | OUTPATIENT
Start: 2024-04-16 | End: 2024-05-10

## 2024-04-16 RX ORDER — CHLORHEXIDINE GLUCONATE ORAL RINSE 1.2 MG/ML
15 SOLUTION DENTAL
COMMUNITY
Start: 2024-01-19

## 2024-04-16 RX ORDER — TERBINAFINE HYDROCHLORIDE 250 MG/1
250 TABLET ORAL DAILY
Qty: 90 TABLET | Refills: 0 | Status: SHIPPED | OUTPATIENT
Start: 2024-04-16 | End: 2024-05-10

## 2024-04-16 RX ORDER — ATENOLOL AND CHLORTHALIDONE TABLET 50; 25 MG/1; MG/1
1 TABLET ORAL DAILY
Qty: 90 TABLET | Refills: 3 | Status: SHIPPED | OUTPATIENT
Start: 2024-04-16 | End: 2024-06-18

## 2024-04-16 ASSESSMENT — PAIN SCALES - GENERAL: PAINLEVEL: NO PAIN (0)

## 2024-04-16 NOTE — PATIENT INSTRUCTIONS
Preventive Care Advice   This is general advice given by our system to help you stay healthy. However, your care team may have specific advice just for you. Please talk to your care team about your preventive care needs.  Nutrition  Eat 5 or more servings of fruits and vegetables each day.  Try wheat bread, brown rice and whole grain pasta (instead of white bread, rice, and pasta).  Get enough calcium and vitamin D. Check the label on foods and aim for 100% of the RDA (recommended daily allowance).  Lifestyle  Exercise at least 150 minutes each week   (30 minutes a day, 5 days a week).  Do muscle strengthening activities 2 days a week. These help control your weight and prevent disease.  No smoking.  Wear sunscreen to prevent skin cancer.  Have a dental exam and cleaning every 6 months.  Yearly exams  See your health care team every year to talk about:  Any changes in your health.  Any medicines your care team has prescribed.  Preventive care, family planning, and ways to prevent chronic diseases.  Shots (vaccines)   HPV shots (up to age 26), if you've never had them before.  Hepatitis B shots (up to age 59), if you've never had them before.  COVID-19 shot: Get this shot when it's due.  Flu shot: Get a flu shot every year.  Tetanus shot: Get a tetanus shot every 10 years.  Pneumococcal, hepatitis A, and RSV shots: Ask your care team if you need these based on your risk.  Shingles shot (for age 50 and up).  General health tests  Diabetes screening:  Starting at age 35, Get screened for diabetes at least every 3 years.  If you are younger than age 35, ask your care team if you should be screened for diabetes.  Cholesterol test: At age 39, start having a cholesterol test every 5 years, or more often if advised.  Bone density scan (DEXA): At age 50, ask your care team if you should have this scan for osteoporosis (brittle bones).  Hepatitis C: Get tested at least once in your life.  STIs (sexually transmitted  infections)  Before age 24: Ask your care team if you should be screened for STIs.  After age 24: Get screened for STIs if you're at risk. You are at risk for STIs (including HIV) if:  You are sexually active with more than one person.  You don't use condoms every time.  You or a partner was diagnosed with a sexually transmitted infection.  If you are at risk for HIV, ask about PrEP medicine to prevent HIV.  Get tested for HIV at least once in your life, whether you are at risk for HIV or not.  Cancer screening tests  Cervical cancer screening: If you have a cervix, begin getting regular cervical cancer screening tests at age 21. Most people who have regular screenings with normal results can stop after age 65. Talk about this with your provider.  Breast cancer scan (mammogram): If you've ever had breasts, begin having regular mammograms starting at age 40. This is a scan to check for breast cancer.  Colon cancer screening: It is important to start screening for colon cancer at age 45.  Have a colonoscopy test every 10 years (or more often if you're at risk) Or, ask your provider about stool tests like a FIT test every year or Cologuard test every 3 years.  To learn more about your testing options, visit: [email or web address].  For help making a decision, visit: https://bit.ly/ra53990.  Prostate cancer screening test: If you have a prostate and are age 55 to 69, ask your provider if you would benefit from a yearly prostate cancer screening test.  Lung cancer screening: If you are a current or former smoker age 50 to 80, ask your care team if ongoing lung cancer screenings are right for you.  For informational purposes only. Not to replace the advice of your health care provider. Copyright   2023 [employer]. All rights reserved. Clinically reviewed by the [facility]. [employer] [id number] - REV 01/24.    Hearing Loss: Care Instructions  Overview     Hearing loss is a  sudden or slow decrease in how well you hear. It can range from slight to profound. Permanent hearing loss can occur with aging. It also can happen when you are exposed long-term to loud noise. Examples include listening to loud music, riding motorcycles, or being around other loud machines.  Hearing loss can affect your work and home life. It can make you feel lonely or depressed. You may feel that you have lost your independence. But hearing aids and other devices can help you hear better and feel connected to others.  Follow-up care is a key part of your treatment and safety. Be sure to make and go to all appointments, and call your doctor if you are having problems. It's also a good idea to know your test results and keep a list of the medicines you take.  How can you care for yourself at home?  Avoid loud noises whenever possible. This helps keep your hearing from getting worse.  Always wear hearing protection around loud noises.  Wear a hearing aid as directed.  A professional can help you pick a hearing aid that will work best for you.  You can also get hearing aids over the counter for mild to moderate hearing loss.  Have hearing tests as your doctor suggests. They can show whether your hearing has changed. Your hearing aid may need to be adjusted.  Use other devices as needed. These may include:  Telephone amplifiers and hearing aids that can connect to a television, stereo, radio, or microphone.  Devices that use lights or vibrations. These alert you to the doorbell, a ringing telephone, or a baby monitor.  Television closed-captioning. This shows the words at the bottom of the screen. Most new TVs can do this.  TTY (text telephone). This lets you type messages back and forth on the telephone instead of talking or listening. These devices are also called TDD. When messages are typed on the keyboard, they are sent over the phone line to a receiving TTY. The message is shown on a monitor.  Use text  "messaging, social media, and email if it is hard for you to communicate by telephone.  Try to learn a listening technique called speechreading. It is not lipreading. You pay attention to people's gestures, expressions, posture, and tone of voice. These clues can help you understand what a person is saying. Face the person you are talking to, and have them face you. Make sure the lighting is good. You need to see the other person's face clearly.  Think about counseling if you need help to adjust to your hearing loss.  When should you call for help?  Watch closely for changes in your health, and be sure to contact your doctor if:    You think your hearing is getting worse.     You have new symptoms, such as dizziness or nausea.   Where can you learn more?  Go to https://www.[employer].net/patiented  Enter R798 in the search box to learn more about \"Hearing Loss: Care Instructions.\"  Current as of: September 27, 2023               Content Version: 14.0    2659-3307 [email or web address].   Care instructions adapted under license by your healthcare professional. If you have questions about a medical condition or this instruction, always ask your healthcare professional. [employer] disclaims any warranty or liability for your use of this information.      Learning About Stress  What is stress?     Stress is your body's response to a hard situation. Your body can have a physical, emotional, or mental response. Stress is a fact of life for most people, and it affects everyone differently. What causes stress for you may not be stressful for someone else.  A lot of things can cause stress. You may feel stress when you go on a job interview, take a test, or run a race. This kind of short-term stress is normal and even useful. It can help you if you need to work hard or react quickly. For example, stress can help you finish an important job on time.  Long-term stress is caused by ongoing stressful situations " or events. Examples of long-term stress include long-term health problems, ongoing problems at work, or conflicts in your family. Long-term stress can harm your health.  How does stress affect your health?  When you are stressed, your body responds as though you are in danger. It makes hormones that speed up your heart, make you breathe faster, and give you a burst of energy. This is called the fight-or-flight stress response. If the stress is over quickly, your body goes back to normal and no harm is done.  But if stress happens too often or lasts too long, it can have bad effects. Long-term stress can make you more likely to get sick, and it can make symptoms of some diseases worse. If you tense up when you are stressed, you may develop neck, shoulder, or low back pain. Stress is linked to high blood pressure and heart disease.  Stress also harms your emotional health. It can make you mcgee, tense, or depressed. Your relationships may suffer, and you may not do well at work or school.  What can you do to manage stress?  You can try these things to help manage stress:   Do something active. Exercise or activity can help reduce stress. Walking is a great way to get started. Even everyday activities such as housecleaning or yard work can help.  Try yoga or kvng chi. These techniques combine exercise and meditation. You may need some training at first to learn them.  Do something you enjoy. For example, listen to music or go to a movie. Practice your hobby or do volunteer work.  Meditate. This can help you relax, because you are not worrying about what happened before or what may happen in the future.  Do guided imagery. Imagine yourself in any setting that helps you feel calm. You can use online videos, books, or a teacher to guide you.  Do breathing exercises. For example:  From a standing position, bend forward from the waist with your knees slightly bent. Let your arms dangle close to the floor.  Breathe in slowly  "and deeply as you return to a standing position. Roll up slowly and lift your head last.  Hold your breath for just a few seconds in the standing position.  Breathe out slowly and bend forward from the waist.  Let your feelings out. Talk, laugh, cry, and express anger when you need to. Talking with supportive friends or family, a counselor, or a robb leader about your feelings is a healthy way to relieve stress. Avoid discussing your feelings with people who make you feel worse.  Write. It may help to write about things that are bothering you. This helps you find out how much stress you feel and what is causing it. When you know this, you can find better ways to cope.  What can you do to prevent stress?  You might try some of these things to help prevent stress:  Manage your time. This helps you find time to do the things you want and need to do.  Get enough sleep. Your body recovers from the stresses of the day while you are sleeping.  Get support. Your family, friends, and community can make a difference in how you experience stress.  Limit your news feed. Avoid or limit time on social media or news that may make you feel stressed.  Do something active. Exercise or activity can help reduce stress. Walking is a great way to get started.  Where can you learn more?  Go to https://www.[employer].net/patiented  Enter N032 in the search box to learn more about \"Learning About Stress.\"  Current as of: October 24, 2023               Content Version: 14.0    0879-1181 [employer].   Care instructions adapted under license by your healthcare professional. If you have questions about a medical condition or this instruction, always ask your healthcare professional. [employer] disclaims any warranty or liability for your use of this information.      Bladder Training: Care Instructions  Your Care Instructions     Bladder training is used to treat urge incontinence and stress incontinence. Urge " incontinence means that the need to urinate comes on so fast that you can't get to a toilet in time. Stress incontinence means that you leak urine because of pressure on your bladder. For example, it may happen when you laugh, cough, or lift something heavy.  Bladder training can increase how long you can wait before you have to urinate. It can also help your bladder hold more urine. And it can give you better control over the urge to urinate.  It is important to remember that bladder training takes a few weeks to a few months to make a difference. You may not see results right away, but don't give up.  Follow-up care is a key part of your treatment and safety. Be sure to make and go to all appointments, and call your doctor if you are having problems. It's also a good idea to know your test results and keep a list of the medicines you take.  How can you care for yourself at home?  Work with your doctor to come up with a bladder training program that is right for you. You may use one or more of the following methods.  Delayed urination  In the beginning, try to keep from urinating for 5 minutes after you first feel the need to go.  While you wait, take deep, slow breaths to relax. Kegel exercises can also help you delay the need to go to the bathroom.  After some practice, when you can easily wait 5 minutes to urinate, try to wait 10 minutes before you urinate.  Slowly increase the waiting period until you are able to control when you have to urinate.  Scheduled urination  Empty your bladder when you first wake up in the morning.  Schedule times throughout the day when you will urinate.  Start by going to the bathroom every hour, even if you don't need to go.  Slowly increase the time between trips to the bathroom.  When you have found a schedule that works well for you, keep doing it.  If you wake up during the night and have to urinate, do it. Apply your schedule to waking hours only.  Kegel exercises  These  "tighten and strengthen pelvic muscles, which can help you control the flow of urine. (If doing these exercises causes pain, stop doing them and talk with your doctor.) To do Kegel exercises:  Squeeze your muscles as if you were trying not to pass gas. Or squeeze your muscles as if you were stopping the flow of urine. Your belly, legs, and buttocks shouldn't move.  Hold the squeeze for 3 seconds, then relax for 5 to 10 seconds.  Start with 3 seconds, then add 1 second each week until you are able to squeeze for 10 seconds.  Repeat the exercise 10 times a session. Do 3 to 8 sessions a day.  When should you call for help?  Watch closely for changes in your health, and be sure to contact your doctor if:    Your incontinence is getting worse.     You do not get better as expected.   Where can you learn more?  Go to https://www.[employer].net/patiented  Enter V684 in the search box to learn more about \"Bladder Training: Care Instructions.\"  Current as of: November 15, 2023               Content Version: 14.0    9123-1906 [employer].   Care instructions adapted under license by your healthcare professional. If you have questions about a medical condition or this instruction, always ask your healthcare professional. [employer] disclaims any warranty or liability for your use of this information.      "

## 2024-04-16 NOTE — TELEPHONE ENCOUNTER
See mychart message.  Please advise if needing a visit for this concern.  She saw you today but forgot to ask about treatment for her toenail fungus;  you had treated her spouse.   [facility] [place]  Thank you.  Tia GARCIA RN

## 2024-04-16 NOTE — PROGRESS NOTES
"Preventive Care Visit  [facility]  Jigna Lees MD, Family Medicine  Apr 16, 2024      Assessment & Plan     Encounter for Medicare annual wellness exam    Hypertension goal BP (blood pressure) < 140/90  Well-controlled.  Refills given.  Labs drawn.    - losartan (COZAAR) 25 MG tablet; Take 1 tablet (25 mg) by mouth 2 times daily  - atenolol-chlorthalidone (TENORETIC) 50-25 MG tablet; Take 1 tablet by mouth daily  - Basic metabolic panel  (Ca, Cl, CO2, Creat, Gluc, K, Na, BUN)  - Lipid panel reflex to direct LDL Non-fasting    Menopausal syndrome (hot flashes)  Controlled on estrogen.  Refills given.    - estradiol (ESTRACE) 0.5 MG tablet; Take 1 tablet (0.5 mg) by mouth daily    Chronic pain of right knee  She complains of chronic pain of her right knee.  She feels this is worsening.  She would like to see if there are alternative methods of pain relief than surgery.  Will refer to orthopedics.    - Orthopedic  Referral; Future    BMI  Estimated body mass index is 25.97 kg/m  as calculated from the following:    Height as of this encounter: 1.575 m (5' 2.01\").    Weight as of this encounter: 64.4 kg (142 lb).       Counseling  Appropriate preventive services were discussed with this patient, including applicable screening as appropriate for fall prevention, nutrition, physical activity, Tobacco-use cessation, weight loss and cognition.  Checklist reviewing preventive services available has been given to the patient.  Reviewed patient's diet, addressing concerns and/or questions.   She is at risk for lack of exercise and has been provided with information to increase physical activity for the benefit of her well-being.   She is at risk for psychosocial distress and has been provided with information to reduce risk.   The patient was provided with written information regarding signs of hearing loss.   Information on urinary incontinence and treatment options given to " patient.           Maria T Villeda is a 68 year old, presenting for the following:  Medicare Visit        4/16/2024     1:34 PM   Additional Questions   Roomed by chauncey batres         Health Care Directive  Patient does not have a Health Care Directive or Living Will: Discussed advance care planning with patient; information given to patient to review.    HPI        4/9/2024   General Health   How would you rate your overall physical health? (!) FAIR   Feel stress (tense, anxious, or unable to sleep) Only a little   (!) STRESS CONCERN      4/9/2024   Nutrition   Diet: Other   If other, please elaborate: No restrictions         4/9/2024   Exercise   Days per week of moderate/strenous exercise 3 days   Average minutes spent exercising at this level 60 min         4/9/2024   Social Factors   Frequency of gathering with friends or relatives Three times a week   Worry food won't last until get money to buy more No   Food not last or not have enough money for food? No   Do you have housing?  Yes   Are you worried about losing your housing? No   Lack of transportation? No   Unable to get utilities (heat,electricity)? Yes   Want help with housing or utility concern? No   (!) FINANCIAL RESOURCE STRAIN CONCERN      4/15/2024   Fall Risk   Fallen 2 or more times in the past year? No   Trouble with walking or balance? No          4/9/2024   Activities of Daily Living- Home Safety   Needs help with the following daily activites None of the above   Safety concerns in the home None of the above         4/9/2024   Dental   Dentist two times every year? Yes         4/9/2024   Hearing Screening   Hearing concerns? (!) I NEED TO ASK PEOPLE TO SPEAK UP OR REPEAT THEMSELVES.    (!) IT'S HARD TO FOLLOW A CONVERSATION IN A NOISY RESTAURANT OR CROWDED ROOM.         4/9/2024   Driving Risk Screening   Patient/family members have concerns about driving No         4/9/2024   General Alertness/Fatigue Screening   Have you been more tired than  usual lately? No         2024   Urinary Incontinence Screening   Bothered by leaking urine in past 6 months Yes         2024   TB Screening   Were you born outside of the [place]? No         Today's PHQ-2 Score:       4/15/2024     6:40 PM   PHQ-2 (  [employer])   Q1: Little interest or pleasure in doing things 0   Q2: Feeling down, depressed or hopeless 0   PHQ-2 Score 0   Q1: Little interest or pleasure in doing things Not at all   Q2: Feeling down, depressed or hopeless Not at all   PHQ-2 Score 0           2024   Substance Use   Alcohol more than 3/day or more than 7/wk No   Do you have a current opioid prescription? No   How severe/bad is pain from 1 to 10? 7/10   Do you use any other substances recreationally? No     Social History     Tobacco Use    Smoking status: Former     Current packs/day: 0.00     Average packs/day: 1 pack/day for 2.0 years (2.0 ttl pk-yrs)     Types: Cigarettes     Start date: 2008     Quit date: 2010     Years since quittin.7     Passive exposure: Never    Smokeless tobacco: Never    Tobacco comments:     no use of tobacco products   Vaping Use    Vaping status: Never Used   Substance Use Topics    Alcohol use: Yes     Comment: Socially    Drug use: Never           2023   LAST FHS-7 RESULTS   1st degree relative breast or ovarian cancer No   Any relative bilateral breast cancer No   Any male have breast cancer No   Any ONE woman have BOTH breast AND ovarian cancer No   Any woman with breast cancer before 50yrs No   2 or more relatives with breast AND/OR ovarian cancer No   2 or more relatives with breast AND/OR bowel cancer No        Mammogram Screening - Mammogram every 1-2 years updated in Health Maintenance based on mutual decision making    ASCVD Risk   The 10-year ASCVD risk score (Dana GIBBONS, et al., 2019) is: 13.1%    Values used to calculate the score:      Age: 68 years      Sex: Female      Is Non- : No      Diabetic:  "No      Tobacco smoker: No      Systolic Blood Pressure: 134 mmHg      Is BP treated: Yes      HDL Cholesterol: 85 mg/dL      Total Cholesterol: 223 mg/dL            Reviewed and updated as needed this visit by Provider   Tobacco  Allergies  Meds  Problems  Med Hx  Surg Hx  Fam Hx            Current providers sharing in care for this patient include:  Patient Care Team:  Jigna Lees MD as PCP - General (Family Practice)  Jigna Lees MD as Assigned PCP  Ken Murray MD as MD (Urology)  Ken Murray MD as Assigned Surgical Provider    The following health maintenance items are reviewed in Epic and correct as of today:  Health Maintenance   Topic Date Due    RSV VACCINE (Pregnancy & 60+) (1 - 1-dose 60+ series) Never done    ANNUAL REVIEW OF HM ORDERS  04/14/2024    BMP  01/11/2025    MEDICARE ANNUAL WELLNESS VISIT  04/16/2025    FALL RISK ASSESSMENT  04/16/2025    MAMMO SCREENING  05/09/2025    GLUCOSE  01/11/2027    LIPID  04/12/2027    COLORECTAL CANCER SCREENING  02/12/2028    ADVANCE CARE PLANNING  04/14/2028    DTAP/TDAP/TD IMMUNIZATION (3 - Td or Tdap) 08/20/2031    DEXA  03/13/2034    HEPATITIS C SCREENING  Completed    PHQ-2 (once per calendar year)  Completed    INFLUENZA VACCINE  Completed    Pneumococcal Vaccine: 65+ Years  Completed    ZOSTER IMMUNIZATION  Completed    COVID-19 Vaccine  Completed    IPV IMMUNIZATION  Aged Out    HPV IMMUNIZATION  Aged Out    MENINGITIS IMMUNIZATION  Aged Out    RSV MONOCLONAL ANTIBODY  Aged Out    LUNG CANCER SCREENING  Discontinued          Objective    Exam  /70 (BP Location: Right arm, Patient Position: Sitting, Cuff Size: Adult Regular)   Pulse (!) 46   Temp 97.7  F (36.5  C) (Temporal)   Resp 16   Ht 1.575 m (5' 2.01\")   Wt 64.4 kg (142 lb)   SpO2 99%   BMI 25.97 kg/m     Estimated body mass index is 25.97 kg/m  as calculated from the following:    Height as of this encounter: 1.575 m (5' 2.01\").    Weight as " of this encounter: 64.4 kg (142 lb).    Physical Exam  Constitutional:       General: She is not in acute distress.     Appearance: She is well-developed.   HENT:      Right Ear: Tympanic membrane and external ear normal.      Left Ear: Tympanic membrane and external ear normal.      Nose: Nose normal.   Eyes:      General:         Right eye: No discharge.         Left eye: No discharge.      Conjunctiva/sclera: Conjunctivae normal.   Neck:      Thyroid: No thyroid mass.   Cardiovascular:      Rate and Rhythm: Normal rate and regular rhythm.      Heart sounds: Normal heart sounds, S1 normal and S2 normal. No murmur heard.  Pulmonary:      Effort: Pulmonary effort is normal. No respiratory distress.      Breath sounds: Normal breath sounds. No wheezing or rales.   Abdominal:      General: Bowel sounds are normal.      Palpations: Abdomen is soft. There is no mass.      Tenderness: There is no abdominal tenderness.   Musculoskeletal:         General: Normal range of motion.      Cervical back: Neck supple.   Lymphadenopathy:      Cervical: No cervical adenopathy.   Skin:     General: Skin is warm and dry.      Findings: No rash.   Neurological:      Mental Status: She is alert and oriented to person, place, and time.   Psychiatric:         Mood and Affect: Mood normal.         Behavior: Behavior normal.         Thought Content: Thought content normal.         Judgment: Judgment normal.               4/16/2024   Mini Cog   Clock Draw Score 2 Normal   3 Item Recall 3 objects recalled   Mini Cog Total Score 5              Signed Electronically by: Jigna Lees MD

## 2024-04-17 RX ORDER — ATORVASTATIN CALCIUM 20 MG/1
20 TABLET, FILM COATED ORAL DAILY
Qty: 90 TABLET | Refills: 3 | Status: SHIPPED | OUTPATIENT
Start: 2024-04-17 | End: 2024-06-18

## 2024-04-19 ENCOUNTER — TRANSFERRED RECORDS (OUTPATIENT)
Dept: HEALTH INFORMATION MANAGEMENT | Facility: CLINIC | Age: 69
End: 2024-04-19
Payer: COMMERCIAL

## 2024-05-02 ENCOUNTER — MYC MEDICAL ADVICE (OUTPATIENT)
Dept: FAMILY MEDICINE | Facility: OTHER | Age: 69
End: 2024-05-02

## 2024-05-02 ENCOUNTER — OFFICE VISIT (OUTPATIENT)
Dept: FAMILY MEDICINE | Facility: OTHER | Age: 69
End: 2024-05-02
Payer: COMMERCIAL

## 2024-05-02 VITALS
WEIGHT: 140 LBS | HEIGHT: 62 IN | OXYGEN SATURATION: 98 % | RESPIRATION RATE: 16 BRPM | SYSTOLIC BLOOD PRESSURE: 120 MMHG | HEART RATE: 55 BPM | DIASTOLIC BLOOD PRESSURE: 60 MMHG | BODY MASS INDEX: 25.76 KG/M2 | TEMPERATURE: 98.2 F

## 2024-05-02 DIAGNOSIS — H33.312 RETINAL TEAR OF LEFT EYE: ICD-10-CM

## 2024-05-02 DIAGNOSIS — I10 HYPERTENSION GOAL BP (BLOOD PRESSURE) < 140/90: ICD-10-CM

## 2024-05-02 DIAGNOSIS — Z01.818 PREOP GENERAL PHYSICAL EXAM: Primary | ICD-10-CM

## 2024-05-02 PROCEDURE — 99214 OFFICE O/P EST MOD 30 MIN: CPT | Performed by: FAMILY MEDICINE

## 2024-05-02 RX ORDER — PREDNISOLONE ACETATE 10 MG/ML
SUSPENSION/ DROPS OPHTHALMIC
COMMUNITY
Start: 2024-04-19

## 2024-05-02 ASSESSMENT — PAIN SCALES - GENERAL: PAINLEVEL: NO PAIN (0)

## 2024-05-02 NOTE — PATIENT INSTRUCTIONS
Stop the atorvastatin while you take the terbinafine    Do NOT take losartan the morning of surgery

## 2024-05-02 NOTE — PROGRESS NOTES
Preoperative Evaluation  [address]  [facility] [address]  Phone: [phone]  Primary Provider: Jigna Gil  Pre-op Performing Provider: JIGNA GIL  May 2, 2024       Christiana is a 68 year old, presenting for the following:  Pre-Op Exam        5/2/2024    10:39 AM   Additional Questions   Roomed by chauncey batres     Surgical Information  Surgery/Procedure: Retina  Surgery Location: [facility]  Surgeon: Dr. Oropeza  Surgery Date: 5/8/24  Time of Surgery: 730  Where patient plans to recover: At home with family      Assessment & Plan     The proposed surgical procedure is considered LOW risk.    Preop general physical exam    Retinal tear of left eye    Hypertension goal BP (blood pressure) < 140/90  Well controlled       Antiplatelet or Anticoagulation Medication Instructions   - Patient is on no antiplatelet or anticoagulation medications.    Additional Medication Instructions   - ACE/ARB: HOLD on day of surgery (minimum 11 hours for general anesthesia).    Recommendation  APPROVAL GIVEN to proceed with proposed procedure, without further diagnostic evaluation.      Subjective       HPI related to upcoming procedure: repairing hole in her retina        5/1/2024     8:27 AM   Preop Questions   1. Have you ever had a heart attack or stroke? No   2. Have you ever had surgery on your heart or blood vessels, such as a stent placement, a coronary artery bypass, or surgery on an artery in your head, neck, heart, or legs? No   3. Do you have chest pain with activity? No   4. Do you have a history of  heart failure? No   5. Do you currently have a cold, bronchitis or symptoms of other infection? No   6. Do you have a cough, shortness of breath, or wheezing? No   7. Do you or anyone in your family have previous history of blood clots? YES - father   8. Do you or does anyone in your family have a serious bleeding problem such as  "prolonged bleeding following surgeries or cuts? No   9. Have you ever had problems with anemia or been told to take iron pills? No   10. Have you had any abnormal blood loss such as black, tarry or bloody stools, or abnormal vaginal bleeding? No   11. Have you ever had a blood transfusion? No   12. Are you willing to have a blood transfusion if it is medically needed before, during, or after your surgery? Yes   13. Have you or any of your relatives ever had problems with anesthesia? No   14. Do you have sleep apnea, excessive snoring or daytime drowsiness? No   15. Do you have any artifical heart valves or other implanted medical devices like a pacemaker, defibrillator, or continuous glucose monitor? No   16. Do you have artificial joints? YES - left shoulder   17. Are you allergic to latex? No       Health Care Directive  Patient does not have a Health Care Directive or Living Will: Discussed advance care planning with patient; however, patient declined at this time.    Preoperative Review of    reviewed - no record of controlled substances prescribed.      Patient Active Problem List    Diagnosis Date Noted    History of cervical cancer 06/26/2013     Priority: Medium     1990 Hysterectomy due to cervical cancer. She was told it went deeper than they expected but they got it all.  10/27/09 NIL pap. Exam notes report, \"Cervix is without inflammation or abnormal discharge\". Continue cervical cancer screening.   8/25/11 NIL pap/neg HR HPV  6/26/13 NIL pap  11/8/16 NIL pap/neg HR HPV.         Hypertension goal BP (blood pressure) < 140/90 12/12/2010     Priority: Medium      Past Medical History:   Diagnosis Date    Arthritis     ostio    Cervix cancer (H) 01/01/1990    Depression     after 's death    History of left breast biopsy 01/01/2011    negative    HTN     Nonsenile cataract     UTI     history of      Past Surgical History:   Procedure Laterality Date    ARTHROPLASTY SHOULDER Left 02/12/2020    " Procedure: Left shoulder hemiarthroplasy with bicep tenodesis;  Surgeon: Nura Cole MD;  Location: [facility] OR    ARTHROSCOPY SHOULDER Left 02/12/2020    Procedure: left shoulder arthroscopy;  Surgeon: Nura Cole MD;  Location: [facility] OR    BACK SURGERY  01/01/2009    BIOPSY BREAST  01/01/2011    benign    BLEPHAROPLASTY Bilateral 04/18/2022    Procedure: BLEPHAROPLASTY;  Surgeon: Lico Ma MD;  Location: [facility] OR    C/SECTION, CLASSICAL      x2    COLONOSCOPY  01/01/2005    COLONOSCOPY N/A 02/12/2018    Procedure: COLONOSCOPY;  COLONOSCOPY;  Surgeon: Ken Fair MD;  Location:  GI    GENITOURINARY SURGERY  2022    Bladder sling    HEAD & NECK SURGERY  01/01/2011    HERNIA REPAIR  01/01/2003    HYSTERECTOMY  01/01/1990    one ovary removed, h/o cervical cancer    REPAIR PTOSIS      SLING TRANSVAGINAL N/A 05/15/2023    Procedure: Pubovaginal sling with Altis;  Surgeon: Ken Murray MD;  Location: [facility] OR    ZZC APPENDECTOMY      ZZC ENLARGE BREAST  01/01/2006    ZZC NONSPECIFIC PROCEDURE  02/01/2004    ventral hernia repair    ZZC NONSPECIFIC PROCEDURE  12/29/2011    Lumbar fusion L5-S1    ZZC NONSPECIFIC PROCEDURE      cervical fusion C3     Current Outpatient Medications   Medication Sig Dispense Refill    atenolol-chlorthalidone (TENORETIC) 50-25 MG tablet Take 1 tablet by mouth daily 90 tablet 3    atorvastatin (LIPITOR) 20 MG tablet Take 1 tablet (20 mg) by mouth daily 90 tablet 3    chlorhexidine (PERIDEX) 0.12 % solution Take 15 mLs by mouth twice a week      estradiol (ESTRACE) 0.5 MG tablet Take 1 tablet (0.5 mg) by mouth daily 90 tablet 3    losartan (COZAAR) 25 MG tablet Take 1 tablet (25 mg) by mouth 2 times daily 180 tablet 3    MAGNESIUM PO       meloxicam (MOBIC) 15 MG tablet TAKE 1 TABLET DAILY AS NEEDED 30 tablet 0    Multiple Vitamin (MULTIVITAMIN OR) Take 1 tablet by mouth daily.      potassium chloride ER (K-TAB) 20 MEQ CR tablet TAKE 1 TABLET THREE TIMES A DAY  "270 tablet 3    prednisoLONE acetate (PRED FORTE) 1 % ophthalmic suspension       [employer] JOH[employer] WORT PO Take by mouth 2 times daily       terbinafine (LAMISIL) 250 MG tablet Take 1 tablet (250 mg) by mouth daily for 90 days 90 tablet 0       No Known Allergies     Social History     Tobacco Use    Smoking status: Former     Current packs/day: 0.00     Average packs/day: 1 pack/day for 2.0 years (2.0 ttl pk-yrs)     Types: Cigarettes     Start date: 2008     Quit date: 2010     Years since quittin.7     Passive exposure: Never    Smokeless tobacco: Never    Tobacco comments:     no use of tobacco products   Substance Use Topics    Alcohol use: Yes     Comment: Socially       History   Drug Use Unknown         Review of Systems    Review of Systems  CONSTITUTIONAL: NEGATIVE for fever, chills, change in weight  INTEGUMENTARY/SKIN: NEGATIVE for worrisome rashes, moles or lesions  EYES: NEGATIVE for vision changes or irritation  ENT/MOUTH: NEGATIVE for ear, mouth and throat problems  RESP: NEGATIVE for significant cough or SOB  CV: NEGATIVE for chest pain, palpitations or peripheral edema  GI: NEGATIVE for nausea, abdominal pain, heartburn, or change in bowel habits  : NEGATIVE for frequency, dysuria, or hematuria  MUSCULOSKELETAL: NEGATIVE for significant arthralgias or myalgia  NEURO: NEGATIVE for weakness, dizziness or paresthesias  ENDOCRINE: NEGATIVE for temperature intolerance, skin/hair changes  HEME: NEGATIVE for bleeding problems  PSYCHIATRIC: NEGATIVE for changes in mood or affect    Objective    /60 (BP Location: Left arm, Patient Position: Sitting, Cuff Size: Adult Regular)   Pulse 55   Temp 98.2  F (36.8  C) (Temporal)   Resp 16   Ht 1.575 m (5' 2.01\")   Wt 63.5 kg (140 lb)   SpO2 98%   BMI 25.60 kg/m     Estimated body mass index is 25.6 kg/m  as calculated from the following:    Height as of this encounter: 1.575 m (5' 2.01\").    Weight as of this encounter: 63.5 kg (140 " lb).  Physical Exam  GENERAL: alert and no distress  EYES: Eyes grossly normal to inspection, PERRL and conjunctivae and sclerae normal  HENT: ear canals and TM's normal, nose and mouth without ulcers or lesions  NECK: no adenopathy, no asymmetry, masses, or scars  RESP: lungs clear to auscultation - no rales, rhonchi or wheezes  CV: regular rate and rhythm, normal S1 S2, no S3 or S4, no murmur, click or rub, no peripheral edema  ABDOMEN: soft, nontender, no hepatosplenomegaly, no masses and bowel sounds normal  MS: no gross musculoskeletal defects noted, no edema  SKIN: no suspicious lesions or rashes  NEURO: Normal strength and tone, mentation intact and speech normal  PSYCH: mentation appears normal, affect normal/bright    Recent Labs   Lab Test 04/16/24  1400 01/11/24  1106   HGB  --  12.6   PLT  --  362    139   POTASSIUM 4.5 4.6   CR 0.90 0.94        Diagnostics  No labs were ordered during this visit.   No EKG required, no history of coronary heart disease, significant arrhythmia, peripheral arterial disease or other structural heart disease.    Revised Cardiac Risk Index (RCRI)  The patient has the following serious cardiovascular risks for perioperative complications:   - No serious cardiac risks = 0 points     RCRI Interpretation: 0 points: Class I (very low risk - 0.4% complication rate)         Signed Electronically by: Jigna Lees MD  Copy of this evaluation report is provided to requesting physician.

## 2024-05-02 NOTE — TELEPHONE ENCOUNTER
These forms are just for the preop and the preop office notes includes everything they need. Faxed over preop notes to [phone] and [phone]. I did clarify with Verónica @ [facility]

## 2024-05-10 ENCOUNTER — MYC MEDICAL ADVICE (OUTPATIENT)
Dept: FAMILY MEDICINE | Facility: OTHER | Age: 69
End: 2024-05-10
Payer: COMMERCIAL

## 2024-05-10 ENCOUNTER — MYC REFILL (OUTPATIENT)
Dept: FAMILY MEDICINE | Facility: OTHER | Age: 69
End: 2024-05-10
Payer: COMMERCIAL

## 2024-05-10 DIAGNOSIS — B35.1 ONYCHOMYCOSIS: ICD-10-CM

## 2024-05-10 DIAGNOSIS — I10 HYPERTENSION GOAL BP (BLOOD PRESSURE) < 140/90: ICD-10-CM

## 2024-05-10 RX ORDER — TERBINAFINE HYDROCHLORIDE 250 MG/1
250 TABLET ORAL DAILY
Qty: 90 TABLET | Refills: 0 | Status: SHIPPED | OUTPATIENT
Start: 2024-05-10 | End: 2024-06-18

## 2024-05-10 RX ORDER — TERBINAFINE HYDROCHLORIDE 250 MG/1
250 TABLET ORAL DAILY
Qty: 90 TABLET | Refills: 0 | OUTPATIENT
Start: 2024-05-10

## 2024-05-10 RX ORDER — LOSARTAN POTASSIUM 25 MG/1
25 TABLET ORAL 2 TIMES DAILY
Qty: 180 TABLET | Refills: 3 | OUTPATIENT
Start: 2024-05-10

## 2024-05-10 RX ORDER — LOSARTAN POTASSIUM 25 MG/1
25 TABLET ORAL 2 TIMES DAILY
Qty: 180 TABLET | Refills: 0 | Status: SHIPPED | OUTPATIENT
Start: 2024-05-10 | End: 2024-06-18

## 2024-05-10 NOTE — TELEPHONE ENCOUNTER
Medications have been approved [facility] as requested by patient  Carola Astorga MA on 5/10/2024 at 1:26 PM

## 2024-05-10 NOTE — TELEPHONE ENCOUNTER
E-Cancel Status: Request approved by pharmacy (5/10/2024  9:20 AM CDT)     Pharmacy has received cancel notice. Closing encounter.     Leana Holbrook, KIKIN, RN

## 2024-05-10 NOTE — TELEPHONE ENCOUNTER
Please direct patient to check with her pharmacist as both the losartan and terbinafine were sent out by her PCP for 90 day supply within the past 3 weeks.    Ash Corral PA-C on 5/10/2024 at 9:11 AM

## 2024-05-10 NOTE — TELEPHONE ENCOUNTER
Please cancel losartan and terbinafine Rx sent to [facility] by PCP this past April.    Thanks,  Ash Corral PA-C on 5/10/2024 at 9:20 AM

## 2024-06-18 ENCOUNTER — MYC MEDICAL ADVICE (OUTPATIENT)
Dept: FAMILY MEDICINE | Facility: OTHER | Age: 69
End: 2024-06-18
Payer: COMMERCIAL

## 2024-06-18 DIAGNOSIS — B35.1 ONYCHOMYCOSIS: ICD-10-CM

## 2024-06-18 DIAGNOSIS — I10 HYPERTENSION GOAL BP (BLOOD PRESSURE) < 140/90: ICD-10-CM

## 2024-06-18 DIAGNOSIS — M19.012 PRIMARY OSTEOARTHRITIS OF LEFT SHOULDER: ICD-10-CM

## 2024-06-18 DIAGNOSIS — E78.2 MIXED HYPERLIPIDEMIA: ICD-10-CM

## 2024-06-18 DIAGNOSIS — N95.1 MENOPAUSAL SYNDROME (HOT FLASHES): ICD-10-CM

## 2024-06-18 RX ORDER — ESTRADIOL 0.5 MG/1
0.5 TABLET ORAL DAILY
Qty: 90 TABLET | Refills: 3 | Status: SHIPPED | OUTPATIENT
Start: 2024-06-18

## 2024-06-18 RX ORDER — TERBINAFINE HYDROCHLORIDE 250 MG/1
250 TABLET ORAL DAILY
Qty: 90 TABLET | Refills: 0 | Status: CANCELLED | OUTPATIENT
Start: 2024-06-18

## 2024-06-18 RX ORDER — TERBINAFINE HYDROCHLORIDE 250 MG/1
250 TABLET ORAL DAILY
Qty: 90 TABLET | Refills: 0 | Status: SHIPPED | OUTPATIENT
Start: 2024-06-18

## 2024-06-18 RX ORDER — POTASSIUM CHLORIDE 1500 MG/1
TABLET, EXTENDED RELEASE ORAL
Qty: 270 TABLET | Refills: 2 | Status: SHIPPED | OUTPATIENT
Start: 2024-06-18

## 2024-06-18 RX ORDER — MELOXICAM 15 MG/1
TABLET ORAL
Qty: 90 TABLET | Refills: 2 | Status: SHIPPED | OUTPATIENT
Start: 2024-06-18

## 2024-06-18 RX ORDER — ATENOLOL AND CHLORTHALIDONE TABLET 50; 25 MG/1; MG/1
1 TABLET ORAL DAILY
Qty: 90 TABLET | Refills: 2 | Status: SHIPPED | OUTPATIENT
Start: 2024-06-18

## 2024-06-18 RX ORDER — LOSARTAN POTASSIUM 25 MG/1
25 TABLET ORAL 2 TIMES DAILY
Qty: 180 TABLET | Refills: 2 | Status: SHIPPED | OUTPATIENT
Start: 2024-06-18

## 2024-06-18 RX ORDER — ATORVASTATIN CALCIUM 20 MG/1
20 TABLET, FILM COATED ORAL DAILY
Qty: 90 TABLET | Refills: 2 | Status: SHIPPED | OUTPATIENT
Start: 2024-06-18

## 2024-08-01 ENCOUNTER — MYC MEDICAL ADVICE (OUTPATIENT)
Dept: FAMILY MEDICINE | Facility: OTHER | Age: 69
End: 2024-08-01
Payer: COMMERCIAL

## 2024-10-09 ENCOUNTER — MYC REFILL (OUTPATIENT)
Dept: FAMILY MEDICINE | Facility: OTHER | Age: 69
End: 2024-10-09
Payer: COMMERCIAL

## 2024-10-09 DIAGNOSIS — B35.1 ONYCHOMYCOSIS: ICD-10-CM

## 2024-10-09 RX ORDER — TERBINAFINE HYDROCHLORIDE 250 MG/1
250 TABLET ORAL DAILY
Qty: 90 TABLET | Refills: 0 | Status: CANCELLED | OUTPATIENT
Start: 2024-10-09

## 2024-10-11 ENCOUNTER — TRANSFERRED RECORDS (OUTPATIENT)
Dept: HEALTH INFORMATION MANAGEMENT | Facility: CLINIC | Age: 69
End: 2024-10-11
Payer: COMMERCIAL

## 2024-11-19 ENCOUNTER — OFFICE VISIT (OUTPATIENT)
Dept: FAMILY MEDICINE | Facility: OTHER | Age: 69
End: 2024-11-19
Payer: COMMERCIAL

## 2024-11-19 ENCOUNTER — ORDERS ONLY (AUTO-RELEASED) (OUTPATIENT)
Dept: FAMILY MEDICINE | Facility: OTHER | Age: 69
End: 2024-11-19

## 2024-11-19 VITALS
HEIGHT: 61 IN | OXYGEN SATURATION: 98 % | SYSTOLIC BLOOD PRESSURE: 130 MMHG | RESPIRATION RATE: 16 BRPM | TEMPERATURE: 97.5 F | WEIGHT: 142.5 LBS | HEART RATE: 54 BPM | BODY MASS INDEX: 26.91 KG/M2 | DIASTOLIC BLOOD PRESSURE: 80 MMHG

## 2024-11-19 DIAGNOSIS — M54.42 CHRONIC BILATERAL LOW BACK PAIN WITH BILATERAL SCIATICA: ICD-10-CM

## 2024-11-19 DIAGNOSIS — M54.41 CHRONIC BILATERAL LOW BACK PAIN WITH BILATERAL SCIATICA: ICD-10-CM

## 2024-11-19 DIAGNOSIS — R00.2 PALPITATIONS: ICD-10-CM

## 2024-11-19 DIAGNOSIS — G89.29 CHRONIC BILATERAL LOW BACK PAIN WITH BILATERAL SCIATICA: ICD-10-CM

## 2024-11-19 DIAGNOSIS — E78.2 MIXED HYPERLIPIDEMIA: ICD-10-CM

## 2024-11-19 DIAGNOSIS — R00.2 PALPITATIONS: Primary | ICD-10-CM

## 2024-11-19 LAB
ANION GAP SERPL CALCULATED.3IONS-SCNC: 10 MMOL/L (ref 7–15)
BUN SERPL-MCNC: 21.9 MG/DL (ref 8–23)
CALCIUM SERPL-MCNC: 9.5 MG/DL (ref 8.8–10.4)
CHLORIDE SERPL-SCNC: 104 MMOL/L (ref 98–107)
CHOLEST SERPL-MCNC: 196 MG/DL
CK SERPL-CCNC: 149 U/L (ref 26–192)
CREAT SERPL-MCNC: 1.07 MG/DL (ref 0.51–0.95)
EGFRCR SERPLBLD CKD-EPI 2021: 56 ML/MIN/1.73M2
ERYTHROCYTE [DISTWIDTH] IN BLOOD BY AUTOMATED COUNT: 12.1 % (ref 10–15)
FASTING STATUS PATIENT QL REPORTED: NO
FASTING STATUS PATIENT QL REPORTED: NO
GLUCOSE SERPL-MCNC: 88 MG/DL (ref 70–99)
HCO3 SERPL-SCNC: 26 MMOL/L (ref 22–29)
HCT VFR BLD AUTO: 36.5 % (ref 35–47)
HDLC SERPL-MCNC: 87 MG/DL
HGB BLD-MCNC: 12.1 G/DL (ref 11.7–15.7)
LDLC SERPL CALC-MCNC: 89 MG/DL
MAGNESIUM SERPL-MCNC: 2.1 MG/DL (ref 1.7–2.3)
MCH RBC QN AUTO: 32.2 PG (ref 26.5–33)
MCHC RBC AUTO-ENTMCNC: 33.2 G/DL (ref 31.5–36.5)
MCV RBC AUTO: 97 FL (ref 78–100)
NONHDLC SERPL-MCNC: 109 MG/DL
PLATELET # BLD AUTO: 259 10E3/UL (ref 150–450)
POTASSIUM SERPL-SCNC: 3.9 MMOL/L (ref 3.4–5.3)
RBC # BLD AUTO: 3.76 10E6/UL (ref 3.8–5.2)
SODIUM SERPL-SCNC: 140 MMOL/L (ref 135–145)
TRIGL SERPL-MCNC: 101 MG/DL
TSH SERPL DL<=0.005 MIU/L-ACNC: 2.1 UIU/ML (ref 0.3–4.2)
WBC # BLD AUTO: 6 10E3/UL (ref 4–11)

## 2024-11-19 PROCEDURE — 85027 COMPLETE CBC AUTOMATED: CPT | Performed by: FAMILY MEDICINE

## 2024-11-19 PROCEDURE — 90480 ADMN SARSCOV2 VAC 1/ONLY CMP: CPT | Performed by: FAMILY MEDICINE

## 2024-11-19 PROCEDURE — 90662 IIV NO PRSV INCREASED AG IM: CPT | Performed by: FAMILY MEDICINE

## 2024-11-19 PROCEDURE — 91320 SARSCV2 VAC 30MCG TRS-SUC IM: CPT | Performed by: FAMILY MEDICINE

## 2024-11-19 PROCEDURE — 80061 LIPID PANEL: CPT | Performed by: FAMILY MEDICINE

## 2024-11-19 PROCEDURE — 80048 BASIC METABOLIC PNL TOTAL CA: CPT | Performed by: FAMILY MEDICINE

## 2024-11-19 PROCEDURE — 83735 ASSAY OF MAGNESIUM: CPT | Performed by: FAMILY MEDICINE

## 2024-11-19 PROCEDURE — 82550 ASSAY OF CK (CPK): CPT | Performed by: FAMILY MEDICINE

## 2024-11-19 PROCEDURE — 84443 ASSAY THYROID STIM HORMONE: CPT | Performed by: FAMILY MEDICINE

## 2024-11-19 PROCEDURE — 36415 COLL VENOUS BLD VENIPUNCTURE: CPT | Performed by: FAMILY MEDICINE

## 2024-11-19 PROCEDURE — G0008 ADMIN INFLUENZA VIRUS VAC: HCPCS | Performed by: FAMILY MEDICINE

## 2024-11-19 PROCEDURE — 99214 OFFICE O/P EST MOD 30 MIN: CPT | Performed by: FAMILY MEDICINE

## 2024-11-19 PROCEDURE — G2211 COMPLEX E/M VISIT ADD ON: HCPCS | Performed by: FAMILY MEDICINE

## 2024-11-19 ASSESSMENT — PAIN SCALES - GENERAL: PAINLEVEL_OUTOF10: NO PAIN (0)

## 2024-11-19 NOTE — PROGRESS NOTES
Assessment & Plan     Palpitations  She shows me several tracings from her watch that does reveal an irregularly irregular rhythm.  At this time I recommend that we do labs looking for electrolyte dysfunction or anemia and will obtain a Zio patch monitor.  She currently is on atenolol for blood pressure and this can help out with rate control.  We discussed that paroxysmal atrial fibrillation can cause an increased risk in stroke but at this time we will try to capture if she is having this before committing her to anticoagulation.      - TSH with free T4 reflex; Future  - CBC with platelets; Future  - Basic metabolic panel  (Ca, Cl, CO2, Creat, Gluc, K, Na, BUN); Future  - Magnesium; Future  - ZIO PATCH MAIL OUT; Future    Chronic bilateral low back pain with bilateral sciatica  I suspect she is having progression of her spinal stenosis which was noticed on MRI in 2011.  However her neurologic exam was normal so I believe at this time we will obtain a lumbar x-ray and refer to physical therapy.  If this is not improving over the next 6 weeks would then consider lumbar MRI.    - XR Lumbar Spine 2/3 Views; Future  - Physical Therapy  Referral; Future    Mixed hyperlipidemia  We will recheck her lipids and CK since she has started atorvastatin.    - CK total; Future  - Lipid panel reflex to direct LDL Non-fasting; Future    Subjective   Christiana is a 68 year old, presenting for the following health issues:  Afib       11/19/2024     1:01 PM   Additional Questions   Roomed by norma   Accompanied by self     History of Present Illness       Reason for visit:  A/fib  Symptom onset:  1-2 weeks ago  Symptoms include:  Odd heart rythem  Symptom intensity:  Mild  Symptom progression:  Improving  Had these symptoms before:  No  What makes it worse:  No  What makes it better:  No   She is taking medications regularly.     On November 4th, she felt exhausted.  Denies palpitations, shortness of breath.  Just didn't feel  "right.  Noticed that her watch was flashing and it told her she had atrial fibrillation and to see her doctor.  She felt she slept well that day.  The next day she took her EKG on her watch and it still said atrial fibrillation and then later that day it said it was normal.  At no time did she feel the palpitations but she states that she just felt crummy and fatigued.    Son and mother have atrial fibrillation.     States has had back surgery in the past in 2009.  States that she walks bent over.  Trying to walk straight up, but then gets a burn in her back that radiates down the back of both legs, worse on the right.  Doesn't go into the feet.  No numbness.  Difficult coming up from a squat, feels weak.  Feels that she is catching/tripping on doorways or carpet/rug changes. Last MRI 2011 which revealed L4-L5 degeneration and mild to moderate spinal canal stenosis.      Objective    Pulse 54   Temp 97.5  F (36.4  C) (Temporal)   Resp 16   Ht 1.55 m (5' 1.02\")   Wt 64.6 kg (142 lb 8 oz)   SpO2 98%   BMI 26.90 kg/m    Body mass index is 26.9 kg/m .  Physical Exam   Gen: no apparent distress  NECK: no adenopathy, no asymmetry, no masses  Chest: clear to auscultation without wheeze, rale or rhonchi  Cor: regular rate and rhythm without murmur  Ext: warm and dry without edema  Psych: Alert and oriented times 3; coherent speech, normal   rate and volume, able to articulate logical thoughts, able   to abstract reason, no tangential thoughts, no hallucinations   or delusions  Her affect is neutral  Comprehensive back pain exam:  No tenderness, Range of motion not limited by pain, Lower extremity strength functional and equal on both sides, Lower extremity reflexes within normal limits bilaterally, Lower extremity sensation normal and equal on both sides, and Straight leg raise negative bilaterally           Signed Electronically by: Jigna Lees MD    "

## 2024-12-10 LAB — CV ZIO PRELIM RESULTS: NORMAL

## 2024-12-14 LAB — CV ZIO PRELIM RESULTS: NORMAL

## 2025-02-18 ENCOUNTER — OFFICE VISIT (OUTPATIENT)
Dept: FAMILY MEDICINE | Facility: OTHER | Age: 70
End: 2025-02-18
Payer: COMMERCIAL

## 2025-02-18 VITALS
TEMPERATURE: 97.1 F | WEIGHT: 147 LBS | SYSTOLIC BLOOD PRESSURE: 130 MMHG | RESPIRATION RATE: 16 BRPM | DIASTOLIC BLOOD PRESSURE: 60 MMHG | HEART RATE: 54 BPM | OXYGEN SATURATION: 96 % | BODY MASS INDEX: 27.75 KG/M2

## 2025-02-18 DIAGNOSIS — I10 HYPERTENSION GOAL BP (BLOOD PRESSURE) < 140/90: ICD-10-CM

## 2025-02-18 DIAGNOSIS — I47.10 PAROXYSMAL SUPRAVENTRICULAR TACHYCARDIA: Primary | ICD-10-CM

## 2025-02-18 PROCEDURE — 99213 OFFICE O/P EST LOW 20 MIN: CPT | Performed by: FAMILY MEDICINE

## 2025-02-18 NOTE — PROGRESS NOTES
Assessment & Plan     Paroxysmal supraventricular tachycardia  Discussed that paroxysmal supraventricular tachycardia is different from atrial fibrillation.  Discussed that she does not need to be on anticoagulation.  She is asymptomatic and remains active.  Discussed that if she is having more of these episodes which are symptomatic and lasting longer could then refer her to cardiology.      Hypertension goal BP (blood pressure) < 140/90  Well-controlled.  Discussed that the atenolol most likely is helping keep her heart rate controlled.      Subjective   Christiana is a 69 year old, presenting for the following health issues:  Follow Up (From Zio patch)      2/18/2025     2:01 PM   Additional Questions   Roomed by octavio     History of Present Illness       She eats 2-3 servings of fruits and vegetables daily.She consumes 0 sweetened beverage(s) daily.She exercises with enough effort to increase her heart rate 60 or more minutes per day.  She exercises with enough effort to increase her heart rate 6 days per week.   She is taking medications regularly.     Indication: Palpitations  7 day Ziopatch monitor  Baseline sinus rhythm with heart rates ranging , average 60 bpm.  17 runs of SVT, longest lasting 18 seconds  Patient triggered 1 event which correlated with normal sinus rhythm     3 months ago patient was seen as her fitness watch indicated a concern for possible atrial fibrillation.  Patient denied having palpitations or shortness of breath but states she just did not feel right that day.  Zio patch was ordered which found a few runs of SVT the longest lasting 18 seconds.  She states since then she has not had any other notifications from her watch.  She continues to deny palpitations, shortness of breath or lightheadedness.  She remains very active and exercises almost daily.  She did not follow-up with cardiology as she states she was too busy.      Objective    /60   Pulse 54   Temp 97.1  F  (36.2  C) (Temporal)   Resp 16   Wt 66.7 kg (147 lb)   SpO2 96%   BMI 27.75 kg/m    Body mass index is 27.75 kg/m .  Physical Exam   Gen: no apparent distress  Chest/CV: S1 and S2 normal, no murmurs, clicks, gallops or rubs. Regular rate and rhythm. Chest is clear; no wheezes or rales. No edema           Signed Electronically by: Jigna Lees MD

## 2025-03-08 ENCOUNTER — MYC MEDICAL ADVICE (OUTPATIENT)
Dept: FAMILY MEDICINE | Facility: OTHER | Age: 70
End: 2025-03-08
Payer: COMMERCIAL

## 2025-03-17 ENCOUNTER — PATIENT OUTREACH (OUTPATIENT)
Dept: CARE COORDINATION | Facility: CLINIC | Age: 70
End: 2025-03-17
Payer: COMMERCIAL

## 2025-03-20 ENCOUNTER — MYC MEDICAL ADVICE (OUTPATIENT)
Dept: FAMILY MEDICINE | Facility: OTHER | Age: 70
End: 2025-03-20
Payer: COMMERCIAL

## 2025-03-20 NOTE — TELEPHONE ENCOUNTER
Called and spoke with patient.     She states she was sending the message to PCP to track these episodes. Yesterday she was feeling fatigued but this is resolved today. She denies all other symptoms.   States she feels the same as she did previously with other episodes.     RN did note a referral to cardiology was placed, but patient says per discussion with provider during office visit 2/18/25 to hold off on seeing cardiology unless episodes persist.     Will leave for PCP to review upon return to clinic.     Advised patient if these episodes occur more frequently or she has further symptoms to call and speak with triage.     Niurka SWANSONN, RN

## 2025-03-20 NOTE — TELEPHONE ENCOUNTER
Would recommend that if she has any sustained symptoms or worsening symptoms she needs to be seen immediately.      Stefany Anderson PA-C

## 2025-03-24 ENCOUNTER — MYC REFILL (OUTPATIENT)
Dept: FAMILY MEDICINE | Facility: OTHER | Age: 70
End: 2025-03-24
Payer: COMMERCIAL

## 2025-03-24 DIAGNOSIS — M19.012 PRIMARY OSTEOARTHRITIS OF LEFT SHOULDER: ICD-10-CM

## 2025-03-24 RX ORDER — MELOXICAM 15 MG/1
TABLET ORAL
Qty: 90 TABLET | Refills: 0 | Status: SHIPPED | OUTPATIENT
Start: 2025-03-24

## 2025-04-09 ENCOUNTER — PATIENT OUTREACH (OUTPATIENT)
Dept: CARE COORDINATION | Facility: CLINIC | Age: 70
End: 2025-04-09
Payer: COMMERCIAL

## 2025-04-12 SDOH — HEALTH STABILITY: PHYSICAL HEALTH: ON AVERAGE, HOW MANY DAYS PER WEEK DO YOU ENGAGE IN MODERATE TO STRENUOUS EXERCISE (LIKE A BRISK WALK)?: 3 DAYS

## 2025-04-12 SDOH — HEALTH STABILITY: PHYSICAL HEALTH: ON AVERAGE, HOW MANY MINUTES DO YOU ENGAGE IN EXERCISE AT THIS LEVEL?: 60 MIN

## 2025-04-12 ASSESSMENT — SOCIAL DETERMINANTS OF HEALTH (SDOH): HOW OFTEN DO YOU GET TOGETHER WITH FRIENDS OR RELATIVES?: TWICE A WEEK

## 2025-04-17 ENCOUNTER — OFFICE VISIT (OUTPATIENT)
Dept: FAMILY MEDICINE | Facility: OTHER | Age: 70
End: 2025-04-17
Payer: COMMERCIAL

## 2025-04-17 VITALS
HEIGHT: 61 IN | SYSTOLIC BLOOD PRESSURE: 110 MMHG | BODY MASS INDEX: 26.81 KG/M2 | RESPIRATION RATE: 16 BRPM | TEMPERATURE: 97.5 F | HEART RATE: 59 BPM | WEIGHT: 142 LBS | DIASTOLIC BLOOD PRESSURE: 62 MMHG | OXYGEN SATURATION: 98 %

## 2025-04-17 DIAGNOSIS — Z12.31 VISIT FOR SCREENING MAMMOGRAM: ICD-10-CM

## 2025-04-17 DIAGNOSIS — M19.012 PRIMARY OSTEOARTHRITIS OF LEFT SHOULDER: ICD-10-CM

## 2025-04-17 DIAGNOSIS — I47.10 PAROXYSMAL SUPRAVENTRICULAR TACHYCARDIA: ICD-10-CM

## 2025-04-17 DIAGNOSIS — Z00.00 ENCOUNTER FOR MEDICARE ANNUAL WELLNESS EXAM: Primary | ICD-10-CM

## 2025-04-17 DIAGNOSIS — I10 HYPERTENSION GOAL BP (BLOOD PRESSURE) < 140/90: ICD-10-CM

## 2025-04-17 RX ORDER — LOSARTAN POTASSIUM 25 MG/1
25 TABLET ORAL 2 TIMES DAILY
Qty: 180 TABLET | Refills: 3 | Status: SHIPPED | OUTPATIENT
Start: 2025-04-17

## 2025-04-17 RX ORDER — MELOXICAM 15 MG/1
TABLET ORAL
Qty: 90 TABLET | Refills: 3 | Status: SHIPPED | OUTPATIENT
Start: 2025-04-17

## 2025-04-17 RX ORDER — ATENOLOL AND CHLORTHALIDONE TABLET 50; 25 MG/1; MG/1
1 TABLET ORAL DAILY
Qty: 90 TABLET | Refills: 3 | Status: SHIPPED | OUTPATIENT
Start: 2025-04-17

## 2025-04-17 RX ORDER — POTASSIUM CHLORIDE 1500 MG/1
TABLET, EXTENDED RELEASE ORAL
Qty: 270 TABLET | Refills: 3 | Status: SHIPPED | OUTPATIENT
Start: 2025-04-17

## 2025-04-17 NOTE — PROGRESS NOTES
"Preventive Care Visit  [facility]  Jigna Lees MD, Family Medicine  Apr 17, 2025      Assessment & Plan     Encounter for Medicare annual wellness exam    Hypertension goal BP (blood pressure) < 140/90  Well controlled, refills given    Paroxysmal supraventricular tachycardia  Noticing more episodes, continues on atenolol, will referral to Cardiology     Primary osteoarthritis of left shoulder  Feels Meloxicam is helpful, refills given    Visit for screening mammogram  Due for mammogram      BMI  Estimated body mass index is 26.83 kg/m  as calculated from the following:    Height as of this encounter: 1.549 m (5' 1\").    Weight as of this encounter: 64.4 kg (142 lb).       Counseling  Appropriate preventive services were addressed with this patient via screening, questionnaire, or discussion as appropriate for fall prevention, nutrition, physical activity, Tobacco-use cessation, social engagement, weight loss and cognition.  Checklist reviewing preventive services available has been given to the patient.  Reviewed patient's diet, addressing concerns and/or questions.   She is at risk for lack of exercise and has been provided with information to increase physical activity for the benefit of her well-being.   The patient was provided with written information regarding signs of hearing loss.   Information on urinary incontinence and treatment options given to patient.           Maria T Villeda is a 69 year old, presenting for the following:  Medicare Visit        4/17/2025     1:45 PM   Additional Questions   Roomed by octavio KRISHNA       Advance Care Planning    Discussed advance care planning with patient; informed AVS has link to [employer].        4/12/2025   General Health   How would you rate your overall physical health? (!) FAIR   Feel stress (tense, anxious, or unable to sleep) Only a little   (!) STRESS CONCERN      4/12/2025   Nutrition   Diet: Regular " (no restrictions)         4/12/2025   Exercise   Days per week of moderate/strenous exercise 3 days   Average minutes spent exercising at this level 60 min         4/12/2025   Social Factors   Frequency of gathering with friends or relatives Twice a week   Worry food won't last until get money to buy more No   Food not last or not have enough money for food? No   Do you have housing? (Housing is defined as stable permanent housing and does not include staying ouside in a car, in a tent, in an abandoned building, in an overnight shelter, or couch-surfing.) Yes   Are you worried about losing your housing? No   Lack of transportation? No   Unable to get utilities (heat,electricity)? No         4/12/2025   Fall Risk   Fallen 2 or more times in the past year? No   Trouble with walking or balance? No          4/12/2025   Activities of Daily Living- Home Safety   Needs help with the following daily activites None of the above   Safety concerns in the home None of the above         4/12/2025   Dental   Dentist two times every year? Yes         4/12/2025   Hearing Screening   Hearing concerns? (!) I NEED TO ASK PEOPLE TO SPEAK UP OR REPEAT THEMSELVES.         4/12/2025   Driving Risk Screening   Patient/family members have concerns about driving No         4/12/2025   General Alertness/Fatigue Screening   Have you been more tired than usual lately? No         4/12/2025   Urinary Incontinence Screening   Bothered by leaking urine in past 6 months Yes         Today's PHQ-2 Score:       4/16/2025     6:41 PM   PHQ-2 ( 1999 [employer])   Q1: Little interest or pleasure in doing things 0   Q2: Feeling down, depressed or hopeless 0   PHQ-2 Score 0    Q1: Little interest or pleasure in doing things Not at all   Q2: Feeling down, depressed or hopeless Not at all   PHQ-2 Score 0       Patient-reported           4/12/2025   Substance Use   Alcohol more than 3/day or more than 7/wk No   Do you have a current opioid prescription? No   How  severe/bad is pain from 1 to 10? 3/10   Do you use any other substances recreationally? No    (!) DECLINE       Multiple values from one day are sorted in reverse-chronological order     Social History     Tobacco Use    Smoking status: Former     Current packs/day: 0.00     Average packs/day: 1 pack/day for 2.0 years (2.0 ttl pk-yrs)     Types: Cigarettes     Start date: 2008     Quit date: 2010     Years since quittin.7     Passive exposure: Never    Smokeless tobacco: Never    Tobacco comments:     no use of tobacco products   Vaping Use    Vaping status: Never Used   Substance Use Topics    Alcohol use: Yes     Comment: Socially    Drug use: Never           2023   LAST FHS-7 RESULTS   1st degree relative breast or ovarian cancer No   Any relative bilateral breast cancer No   Any male have breast cancer No   Any ONE woman have BOTH breast AND ovarian cancer No   Any woman with breast cancer before 50yrs No   2 or more relatives with breast AND/OR ovarian cancer No   2 or more relatives with breast AND/OR bowel cancer No        Mammogram Screening - Mammogram every 1-2 years updated in Health Maintenance based on mutual decision making    ASCVD Risk   The 10-year ASCVD risk score (Dana GIBBONS, et al., 2019) is: 9.8%    Values used to calculate the score:      Age: 69 years      Sex: Female      Is Non- : No      Diabetic: No      Tobacco smoker: No      Systolic Blood Pressure: 110 mmHg      Is BP treated: Yes      HDL Cholesterol: 87 mg/dL      Total Cholesterol: 196 mg/dL          Reviewed and updated as needed this visit by Provider   Tobacco  Allergies  Meds  Problems  Med Hx  Surg Hx  Fam Hx            Current providers sharing in care for this patient include:  Patient Care Team:  Jigna Lees MD as PCP - General (Family Practice)  Jigna Lees MD as Assigned PCP  Ken Murray MD as MD (Urology)    The following health  "maintenance items are reviewed in Epic and correct as of today:  Health Maintenance   Topic Date Due    RSV VACCINE (1 - Risk 60-74 years 1-dose series) Never done    MAMMO SCREENING  05/09/2025    COVID-19 Vaccine (8 - 2024-25 season) 05/19/2025    BMP  11/19/2025    LIPID  11/19/2025    ANNUAL REVIEW OF HM ORDERS  02/18/2026    MEDICARE ANNUAL WELLNESS VISIT  04/17/2026    FALL RISK ASSESSMENT  04/17/2026    DIABETES SCREENING  11/19/2027    COLORECTAL CANCER SCREENING  02/12/2028    ADVANCE CARE PLANNING  05/02/2029    DTAP/TDAP/TD IMMUNIZATION (3 - Td or Tdap) 08/20/2031    DEXA  03/13/2034    HEPATITIS C SCREENING  Completed    PHQ-2 (once per calendar year)  Completed    INFLUENZA VACCINE  Completed    Pneumococcal Vaccine: 50+ Years  Completed    ZOSTER IMMUNIZATION  Completed    HPV IMMUNIZATION  Aged Out    MENINGITIS IMMUNIZATION  Aged Out    LUNG CANCER SCREENING  Discontinued          Objective    Exam  /62 (BP Location: Right arm, Patient Position: Sitting, Cuff Size: Adult Regular)   Pulse 59   Temp 97.5  F (36.4  C) (Temporal)   Resp 16   Ht 1.549 m (5' 1\")   Wt 64.4 kg (142 lb)   SpO2 98%   BMI 26.83 kg/m     Estimated body mass index is 26.83 kg/m  as calculated from the following:    Height as of this encounter: 1.549 m (5' 1\").    Weight as of this encounter: 64.4 kg (142 lb).    Physical Exam  Constitutional:       General: She is not in acute distress.     Appearance: She is well-developed.   HENT:      Right Ear: Tympanic membrane and external ear normal.      Left Ear: Tympanic membrane and external ear normal.      Nose: Nose normal.   Eyes:      General:         Right eye: No discharge.         Left eye: No discharge.      Conjunctiva/sclera: Conjunctivae normal.   Neck:      Thyroid: No thyroid mass.   Cardiovascular:      Rate and Rhythm: Normal rate and regular rhythm.      Heart sounds: Normal heart sounds, S1 normal and S2 normal. No murmur heard.  Pulmonary:      Effort: " Pulmonary effort is normal. No respiratory distress.      Breath sounds: Normal breath sounds. No wheezing or rales.   Abdominal:      General: Bowel sounds are normal.      Palpations: Abdomen is soft. There is no mass.      Tenderness: There is no abdominal tenderness.   Musculoskeletal:         General: Normal range of motion.      Cervical back: Neck supple.   Lymphadenopathy:      Cervical: No cervical adenopathy.   Skin:     General: Skin is warm and dry.      Findings: No rash.   Neurological:      Mental Status: She is alert and oriented to person, place, and time.   Psychiatric:         Mood and Affect: Mood normal.         Behavior: Behavior normal.         Thought Content: Thought content normal.         Judgment: Judgment normal.               4/17/2025   Mini Cog   Clock Draw Score 2 Normal   3 Item Recall 3 objects recalled   Mini Cog Total Score 5         Vision Screen  Vision Screen Results: (!) REFER (getting cataracts done next month)  No Corrective Lenses, PLUS LENS REQUIRED: Pass      Signed Electronically by: Jigna Lees MD

## 2025-04-17 NOTE — PATIENT INSTRUCTIONS
To schedule with Cardiology-- please call [phone].     Patient Education   Preventive Care Advice   This is general advice given by our system to help you stay healthy. However, your care team may have specific advice just for you. Please talk to your care team about your preventive care needs.  Nutrition  Eat 5 or more servings of fruits and vegetables each day.  Try wheat bread, brown rice and whole grain pasta (instead of white bread, rice, and pasta).  Get enough calcium and vitamin D. Check the label on foods and aim for 100% of the RDA (recommended daily allowance).  Lifestyle  Exercise at least 150 minutes each week  (30 minutes a day, 5 days a week).  Do muscle strengthening activities 2 days a week. These help control your weight and prevent disease.  No smoking.  Wear sunscreen to prevent skin cancer.  Have a dental exam and cleaning every 6 months.  Yearly exams  See your health care team every year to talk about:  Any changes in your health.  Any medicines your care team has prescribed.  Preventive care, family planning, and ways to prevent chronic diseases.  Shots (vaccines)   HPV shots (up to age 26), if you've never had them before.  Hepatitis B shots (up to age 59), if you've never had them before.  COVID-19 shot: Get this shot when it's due.  Flu shot: Get a flu shot every year.  Tetanus shot: Get a tetanus shot every 10 years.  Pneumococcal, hepatitis A, and RSV shots: Ask your care team if you need these based on your risk.  Shingles shot (for age 50 and up)  General health tests  Diabetes screening:  Starting at age 35, Get screened for diabetes at least every 3 years.  If you are younger than age 35, ask your care team if you should be screened for diabetes.  Cholesterol test: At age 39, start having a cholesterol test every 5 years, or more often if advised.  Bone density scan (DEXA): At age 50, ask your care team if you should have this scan for osteoporosis (brittle bones).  Hepatitis C:  Get tested at least once in your life.  STIs (sexually transmitted infections)  Before age 24: Ask your care team if you should be screened for STIs.  After age 24: Get screened for STIs if you're at risk. You are at risk for STIs (including HIV) if:  You are sexually active with more than one person.  You don't use condoms every time.  You or a partner was diagnosed with a sexually transmitted infection.  If you are at risk for HIV, ask about PrEP medicine to prevent HIV.  Get tested for HIV at least once in your life, whether you are at risk for HIV or not.  Cancer screening tests  Cervical cancer screening: If you have a cervix, begin getting regular cervical cancer screening tests starting at age 21.  Breast cancer scan (mammogram): If you've ever had breasts, begin having regular mammograms starting at age 40. This is a scan to check for breast cancer.  Colon cancer screening: It is important to start screening for colon cancer at age 45.  Have a colonoscopy test every 10 years (or more often if you're at risk) Or, ask your provider about stool tests like a FIT test every year or Cologuard test every 3 years.  To learn more about your testing options, visit:   .  For help making a decision, visit:   https://bit.ly/vr06483.  Prostate cancer screening test: If you have a prostate, ask your care team if a prostate cancer screening test (PSA) at age 55 is right for you.  Lung cancer screening: If you are a current or former smoker ages 50 to 80, ask your care team if ongoing lung cancer screenings are right for you.  For informational purposes only. Not to replace the advice of your health care provider. Copyright   2023 [employer]. All rights reserved. Clinically reviewed by the [facility] Transitions Program. [employer] 695667 - REV 01/24.  Hearing Loss: Care Instructions  Overview     Hearing loss is a sudden or slow decrease in how well you hear. It can range from slight to profound.  Permanent hearing loss can occur with aging. It also can happen when you are exposed long-term to loud noise. Examples include listening to loud music, riding motorcycles, or being around other loud machines.  Hearing loss can affect your work and home life. It can make you feel lonely or depressed. You may feel that you have lost your independence. But hearing aids and other devices can help you hear better and feel connected to others.  Follow-up care is a key part of your treatment and safety. Be sure to make and go to all appointments, and call your doctor if you are having problems. It's also a good idea to know your test results and keep a list of the medicines you take.  How can you care for yourself at home?  Avoid loud noises whenever possible. This helps keep your hearing from getting worse.  Always wear hearing protection around loud noises.  Wear a hearing aid as directed.  A professional can help you pick a hearing aid that will work best for you.  You can also get hearing aids over the counter for mild to moderate hearing loss.  Have hearing tests as your doctor suggests. They can show whether your hearing has changed. Your hearing aid may need to be adjusted.  Use other devices as needed. These may include:  Telephone amplifiers and hearing aids that can connect to a television, stereo, radio, or microphone.  Devices that use lights or vibrations. These alert you to the doorbell, a ringing telephone, or a baby monitor.  Television closed-captioning. This shows the words at the bottom of the screen. Most new TVs can do this.  TTY (text telephone). This lets you type messages back and forth on the telephone instead of talking or listening. These devices are also called TDD. When messages are typed on the keyboard, they are sent over the phone line to a receiving TTY. The message is shown on a monitor.  Use text messaging, social media, and email if it is hard for you to communicate by telephone.  Try to  "learn a listening technique called speechreading. It is not lipreading. You pay attention to people's gestures, expressions, posture, and tone of voice. These clues can help you understand what a person is saying. Face the person you are talking to, and have them face you. Make sure the lighting is good. You need to see the other person's face clearly.  Think about counseling if you need help to adjust to your hearing loss.  When should you call for help?  Watch closely for changes in your health, and be sure to contact your doctor if:    You think your hearing is getting worse.     You have new symptoms, such as dizziness or nausea.   Where can you learn more?  Go to https://www.[employer].net/patiented  Enter R798 in the search box to learn more about \"Hearing Loss: Care Instructions.\"  Current as of: October 27, 2024  Content Version: 14.4    1034-8440 [employer].   Care instructions adapted under license by your healthcare professional. If you have questions about a medical condition or this instruction, always ask your healthcare professional. [employer] disclaims any warranty or liability for your use of this information.    Bladder Training: Care Instructions  Your Care Instructions     Bladder training is used to treat urge incontinence and stress incontinence. Urge incontinence means that the need to urinate comes on so fast that you can't get to a toilet in time. Stress incontinence means that you leak urine because of pressure on your bladder. For example, it may happen when you laugh, cough, or lift something heavy.  Bladder training can increase how long you can wait before you have to urinate. It can also help your bladder hold more urine. And it can give you better control over the urge to urinate.  It is important to remember that bladder training takes a few weeks to a few months to make a difference. You may not see results right away, but don't give up.  Follow-up care is a " key part of your treatment and safety. Be sure to make and go to all appointments, and call your doctor if you are having problems. It's also a good idea to know your test results and keep a list of the medicines you take.  How can you care for yourself at home?  Work with your doctor to come up with a bladder training program that is right for you. You may use one or more of the following methods.  Delayed urination  In the beginning, try to keep from urinating for 5 minutes after you first feel the need to go.  While you wait, take deep, slow breaths to relax. Kegel exercises can also help you delay the need to go to the bathroom.  After some practice, when you can easily wait 5 minutes to urinate, try to wait 10 minutes before you urinate.  Slowly increase the waiting period until you are able to control when you have to urinate.  Scheduled urination  Empty your bladder when you first wake up in the morning.  Schedule times throughout the day when you will urinate.  Start by going to the bathroom every hour, even if you don't need to go.  Slowly increase the time between trips to the bathroom.  When you have found a schedule that works well for you, keep doing it.  If you wake up during the night and have to urinate, do it. Apply your schedule to waking hours only.  Kegel exercises  These tighten and strengthen pelvic muscles, which can help you control the flow of urine. (If doing these exercises causes pain, stop doing them and talk with your doctor.) To do Kegel exercises:  Squeeze your muscles as if you were trying not to pass gas. Or squeeze your muscles as if you were stopping the flow of urine. Your belly, legs, and buttocks shouldn't move.  Hold the squeeze for 3 seconds, then relax for 5 to 10 seconds.  Start with 3 seconds, then add 1 second each week until you are able to squeeze for 10 seconds.  Repeat the exercise 10 times a session. Do 3 to 8 sessions a day.  When should you call for help?  Watch  "closely for changes in your health, and be sure to contact your doctor if:    Your incontinence is getting worse.     You do not get better as expected.   Where can you learn more?  Go to https://www.[employer].com.net/patiented  Enter V684 in the search box to learn more about \"Bladder Training: Care Instructions.\"  Current as of: April 30, 2024  Content Version: 14.4    3750-5476 [employer].   Care instructions adapted under license by your healthcare professional. If you have questions about a medical condition or this instruction, always ask your healthcare professional. [employer] disclaims any warranty or liability for your use of this information.       "

## 2025-05-07 ENCOUNTER — PATIENT OUTREACH (OUTPATIENT)
Dept: CARE COORDINATION | Facility: CLINIC | Age: 70
End: 2025-05-07
Payer: COMMERCIAL

## 2025-05-20 ENCOUNTER — OFFICE VISIT (OUTPATIENT)
Dept: FAMILY MEDICINE | Facility: OTHER | Age: 70
End: 2025-05-20
Payer: COMMERCIAL

## 2025-05-20 VITALS
WEIGHT: 142 LBS | RESPIRATION RATE: 16 BRPM | HEIGHT: 61 IN | DIASTOLIC BLOOD PRESSURE: 70 MMHG | SYSTOLIC BLOOD PRESSURE: 130 MMHG | OXYGEN SATURATION: 96 % | TEMPERATURE: 97.1 F | HEART RATE: 52 BPM | BODY MASS INDEX: 26.81 KG/M2

## 2025-05-20 DIAGNOSIS — Z01.818 PREOP GENERAL PHYSICAL EXAM: Primary | ICD-10-CM

## 2025-05-20 DIAGNOSIS — H26.9 CATARACT OF BOTH EYES, UNSPECIFIED CATARACT TYPE: ICD-10-CM

## 2025-05-20 DIAGNOSIS — Z12.31 VISIT FOR SCREENING MAMMOGRAM: ICD-10-CM

## 2025-05-20 DIAGNOSIS — I10 HYPERTENSION GOAL BP (BLOOD PRESSURE) < 140/90: ICD-10-CM

## 2025-05-20 LAB
ANION GAP SERPL CALCULATED.3IONS-SCNC: 9 MMOL/L (ref 7–15)
BUN SERPL-MCNC: 22.7 MG/DL (ref 8–23)
CALCIUM SERPL-MCNC: 10.2 MG/DL (ref 8.8–10.4)
CHLORIDE SERPL-SCNC: 101 MMOL/L (ref 98–107)
CREAT SERPL-MCNC: 0.96 MG/DL (ref 0.51–0.95)
EGFRCR SERPLBLD CKD-EPI 2021: 63 ML/MIN/1.73M2
GLUCOSE SERPL-MCNC: 80 MG/DL (ref 70–99)
HCO3 SERPL-SCNC: 29 MMOL/L (ref 22–29)
POTASSIUM SERPL-SCNC: 4.3 MMOL/L (ref 3.4–5.3)
SODIUM SERPL-SCNC: 139 MMOL/L (ref 135–145)

## 2025-05-20 PROCEDURE — 80048 BASIC METABOLIC PNL TOTAL CA: CPT | Performed by: FAMILY MEDICINE

## 2025-05-20 PROCEDURE — 3075F SYST BP GE 130 - 139MM HG: CPT | Performed by: FAMILY MEDICINE

## 2025-05-20 PROCEDURE — 99213 OFFICE O/P EST LOW 20 MIN: CPT | Performed by: FAMILY MEDICINE

## 2025-05-20 PROCEDURE — 3078F DIAST BP <80 MM HG: CPT | Performed by: FAMILY MEDICINE

## 2025-05-20 PROCEDURE — 36415 COLL VENOUS BLD VENIPUNCTURE: CPT | Performed by: FAMILY MEDICINE

## 2025-05-20 NOTE — PROGRESS NOTES
Preoperative Evaluation  [address]  [facility] [address]  Phone: [phone]  Fax: [phone]  Primary Provider: Jigna Lees MD  Pre-op Performing Provider: Jigna Lees MD  May 20, 2025             5/17/2025   Surgical Information   What procedure is being done? Cataract surgery left and right eye   Facility or Hospital where procedure/surgery will be performed: [facility]   Who is doing the procedure / surgery? Howie Mendoza   Date of surgery / procedure: 06/05/25.    and    06/19/25   Time of surgery / procedure: 11:00 am   Where do you plan to recover after surgery? at home with family     Fax number for surgical facility: Note does not need to be faxed, will be available electronically in Epic.    Assessment & Plan     The proposed surgical procedure is considered LOW risk.    Preop general physical exam    Cataract of both eyes, unspecified cataract type    Hypertension goal BP (blood pressure) < 140/90  Well controlled, labs drawn    - Basic metabolic panel  (Ca, Cl, CO2, Creat, Gluc, K, Na, BUN); Future    Visit for screening mammogram  - MA Screen Bilateral w/Fawad; Future      Antiplatelet or Anticoagulation Medication Instructions   - Bleeding risk is low for this procedure (e.g. dental, skin, cataract).    Additional Medication Instructions  Take all scheduled medications on the day of surgery    Recommendation  Approval given to proceed with proposed procedure, without further diagnostic evaluation.    Maria T Villeda is a 69 year old, presenting for the following:  Pre-Op Exam          5/20/2025    11:12 AM   Additional Questions   Roomed by octavio KRISHNA: bilateral cataracts          5/17/2025   Pre-Op Questionnaire   Have you ever had a heart attack or stroke? No   Have you ever had surgery on your heart or blood vessels, such as a stent placement, a coronary artery bypass, or surgery on an artery in your  "head, neck, heart, or legs? No   Do you have chest pain with activity? No   Do you have a history of heart failure? No   Do you currently have a cold, bronchitis or symptoms of other infection? No   Do you have a cough, shortness of breath, or wheezing? No   Do you or anyone in your family have previous history of blood clots? No   Do you or does anyone in your family have a serious bleeding problem such as prolonged bleeding following surgeries or cuts? No   Have you ever had problems with anemia or been told to take iron pills? No   Have you had any abnormal blood loss such as black, tarry or bloody stools, or abnormal vaginal bleeding? No   Have you ever had a blood transfusion? No   Are you willing to have a blood transfusion if it is medically needed before, during, or after your surgery? Yes   Have you or any of your relatives ever had problems with anesthesia? No   Do you have sleep apnea, excessive snoring or daytime drowsiness? No   Do you have any artifical heart valves or other implanted medical devices like a pacemaker, defibrillator, or continuous glucose monitor? No   Do you have artificial joints? (!) YES   Are you allergic to latex? No     Advance Care Planning    Discussed advance care planning with patient; informed AVS has link to [employer].    Preoperative Review of    reviewed - no record of controlled substances prescribed.      Patient Active Problem List    Diagnosis Date Noted    Paroxysmal supraventricular tachycardia 02/18/2025     Priority: Medium    History of cervical cancer 06/26/2013     Priority: Medium     1990 Hysterectomy due to cervical cancer. She was told it went deeper than they expected but they got it all.  10/27/09 NIL pap. Exam notes report, \"Cervix is without inflammation or abnormal discharge\". Continue cervical cancer screening.   8/25/11 NIL pap/neg HR HPV  6/26/13 NIL pap  11/8/16 NIL pap/neg HR HPV.         Hypertension goal BP (blood pressure) < 140/90 " 12/12/2010     Priority: Medium      Past Medical History:   Diagnosis Date    Arthritis     ostio    Cervix cancer (H) 01/01/1990    Depression     after 's death    History of left breast biopsy 01/01/2011    negative    HTN     Nonsenile cataract     UTI     history of      Past Surgical History:   Procedure Laterality Date    ARTHROPLASTY SHOULDER Left 02/12/2020    Procedure: Left shoulder hemiarthroplasy with bicep tenodesis;  Surgeon: Nura Cole MD;  Location: [facility]    ARTHROSCOPY SHOULDER Left 02/12/2020    Procedure: left shoulder arthroscopy;  Surgeon: Nura Cole MD;  Location: [facility]    BACK SURGERY  01/01/2009    BIOPSY BREAST  01/01/2011    benign    BLEPHAROPLASTY Bilateral 04/18/2022    Procedure: BLEPHAROPLASTY;  Surgeon: Lico Ma MD;  Location: [facility] OR    C/SECTION, CLASSICAL      x2    COLONOSCOPY  01/01/2005    COLONOSCOPY N/A 02/12/2018    Procedure: COLONOSCOPY;  COLONOSCOPY;  Surgeon: Ken Fair MD;  Location:  GI    GENITOURINARY SURGERY  2022    Bladder sling    HEAD & NECK SURGERY  01/01/2011    HERNIA REPAIR  01/01/2003    HYSTERECTOMY  01/01/1990    one ovary removed, h/o cervical cancer    REPAIR PTOSIS      SLING TRANSVAGINAL N/A 05/15/2023    Procedure: Pubovaginal sling with Altis;  Surgeon: Ken Murray MD;  Location: [facility] OR    ZZC APPENDECTOMY      ZZC ENLARGE BREAST  01/01/2006    ZZC NONSPECIFIC PROCEDURE  02/01/2004    ventral hernia repair    ZZC NONSPECIFIC PROCEDURE  12/29/2011    Lumbar fusion L5-S1    ZZC NONSPECIFIC PROCEDURE      cervical fusion C3     Current Outpatient Medications   Medication Sig Dispense Refill    atenolol-chlorthalidone (TENORETIC) 50-25 MG tablet Take 1 tablet by mouth daily. 90 tablet 3    atorvastatin (LIPITOR) 20 MG tablet Take 1 tablet (20 mg) by mouth daily. 90 tablet 2    estradiol (ESTRACE) 0.5 MG tablet Take 1 tablet (0.5 mg) by mouth daily 90 tablet 3    losartan (COZAAR) 25 MG tablet  "Take 1 tablet (25 mg) by mouth 2 times daily. 180 tablet 3    meloxicam (MOBIC) 15 MG tablet TAKE 1 TABLET DAILY AS NEEDED 90 tablet 3    Multiple Vitamin (MULTIVITAMIN OR) Take 1 tablet by mouth daily.      potassium chloride ER (K-TAB) 20 MEQ CR tablet TAKE 1 TABLET THREE TIMES A  tablet 3    ST JOHNS WORT PO Take by mouth 2 times daily          No Known Allergies     Social History     Tobacco Use    Smoking status: Former     Current packs/day: 0.00     Average packs/day: 1 pack/day for 2.0 years (2.0 ttl pk-yrs)     Types: Cigarettes     Start date: 2008     Quit date: 2010     Years since quittin.8     Passive exposure: Never    Smokeless tobacco: Never    Tobacco comments:     no use of tobacco products   Substance Use Topics    Alcohol use: Yes     Comment: Socially       History   Drug Use Unknown             Review of Systems  CONSTITUTIONAL: NEGATIVE for fever, chills, change in weight  INTEGUMENTARY/SKIN: NEGATIVE for worrisome rashes, moles or lesions  EYES:  catarcts  ENT/MOUTH: NEGATIVE for ear, mouth and throat problems  RESP: NEGATIVE for significant cough or SOB  CV: NEGATIVE for chest pain, palpitations or peripheral edema  GI: NEGATIVE for nausea, abdominal pain, heartburn, or change in bowel habits  : NEGATIVE for frequency, dysuria, or hematuria  MUSCULOSKELETAL: NEGATIVE for significant arthralgias or myalgia  NEURO: NEGATIVE for weakness, dizziness or paresthesias  ENDOCRINE: NEGATIVE for temperature intolerance, skin/hair changes  HEME: NEGATIVE for bleeding problems  PSYCHIATRIC: NEGATIVE for changes in mood or affect    Objective    /70 (BP Location: Left arm, Patient Position: Sitting, Cuff Size: Adult Regular)   Pulse 52   Temp 97.1  F (36.2  C) (Temporal)   Resp 16   Ht 1.549 m (5' 1\")   Wt 64.4 kg (142 lb)   SpO2 96%   BMI 26.83 kg/m     Estimated body mass index is 26.83 kg/m  as calculated from the following:    Height as of this encounter: 1.549 m " "(5' 1\").    Weight as of this encounter: 64.4 kg (142 lb).  Physical Exam  GENERAL: alert and no distress  EYES: Eyes grossly normal to inspection, PERRL and conjunctivae and sclerae normal  HENT: ear canals and TM's normal, nose and mouth without ulcers or lesions  NECK: no adenopathy, no asymmetry, masses, or scars  RESP: lungs clear to auscultation - no rales, rhonchi or wheezes  CV: regular rate and rhythm, normal S1 S2, no S3 or S4, no murmur, click or rub, no peripheral edema  ABDOMEN: soft, nontender, no hepatosplenomegaly, no masses and bowel sounds normal  MS: no gross musculoskeletal defects noted, no edema  SKIN: no suspicious lesions or rashes  NEURO: Normal strength and tone, mentation intact and speech normal  PSYCH: mentation appears normal, affect normal/bright    Recent Labs   Lab Test 11/19/24  1415   HGB 12.1         POTASSIUM 3.9   CR 1.07*        Diagnostics  Labs pending at this time.  Results will be reviewed when available.   No EKG required for low risk surgery (cataract, skin procedure, breast biopsy, etc).    Revised Cardiac Risk Index (RCRI)  The patient has the following serious cardiovascular risks for perioperative complications:   - No serious cardiac risks = 0 points     RCRI Interpretation: 0 points: Class I (very low risk - 0.4% complication rate)         Signed Electronically by: Jigna Lees MD  A copy of this evaluation report is provided to the requesting physician.         "

## 2025-05-21 ENCOUNTER — PATIENT OUTREACH (OUTPATIENT)
Dept: CARE COORDINATION | Facility: CLINIC | Age: 70
End: 2025-05-21
Payer: COMMERCIAL

## 2025-05-21 ENCOUNTER — RESULTS FOLLOW-UP (OUTPATIENT)
Dept: FAMILY MEDICINE | Facility: OTHER | Age: 70
End: 2025-05-21

## 2025-06-04 ENCOUNTER — ANESTHESIA EVENT (OUTPATIENT)
Dept: SURGERY | Facility: CLINIC | Age: 70
End: 2025-06-04
Payer: COMMERCIAL

## 2025-06-04 ASSESSMENT — LIFESTYLE VARIABLES: TOBACCO_USE: 1

## 2025-06-05 ENCOUNTER — ANESTHESIA (OUTPATIENT)
Dept: SURGERY | Facility: CLINIC | Age: 70
End: 2025-06-05
Payer: COMMERCIAL

## 2025-06-05 ENCOUNTER — HOSPITAL ENCOUNTER (OUTPATIENT)
Facility: CLINIC | Age: 70
Discharge: HOME | End: 2025-06-05
Attending: STUDENT IN AN ORGANIZED HEALTH CARE EDUCATION/TRAINING PROGRAM | Admitting: STUDENT IN AN ORGANIZED HEALTH CARE EDUCATION/TRAINING PROGRAM
Payer: COMMERCIAL

## 2025-06-05 VITALS
DIASTOLIC BLOOD PRESSURE: 77 MMHG | TEMPERATURE: 97.8 F | RESPIRATION RATE: 16 BRPM | HEART RATE: 51 BPM | OXYGEN SATURATION: 97 % | SYSTOLIC BLOOD PRESSURE: 160 MMHG

## 2025-06-05 PROCEDURE — 250N000011 HC RX IP 250 OP 636

## 2025-06-05 PROCEDURE — 250N000009 HC RX 250: Performed by: STUDENT IN AN ORGANIZED HEALTH CARE EDUCATION/TRAINING PROGRAM

## 2025-06-05 PROCEDURE — V2632 POST CHMBR INTRAOCULAR LENS: HCPCS | Performed by: STUDENT IN AN ORGANIZED HEALTH CARE EDUCATION/TRAINING PROGRAM

## 2025-06-05 PROCEDURE — 761N000008 HC RECOVERY CATRACT PACKAGE: Performed by: STUDENT IN AN ORGANIZED HEALTH CARE EDUCATION/TRAINING PROGRAM

## 2025-06-05 PROCEDURE — 370N000004 HC ANESTHESIA CATARACT PACKAGE: Performed by: STUDENT IN AN ORGANIZED HEALTH CARE EDUCATION/TRAINING PROGRAM

## 2025-06-05 PROCEDURE — 360N000007 HC CATARACT SURGICAL PACKAGE: Performed by: STUDENT IN AN ORGANIZED HEALTH CARE EDUCATION/TRAINING PROGRAM

## 2025-06-05 PROCEDURE — 250N000011 HC RX IP 250 OP 636: Performed by: STUDENT IN AN ORGANIZED HEALTH CARE EDUCATION/TRAINING PROGRAM

## 2025-06-05 DEVICE — EYE IMP IOL AMO PCL TECNIS ZCB00 20.0: Type: IMPLANTABLE DEVICE | Site: EYE | Status: FUNCTIONAL

## 2025-06-05 RX ORDER — PREDNISOLONE ACETATE 10 MG/ML
SUSPENSION/ DROPS OPHTHALMIC PRN
Status: DISCONTINUED | OUTPATIENT
Start: 2025-06-05 | End: 2025-06-05 | Stop reason: HOSPADM

## 2025-06-05 RX ORDER — PROPARACAINE HYDROCHLORIDE 5 MG/ML
1 SOLUTION/ DROPS OPHTHALMIC ONCE
Status: COMPLETED | OUTPATIENT
Start: 2025-06-05 | End: 2025-06-05

## 2025-06-05 RX ORDER — PROPARACAINE HYDROCHLORIDE 5 MG/ML
1 SOLUTION/ DROPS OPHTHALMIC ONCE
Status: DISCONTINUED | OUTPATIENT
Start: 2025-06-05 | End: 2025-06-05 | Stop reason: HOSPADM

## 2025-06-05 RX ORDER — PREDNISOLONE ACETATE 10 MG/ML
1 SUSPENSION/ DROPS OPHTHALMIC
COMMUNITY
Start: 2025-05-20

## 2025-06-05 RX ORDER — ONDANSETRON 4 MG/1
4 TABLET, ORALLY DISINTEGRATING ORAL EVERY 30 MIN PRN
Status: DISCONTINUED | OUTPATIENT
Start: 2025-06-05 | End: 2025-06-05 | Stop reason: HOSPADM

## 2025-06-05 RX ORDER — TROPICAMIDE 10 MG/ML
1 SOLUTION/ DROPS OPHTHALMIC
Status: COMPLETED | OUTPATIENT
Start: 2025-06-05 | End: 2025-06-05

## 2025-06-05 RX ORDER — NALOXONE HYDROCHLORIDE 0.4 MG/ML
0.1 INJECTION, SOLUTION INTRAMUSCULAR; INTRAVENOUS; SUBCUTANEOUS
Status: DISCONTINUED | OUTPATIENT
Start: 2025-06-05 | End: 2025-06-05 | Stop reason: HOSPADM

## 2025-06-05 RX ORDER — ONDANSETRON 2 MG/ML
4 INJECTION INTRAMUSCULAR; INTRAVENOUS EVERY 30 MIN PRN
Status: DISCONTINUED | OUTPATIENT
Start: 2025-06-05 | End: 2025-06-05 | Stop reason: HOSPADM

## 2025-06-05 RX ORDER — POVIDONE-IODINE 5 %
1 SOLUTION, NON-ORAL OPHTHALMIC (EYE) ONCE
Status: DISCONTINUED | OUTPATIENT
Start: 2025-06-05 | End: 2025-06-05 | Stop reason: HOSPADM

## 2025-06-05 RX ORDER — MOXIFLOXACIN 5 MG/ML
1 SOLUTION/ DROPS OPHTHALMIC
Status: COMPLETED | OUTPATIENT
Start: 2025-06-05 | End: 2025-06-05

## 2025-06-05 RX ORDER — DICLOFENAC SODIUM 1 MG/ML
1 SOLUTION/ DROPS OPHTHALMIC
Status: COMPLETED | OUTPATIENT
Start: 2025-06-05 | End: 2025-06-05

## 2025-06-05 RX ORDER — PHENYLEPHRINE HYDROCHLORIDE 25 MG/ML
1 SOLUTION/ DROPS OPHTHALMIC
Status: COMPLETED | OUTPATIENT
Start: 2025-06-05 | End: 2025-06-05

## 2025-06-05 RX ORDER — MOXIFLOXACIN 5 MG/ML
1 SOLUTION/ DROPS OPHTHALMIC 3 TIMES DAILY
COMMUNITY
Start: 2025-05-20

## 2025-06-05 RX ORDER — DEXAMETHASONE SODIUM PHOSPHATE 10 MG/ML
4 INJECTION, SOLUTION INTRAMUSCULAR; INTRAVENOUS
Status: DISCONTINUED | OUTPATIENT
Start: 2025-06-05 | End: 2025-06-05 | Stop reason: HOSPADM

## 2025-06-05 RX ORDER — KETOROLAC TROMETHAMINE 5 MG/ML
1 SOLUTION OPHTHALMIC 4 TIMES DAILY
COMMUNITY
Start: 2025-05-20

## 2025-06-05 RX ADMIN — MOXIFLOXACIN HYDROCHLORIDE 1 DROP: 5 SOLUTION/ DROPS OPHTHALMIC at 11:31

## 2025-06-05 RX ADMIN — MOXIFLOXACIN HYDROCHLORIDE 1 DROP: 5 SOLUTION/ DROPS OPHTHALMIC at 11:40

## 2025-06-05 RX ADMIN — TROPICAMIDE 1 DROP: 10 SOLUTION/ DROPS OPHTHALMIC at 11:35

## 2025-06-05 RX ADMIN — MOXIFLOXACIN HYDROCHLORIDE 1 DROP: 5 SOLUTION/ DROPS OPHTHALMIC at 11:35

## 2025-06-05 RX ADMIN — MIDAZOLAM 1 MG: 1 INJECTION INTRAMUSCULAR; INTRAVENOUS at 12:50

## 2025-06-05 RX ADMIN — PHENYLEPHRINE HYDROCHLORIDE 1 DROP: 25 SOLUTION/ DROPS OPHTHALMIC at 11:35

## 2025-06-05 RX ADMIN — TROPICAMIDE 1 DROP: 10 SOLUTION/ DROPS OPHTHALMIC at 11:31

## 2025-06-05 RX ADMIN — PHENYLEPHRINE HYDROCHLORIDE 1 DROP: 25 SOLUTION/ DROPS OPHTHALMIC at 11:31

## 2025-06-05 RX ADMIN — PROPARACAINE HYDROCHLORIDE 1 DROP: 5 SOLUTION/ DROPS OPHTHALMIC at 11:30

## 2025-06-05 RX ADMIN — DICLOFENAC SODIUM 1 DROP: 1 SOLUTION OPHTHALMIC at 11:40

## 2025-06-05 RX ADMIN — PHENYLEPHRINE HYDROCHLORIDE 1 DROP: 25 SOLUTION/ DROPS OPHTHALMIC at 11:40

## 2025-06-05 RX ADMIN — TROPICAMIDE 1 DROP: 10 SOLUTION/ DROPS OPHTHALMIC at 11:40

## 2025-06-05 RX ADMIN — DICLOFENAC SODIUM 1 DROP: 1 SOLUTION OPHTHALMIC at 11:31

## 2025-06-05 RX ADMIN — DICLOFENAC SODIUM 1 DROP: 1 SOLUTION OPHTHALMIC at 11:35

## 2025-06-05 ASSESSMENT — ACTIVITIES OF DAILY LIVING (ADL)
ADLS_ACUITY_SCORE: 41

## 2025-06-05 NOTE — ANESTHESIA CARE TRANSFER NOTE
Patient: Christiana Lopes    Procedure: Procedure(s):  Phacoemulsification with standard intraocular lens implant Left Eye       Diagnosis: Cataract [H26.9]  Diagnosis Additional Information: No value filed.    Anesthesia Type:   MAC     Note:    Oropharynx: oropharynx clear of all foreign objects and spontaneously breathing  Level of Consciousness: drowsy  Oxygen Supplementation: room air    Independent Airway: airway patency satisfactory and stable  Dentition: dentition unchanged  Vital Signs Stable: post-procedure vital signs reviewed and stable  Report to RN Given: handoff report given  Patient transferred to: Phase II    Handoff Report: Identifed the Patient, Identified the Reponsible Provider, Reviewed the pertinent medical history, Discussed the surgical course, Reviewed Intra-OP anesthesia mangement and issues during anesthesia, Set expectations for post-procedure period and Allowed opportunity for questions and acknowledgement of understanding  Vitals:  Vitals Value Taken Time   /73 06/05/25 13:20   Temp     Pulse 52 06/05/25 13:20   Resp 16 06/05/25 13:16   SpO2 98 % 06/05/25 13:28   Vitals shown include unfiled device data.    Electronically Signed By: LOC Johnson CRNA  June 5, 2025  1:30 PM

## 2025-06-05 NOTE — DISCHARGE INSTRUCTIONS
[facility]Day [facility]   Adult Discharge Orders & Instructions     For 24 hours after surgery    Get plenty of rest.  A responsible adult must stay with you for at least 24 hours after you leave the hospital.   Do not drive or use heavy equipment.  If you have weakness or tingling, don't drive or use heavy equipment until this feeling goes away.  Do not drink alcohol.  Avoid strenuous or risky activities.  Ask for help when climbing stairs.   You may feel lightheaded.  If so, sit for a few minutes before standing.  Have someone help you get up.   You may have a slight fever. Call the doctor if your fever is over 100 F (37.7 C) (taken under the tongue) or lasts longer than 24 hours.  You may have a dry mouth, a sore throat, muscle aches or trouble sleeping.  These should go away after 24 hours.  Do not make important or legal decisions.  We don t expect you to have any problems from the surgery or treatment you had today. Just in case, here s what to do if you have pain, upset stomach (nausea), bleeding or infection:  Pain:  Take medicines your doctor has prescribed or over-the-counter medicine they have suggested. Resting and using ice packs can help, too. For surgery on an arm or leg, raise it on a pillow to ease swelling. Call your doctor if these methods don t work.  Copyright Renny Wright, Licensed under [employer]  Upset stomach (nausea):  Take anti-nausea medicine approved by your doctor. Drink clear liquids like apple juice, ginger ale, broth or 7-Up. Be sure to drink enough fluids. Rest can help, too. Move to normal foods when you re ready.   Bleeding:  In the first 24 hours, you may see a little blood on your dressing, about the size of a quarter. You don t need to worry about this much blood, but if the blood spot keeps getting bigger:  Put pressure on the wound if you can, AND  Call your doctor.  Copyright [employer], Licensed under [employer]  Fever/Infection: Please call  your doctor if you have any of these signs:  Redness  Swelling  Wound feels warm  Pain gets worse  Bad-smelling fluid leaks from wound  Fever or chills  Call your doctor for any of the followin.  It has been over 8 to 10 hours since surgery and you are still not able to urinate (pass water).    2.  Headache for over 24 hours.    3.  Numbness, tingling or weakness in your legs the day after surgery (if you had spinal anesthesia).    For patient questions :  Any specialty not related to the above groups: [phone]

## 2025-06-05 NOTE — BRIEF OP NOTE
[facility]    Brief Operative Note    Pre-operative diagnosis: Cataract, Left Eye  Post-operative diagnosis Same as pre-operative diagnosis    Procedure: Phacoemulsification with standard intraocular lens implant Left Eye, Left - Eye    Surgeon: Surgeons and Role:     * Howie Mendoza MD - Primary  Anesthesia: MAC with Topical  Estimated Blood Loss: 0 mL from 6/5/2025 12:52 PM to 6/5/2025  1:15 PM      Drains: None  Specimens: * No specimens in log *  Findings:   None.  Complications: None.  Implants:   Implant Name Type Inv. Item Serial No.  Lot No. LRB No. Used Action   EYE IMP IOL ROLAN PCL TECNIS ZCB00 20.0 - [id number] Lens/Eye Implant EYE IMP IOL ROLAN PCL TECNIS ZCB00 20.0 [id number] [employer]  Left 1 Implanted

## 2025-06-05 NOTE — ANESTHESIA PREPROCEDURE EVALUATION
Anesthesia Pre-Procedure Evaluation    Patient: Christiana Lopes   MRN: [id number] : 1955          Procedure : Procedure(s):  Phacoemulsification with standard intraocular lens implant         Past Medical History:   Diagnosis Date    Arthritis     ostio    Cervix cancer (H) 1990    Depression     after 's death    History of left breast biopsy 2011    negative    HTN     Nonsenile cataract     UTI     history of       Past Surgical History:   Procedure Laterality Date    ARTHROPLASTY SHOULDER Left 2020    Procedure: Left shoulder hemiarthroplasy with bicep tenodesis;  Surgeon: Nura Cole MD;  Location: [facility]    ARTHROSCOPY SHOULDER Left 2020    Procedure: left shoulder arthroscopy;  Surgeon: Nura Cole MD;  Location:  OR    BACK SURGERY  2009    BIOPSY BREAST  2011    benign    BLEPHAROPLASTY Bilateral 2022    Procedure: BLEPHAROPLASTY;  Surgeon: Lico Ma MD;  Location: [facility] OR    C/SECTION, CLASSICAL      x2    COLONOSCOPY  2005    COLONOSCOPY N/A 2018    Procedure: COLONOSCOPY;  COLONOSCOPY;  Surgeon: Ken Fair MD;  Location:  GI    GENITOURINARY SURGERY      Bladder sling    HEAD & NECK SURGERY  2011    HERNIA REPAIR  2003    HYSTERECTOMY  1990    one ovary removed, h/o cervical cancer    REPAIR PTOSIS      SLING TRANSVAGINAL N/A 05/15/2023    Procedure: Pubovaginal sling with Altis;  Surgeon: Ken Murray MD;  Location:  OR    ZZC APPENDECTOMY      ZZC ENLARGE BREAST  2006    ZZC NONSPECIFIC PROCEDURE  2004    ventral hernia repair    ZZC NONSPECIFIC PROCEDURE  2011    Lumbar fusion L5-S1    ZZC NONSPECIFIC PROCEDURE      cervical fusion C3      No Known Allergies   Social History     Tobacco Use    Smoking status: Former     Current packs/day: 0.00     Average packs/day: 1 pack/day for 2.0 years (2.0 ttl pk-yrs)     Types: Cigarettes     Start  date: 2008     Quit date: 2010     Years since quittin.8     Passive exposure: Never    Smokeless tobacco: Never    Tobacco comments:     no use of tobacco products   Substance Use Topics    Alcohol use: Yes     Comment: Socially      Wt Readings from Last 1 Encounters:   25 64.4 kg (142 lb)        Anesthesia Evaluation   Pt has had prior anesthetic. Type: General and MAC.        ROS/MED HX  ENT/Pulmonary:  - neg pulmonary ROS   (+)                tobacco use, Past use,                       Neurologic:  - neg neurologic ROS     Cardiovascular:     (+)  hypertension- -   -  - -                                 Previous cardiac testing   Echo: Date: Results:    Stress Test:  Date: 3/13/19 Results:  This was a normal stress echocardiogram with no evidence of stress-induced  ischemia.  Good exercise tolernance ( 10:32 Tony protocol / 12/5 Mets)     Despite developing 1.5 mm asymptomatic inferolateral ST segment depression,  the patient exhibited a normal echocardiographic response to exercise ( all  wall mtion improved, estimated global LV systolic performance is hyperdynamic  and end systolic volumes decrease)  _____________________________________________________________________________  __     Stress  Exercise was stopped due to dyspnea.  There was a normal BP response to exercise.  Target Heart Rate was achieved.  There was a maximum 1.5mm ST segment depression in the inferior lead(s).  There was a maximum 1.5mm ST segment depression in the lateral lead(s).  This was a normal stress echocardiogram with no evidence of stress-induced  ischemia.  The visual ejection fraction is estimated at >70%.  Global LV systolic function augments with exercise.  Left ventricular cavity size decreases with exercise.  Normal resting wall motion and no stress-induced wall motion abnormality.     Baseline  The patient is in normal sinus rhythm.  Resting ECG is normal.  The visual ejection fraction is estimated at  "55-60%.  No regional wall motion abnormalities noted.    ECG Reviewed:  Date: 3/13/19 Results:  SR  Cath:  Date: Results:      METS/Exercise Tolerance:     Hematologic:  - neg hematologic  ROS     Musculoskeletal:   (+)  arthritis,             GI/Hepatic:  - neg GI/hepatic ROS     Renal/Genitourinary:  - neg Renal ROS     Endo:       Psychiatric/Substance Use:     (+) psychiatric history depression       Infectious Disease:  - neg infectious disease ROS     Malignancy: Comment: Cervical   (+) Malignancy, History of Other.Other CA status post Surgery.    Other:              Physical Exam  Airway  Mallampati: II  TM distance: >3 FB  Neck ROM: full  Mouth opening: >= 4 cm    Cardiovascular - normal exam  Rhythm: regular  Rate: normal rate     Dental   (+) Completely normal teeth      Pulmonary - normal examBreath sounds clear to auscultation        Neurological - normal exam  She appears awake, alert and oriented x3.    Other Findings       OUTSIDE LABS:  CBC:   Lab Results   Component Value Date    WBC 6.0 11/19/2024    WBC 5.5 01/11/2024    HGB 12.1 11/19/2024    HGB 12.6 01/11/2024    HCT 36.5 11/19/2024    HCT 38.5 01/11/2024     11/19/2024     01/11/2024     BMP:   Lab Results   Component Value Date     05/20/2025     11/19/2024    POTASSIUM 4.3 05/20/2025    POTASSIUM 3.9 11/19/2024    CHLORIDE 101 05/20/2025    CHLORIDE 104 11/19/2024    CO2 29 05/20/2025    CO2 26 11/19/2024    BUN 22.7 05/20/2025    BUN 21.9 11/19/2024    CR 0.96 (H) 05/20/2025    CR 1.07 (H) 11/19/2024    GLC 80 05/20/2025    GLC 88 11/19/2024     COAGS: No results found for: \"PTT\", \"INR\", \"FIBR\"  POC: No results found for: \"BGM\", \"HCG\", \"HCGS\"  HEPATIC:   Lab Results   Component Value Date    ALBUMIN 4.5 04/16/2024    PROTTOTAL 7.4 04/16/2024    ALT 19 04/16/2024    AST 23 04/16/2024    ALKPHOS 42 04/16/2024    BILITOTAL 0.2 04/16/2024     OTHER:   Lab Results   Component Value Date    JERRI 10.2 05/20/2025    PHOS " "3.4 10/27/2009    MAG 2.1 11/19/2024    TSH 2.10 11/19/2024    CRP <5.0 07/08/2014    SED 9 07/08/2014       Anesthesia Plan    ASA Status:  2      NPO Status: NPO Appropriate   Anesthesia Type: MAC.  Airway: natural airway.  Induction: intravenous.  Maintenance: TIVA.   Techniques and Equipment:       - Monitoring Plan: standard ASA monitoring     Consents    Anesthesia Plan(s) and associated risks, benefits, and realistic alternatives discussed. Questions answered and patient/representative(s) expressed understanding.     - Discussed: surgeon     - Discussed with:  Patient        - Pt is DNR/DNI Status: no DNR     Blood Consent:      - Discussed with: patient.     - Consented: consented to blood products     Postoperative Care    Pain management: non-narcotic analgesics.     Comments:    Other Comments: The risks and benefits of anesthesia, and the alternatives where applicable, have been discussed with the patient, and they wish to proceed.                  LOC Johnson CRNA    I have reviewed the pertinent notes and labs in the chart from the past 30 days and (re)examined the patient.  Any updates or changes from those notes are reflected in this note.    Clinically Significant Risk Factors Present on Admission                   # Hypertension: Noted on problem list           # Overweight: Estimated body mass index is 26.83 kg/m  as calculated from the following:    Height as of 5/20/25: 1.549 m (5' 1\").    Weight as of 5/20/25: 64.4 kg (142 lb).                    "

## 2025-06-05 NOTE — ANESTHESIA POSTPROCEDURE EVALUATION
Patient: Christiana Lopes    Procedure: Procedure(s):  Phacoemulsification with standard intraocular lens implant Left Eye       Anesthesia Type:  MAC    Note:  Disposition: Outpatient   Postop Pain Control: Uneventful            Sign Out: Well controlled pain   PONV: No   Neuro/Psych: Uneventful            Sign Out: Acceptable/Baseline neuro status   Airway/Respiratory: Uneventful            Sign Out: Acceptable/Baseline resp. status   CV/Hemodynamics: Uneventful            Sign Out: Acceptable CV status   Other NRE: NONE   DID A NON-ROUTINE EVENT OCCUR? No    Event details/Postop Comments:  Pt was happy with anesthesia care.  No complications.  I will follow up with the pt if needed.       Last vitals:  Vitals Value Taken Time   /73 06/05/25 13:20   Temp     Pulse 52 06/05/25 13:20   Resp 16 06/05/25 13:16   SpO2 99 % 06/05/25 13:29   Vitals shown include unfiled device data.    Electronically Signed By: LOC Johnson CRNA  June 5, 2025  1:31 PM

## 2025-06-05 NOTE — OP NOTE
[facility]  Ophthalmology Operative Note    PREOPERATIVE DIAGNOSIS: Cataract, Left eye.     POSTOPERATIVE DIAGNOSIS: Cataract, Left eye.     OPERATION: Cataract extraction with placement of posterior chamber intraocular lens in the Left eye.     ANESTHESIA: MAC combined with topical     INDICATIONS FOR PROCEDURE: Christiana Lopes was seen in the [facility] for decreased visual acuity in the Left eye. The patient was found to have a visually significant cataract in the Left eye. The risks, benefits, alternatives and goals of cataract extraction were discussed with the patient, and after adequate discussion the patient understood and agreed to these, and a signed informed consent was obtained prior to the procedure.     DESCRIPTION OF PROCEDURE: After proper patient identification, topical anesthesia was applied to the Left eye. The patient was brought to the operating room and the Left eye was prepped and draped in the usual sterile fashion for intraocular surgery. A lid speculum was placed in the Left eye. A paracentesis was then created and the anterior chamber was filled with 1% non-preserved lidocaine followed by Viscoat. A clear corneal incision was then created temporally using a 2.4mm keratome. A continuous curvilinear capsulorrhexis was then created using a cystotome and Utrata forceps. Hydrodissection was carried out with BSS on a Fofana cannula and the lens rotated freely within the capsular bag. Phacoemulsification was then carried out using the stop and chop technique. Residual cortical material was removed using the I&A handpiece. The capsular bag was then filled with Provisc and a ZCB00 +20.0 diopter intraocular lens was then injected into the capsular bag. The lens showed good centration and stability. Residual viscoelastic was removed using the I&A handpiece. The wound was then hydrated and the anterior chamber reformed. Intracameral Moxifloxacin was then  injected into the anterior chamber. The wounds were then checked and found to be sealed. Topical Prednisolone drops were placed in the patient's Left eye followed by a Stanley shield over the top of this. The patient tolerated the procedure well without complications and was told to follow up in the clinic in the next postoperative day.     Implant Name Type Inv. Item Serial No.  Lot No. LRB No. Used Action   EYE IMP IOL ROLAN PCL TECNIS ZCB00 20.0 - [id number] Lens/Eye Implant EYE IMP IOL ROLAN PCL TECNIS ZCB00 20.0 [id number] [employer]  Left 1 Implanted        Howie Mendoza MD

## 2025-06-19 ENCOUNTER — HOSPITAL ENCOUNTER (OUTPATIENT)
Facility: CLINIC | Age: 70
Discharge: HOME | End: 2025-06-19
Attending: STUDENT IN AN ORGANIZED HEALTH CARE EDUCATION/TRAINING PROGRAM | Admitting: STUDENT IN AN ORGANIZED HEALTH CARE EDUCATION/TRAINING PROGRAM
Payer: COMMERCIAL

## 2025-06-19 ENCOUNTER — ANESTHESIA EVENT (OUTPATIENT)
Dept: SURGERY | Facility: CLINIC | Age: 70
End: 2025-06-19
Payer: COMMERCIAL

## 2025-06-19 ENCOUNTER — ANESTHESIA (OUTPATIENT)
Dept: SURGERY | Facility: CLINIC | Age: 70
End: 2025-06-19
Payer: COMMERCIAL

## 2025-06-19 VITALS
TEMPERATURE: 97.1 F | RESPIRATION RATE: 16 BRPM | SYSTOLIC BLOOD PRESSURE: 171 MMHG | OXYGEN SATURATION: 97 % | DIASTOLIC BLOOD PRESSURE: 74 MMHG | HEART RATE: 53 BPM

## 2025-06-19 PROCEDURE — 360N000007 HC CATARACT SURGICAL PACKAGE: Performed by: STUDENT IN AN ORGANIZED HEALTH CARE EDUCATION/TRAINING PROGRAM

## 2025-06-19 PROCEDURE — 370N000004 HC ANESTHESIA CATARACT PACKAGE: Performed by: STUDENT IN AN ORGANIZED HEALTH CARE EDUCATION/TRAINING PROGRAM

## 2025-06-19 PROCEDURE — 250N000011 HC RX IP 250 OP 636: Performed by: NURSE ANESTHETIST, CERTIFIED REGISTERED

## 2025-06-19 PROCEDURE — 250N000011 HC RX IP 250 OP 636: Performed by: STUDENT IN AN ORGANIZED HEALTH CARE EDUCATION/TRAINING PROGRAM

## 2025-06-19 PROCEDURE — 761N000008 HC RECOVERY CATRACT PACKAGE: Performed by: STUDENT IN AN ORGANIZED HEALTH CARE EDUCATION/TRAINING PROGRAM

## 2025-06-19 PROCEDURE — 250N000009 HC RX 250: Performed by: STUDENT IN AN ORGANIZED HEALTH CARE EDUCATION/TRAINING PROGRAM

## 2025-06-19 PROCEDURE — 250N000009 HC RX 250: Performed by: NURSE ANESTHETIST, CERTIFIED REGISTERED

## 2025-06-19 PROCEDURE — V2632 POST CHMBR INTRAOCULAR LENS: HCPCS | Performed by: STUDENT IN AN ORGANIZED HEALTH CARE EDUCATION/TRAINING PROGRAM

## 2025-06-19 DEVICE — EYE IMP IOL AMO PCL TECNIS ZCB00 20.5: Type: IMPLANTABLE DEVICE | Site: EYE | Status: FUNCTIONAL

## 2025-06-19 RX ORDER — PROPARACAINE HYDROCHLORIDE 5 MG/ML
1 SOLUTION/ DROPS OPHTHALMIC ONCE
Status: DISCONTINUED | OUTPATIENT
Start: 2025-06-19 | End: 2025-06-19 | Stop reason: HOSPADM

## 2025-06-19 RX ORDER — PROPARACAINE HYDROCHLORIDE 5 MG/ML
1 SOLUTION/ DROPS OPHTHALMIC ONCE
Status: COMPLETED | OUTPATIENT
Start: 2025-06-19 | End: 2025-06-19

## 2025-06-19 RX ORDER — MOXIFLOXACIN 5 MG/ML
1 SOLUTION/ DROPS OPHTHALMIC
Status: COMPLETED | OUTPATIENT
Start: 2025-06-19 | End: 2025-06-19

## 2025-06-19 RX ORDER — TROPICAMIDE 10 MG/ML
1 SOLUTION/ DROPS OPHTHALMIC
Status: COMPLETED | OUTPATIENT
Start: 2025-06-19 | End: 2025-06-19

## 2025-06-19 RX ORDER — POVIDONE-IODINE 5 %
1 SOLUTION, NON-ORAL OPHTHALMIC (EYE) ONCE
Status: DISCONTINUED | OUTPATIENT
Start: 2025-06-19 | End: 2025-06-19 | Stop reason: HOSPADM

## 2025-06-19 RX ORDER — PHENYLEPHRINE HYDROCHLORIDE 25 MG/ML
1 SOLUTION/ DROPS OPHTHALMIC
Status: COMPLETED | OUTPATIENT
Start: 2025-06-19 | End: 2025-06-19

## 2025-06-19 RX ORDER — DICLOFENAC SODIUM 1 MG/ML
1 SOLUTION/ DROPS OPHTHALMIC
Status: COMPLETED | OUTPATIENT
Start: 2025-06-19 | End: 2025-06-19

## 2025-06-19 RX ADMIN — TROPICAMIDE 1 DROP: 10 SOLUTION/ DROPS OPHTHALMIC at 11:39

## 2025-06-19 RX ADMIN — PROPARACAINE HYDROCHLORIDE 1 DROP: 5 SOLUTION/ DROPS OPHTHALMIC at 11:21

## 2025-06-19 RX ADMIN — MOXIFLOXACIN HYDROCHLORIDE 1 DROP: 5 SOLUTION/ DROPS OPHTHALMIC at 11:38

## 2025-06-19 RX ADMIN — DICLOFENAC SODIUM 1 DROP: 1 SOLUTION OPHTHALMIC at 11:36

## 2025-06-19 RX ADMIN — DICLOFENAC SODIUM 1 DROP: 1 SOLUTION OPHTHALMIC at 11:30

## 2025-06-19 RX ADMIN — PHENYLEPHRINE HYDROCHLORIDE 1 DROP: 25 SOLUTION/ DROPS OPHTHALMIC at 11:43

## 2025-06-19 RX ADMIN — TROPICAMIDE 1 DROP: 10 SOLUTION/ DROPS OPHTHALMIC at 11:26

## 2025-06-19 RX ADMIN — PHENYLEPHRINE HYDROCHLORIDE 1 DROP: 25 SOLUTION/ DROPS OPHTHALMIC at 11:37

## 2025-06-19 RX ADMIN — TROPICAMIDE 1 DROP: 10 SOLUTION/ DROPS OPHTHALMIC at 11:35

## 2025-06-19 RX ADMIN — LIDOCAINE HYDROCHLORIDE 0.1 ML: 10 INJECTION, SOLUTION EPIDURAL; INFILTRATION; INTRACAUDAL; PERINEURAL at 11:29

## 2025-06-19 RX ADMIN — MOXIFLOXACIN HYDROCHLORIDE 1 DROP: 5 SOLUTION/ DROPS OPHTHALMIC at 11:44

## 2025-06-19 RX ADMIN — MOXIFLOXACIN HYDROCHLORIDE 1 DROP: 5 SOLUTION/ DROPS OPHTHALMIC at 11:32

## 2025-06-19 RX ADMIN — PHENYLEPHRINE HYDROCHLORIDE 1 DROP: 25 SOLUTION/ DROPS OPHTHALMIC at 11:31

## 2025-06-19 RX ADMIN — MIDAZOLAM 1 MG: 1 INJECTION INTRAMUSCULAR; INTRAVENOUS at 11:58

## 2025-06-19 RX ADMIN — DICLOFENAC SODIUM 1 DROP: 1 SOLUTION OPHTHALMIC at 11:42

## 2025-06-19 ASSESSMENT — ACTIVITIES OF DAILY LIVING (ADL)
ADLS_ACUITY_SCORE: 41

## 2025-06-19 ASSESSMENT — LIFESTYLE VARIABLES: TOBACCO_USE: 1

## 2025-06-19 NOTE — ANESTHESIA POSTPROCEDURE EVALUATION
Patient: Christiana Lopes    Procedure: Procedure(s):  Phacoemulsification with standard intraocular lens implant       Anesthesia Type:  MAC    Note:  Disposition: Outpatient   Postop Pain Control: Uneventful            Sign Out: Well controlled pain   PONV: No   Neuro/Psych: Uneventful            Sign Out: Acceptable/Baseline neuro status   Airway/Respiratory: Uneventful            Sign Out: Acceptable/Baseline resp. status   CV/Hemodynamics: Uneventful            Sign Out: Acceptable CV status   Other NRE: NONE   DID A NON-ROUTINE EVENT OCCUR? No    Event details/Postop Comments:  Pt was happy with anesthesia care.  No complications.  I will follow up with the pt if needed.           Last vitals:  Vitals Value Taken Time   /77 06/19/25 13:00   Temp     Pulse 53 06/19/25 13:00   Resp     SpO2 98 % 06/19/25 13:05   Vitals shown include unfiled device data.    Electronically Signed By: LOC Lima CRNA  June 19, 2025  1:07 PM

## 2025-06-19 NOTE — ANESTHESIA PREPROCEDURE EVALUATION
Anesthesia Pre-Procedure Evaluation    Patient: Christiana Lopes   MRN: [id number] : 1955          Procedure : Procedure(s):  Phacoemulsification with standard intraocular lens implant         Past Medical History:   Diagnosis Date    Arthritis     ostio    Cervix cancer (H) 1990    Depression     after 's death    History of left breast biopsy 2011    negative    HTN     Nonsenile cataract     UTI     history of       Past Surgical History:   Procedure Laterality Date    ARTHROPLASTY SHOULDER Left 2020    Procedure: Left shoulder hemiarthroplasy with bicep tenodesis;  Surgeon: Nura Cole MD;  Location: [facility] OR    ARTHROSCOPY SHOULDER Left 2020    Procedure: left shoulder arthroscopy;  Surgeon: Nura Cole MD;  Location:  OR    BACK SURGERY  2009    BIOPSY BREAST  2011    benign    BLEPHAROPLASTY Bilateral 2022    Procedure: BLEPHAROPLASTY;  Surgeon: Lico Ma MD;  Location: [facility] OR    C/SECTION, CLASSICAL      x2    COLONOSCOPY  2005    COLONOSCOPY N/A 2018    Procedure: COLONOSCOPY;  COLONOSCOPY;  Surgeon: Ken Fair MD;  Location:  GI    GENITOURINARY SURGERY      Bladder sling    HEAD & NECK SURGERY  2011    HERNIA REPAIR  2003    HYSTERECTOMY  1990    one ovary removed, h/o cervical cancer    PHACOEMULSIFICATION WITH STANDARD INTRAOCULAR LENS IMPLANT Left 2025    Procedure: Phacoemulsification with standard intraocular lens implant Left Eye;  Surgeon: Howie Mendoza MD;  Location:  OR    REPAIR PTOSIS      SLING TRANSVAGINAL N/A 05/15/2023    Procedure: Pubovaginal sling with Altis;  Surgeon: Ken Murray MD;  Location: [facility] OR    ZZC APPENDECTOMY      ZZC ENLARGE BREAST  2006    ZZC NONSPECIFIC PROCEDURE  2004    ventral hernia repair    ZZC NONSPECIFIC PROCEDURE  2011    Lumbar fusion L5-S1    ZZC NONSPECIFIC PROCEDURE      cervical fusion C3       No Known Allergies   Social History     Tobacco Use    Smoking status: Former     Current packs/day: 0.00     Average packs/day: 1 pack/day for 2.0 years (2.0 ttl pk-yrs)     Types: Cigarettes     Start date: 2008     Quit date: 2010     Years since quittin.8     Passive exposure: Never    Smokeless tobacco: Never    Tobacco comments:     no use of tobacco products   Substance Use Topics    Alcohol use: Yes     Comment: Socially      Wt Readings from Last 1 Encounters:   25 64.4 kg (142 lb)        Anesthesia Evaluation   Pt has had prior anesthetic. Type: General and MAC.        ROS/MED HX  ENT/Pulmonary:  - neg pulmonary ROS   (+)                tobacco use, Past use,                       Neurologic:  - neg neurologic ROS     Cardiovascular:     (+)  hypertension- -   -  - -                                 Previous cardiac testing   Echo: Date: Results:    Stress Test:  Date: 3/13/19 Results:  This was a normal stress echocardiogram with no evidence of stress-induced  ischemia.  Good exercise tolernance ( 10:32 Tony protocol / 12/5 Mets)     Despite developing 1.5 mm asymptomatic inferolateral ST segment depression,  the patient exhibited a normal echocardiographic response to exercise ( all  wall mtion improved, estimated global LV systolic performance is hyperdynamic  and end systolic volumes decrease)  _____________________________________________________________________________  __     Stress  Exercise was stopped due to dyspnea.  There was a normal BP response to exercise.  Target Heart Rate was achieved.  There was a maximum 1.5mm ST segment depression in the inferior lead(s).  There was a maximum 1.5mm ST segment depression in the lateral lead(s).  This was a normal stress echocardiogram with no evidence of stress-induced  ischemia.  The visual ejection fraction is estimated at >70%.  Global LV systolic function augments with exercise.  Left ventricular cavity size decreases with  "exercise.  Normal resting wall motion and no stress-induced wall motion abnormality.     Baseline  The patient is in normal sinus rhythm.  Resting ECG is normal.  The visual ejection fraction is estimated at 55-60%.  No regional wall motion abnormalities noted.    ECG Reviewed:  Date: 3/13/19 Results:  SR  Cath:  Date: Results:      METS/Exercise Tolerance:     Hematologic:  - neg hematologic  ROS     Musculoskeletal:   (+)  arthritis,             GI/Hepatic:  - neg GI/hepatic ROS     Renal/Genitourinary:  - neg Renal ROS     Endo:       Psychiatric/Substance Use:     (+) psychiatric history depression       Infectious Disease:  - neg infectious disease ROS     Malignancy: Comment: Cervical   (+) Malignancy, History of Other.Other CA status post Surgery.    Other:              Physical Exam  Airway  Mallampati: II  TM distance: >3 FB  Neck ROM: full  Mouth opening: >= 4 cm    Cardiovascular - normal exam  Rhythm: regular  Rate: normal rate     Dental   (+) Completely normal teeth      Pulmonary - normal examBreath sounds clear to auscultation        Neurological - normal exam  She appears awake, alert and oriented x3.    Other Findings       OUTSIDE LABS:  CBC:   Lab Results   Component Value Date    WBC 6.0 11/19/2024    WBC 5.5 01/11/2024    HGB 12.1 11/19/2024    HGB 12.6 01/11/2024    HCT 36.5 11/19/2024    HCT 38.5 01/11/2024     11/19/2024     01/11/2024     BMP:   Lab Results   Component Value Date     05/20/2025     11/19/2024    POTASSIUM 4.3 05/20/2025    POTASSIUM 3.9 11/19/2024    CHLORIDE 101 05/20/2025    CHLORIDE 104 11/19/2024    CO2 29 05/20/2025    CO2 26 11/19/2024    BUN 22.7 05/20/2025    BUN 21.9 11/19/2024    CR 0.96 (H) 05/20/2025    CR 1.07 (H) 11/19/2024    GLC 80 05/20/2025    GLC 88 11/19/2024     COAGS: No results found for: \"PTT\", \"INR\", \"FIBR\"  POC: No results found for: \"BGM\", \"HCG\", \"HCGS\"  HEPATIC:   Lab Results   Component Value Date    ALBUMIN 4.5 " "04/16/2024    PROTTOTAL 7.4 04/16/2024    ALT 19 04/16/2024    AST 23 04/16/2024    ALKPHOS 42 04/16/2024    BILITOTAL 0.2 04/16/2024     OTHER:   Lab Results   Component Value Date    JERRI 10.2 05/20/2025    PHOS 3.4 10/27/2009    MAG 2.1 11/19/2024    TSH 2.10 11/19/2024    CRP <5.0 07/08/2014    SED 9 07/08/2014       Anesthesia Plan    ASA Status:  2      NPO Status: NPO Appropriate   Anesthesia Type: MAC.  Airway: natural airway.  Induction: intravenous.  Maintenance: TIVA.   Techniques and Equipment:       - Monitoring Plan: standard ASA monitoring     Consents    Anesthesia Plan(s) and associated risks, benefits, and realistic alternatives discussed. Questions answered and patient/representative(s) expressed understanding.     - Discussed: surgeon     - Discussed with:  Patient        - Pt is DNR/DNI Status: no DNR     Blood Consent:      - Discussed with: patient.     - Consented: consented to blood products     Postoperative Care    Pain management: non-narcotic analgesics.     Comments:    Other Comments: The risks and benefits of anesthesia, and the alternatives where applicable, have been discussed with the patient, and they wish to proceed.                  Simon Walter, APRN CRNA    I have reviewed the pertinent notes and labs in the chart from the past 30 days and (re)examined the patient.  Any updates or changes from those notes are reflected in this note.    Clinically Significant Risk Factors Present on Admission                   # Hypertension: Noted on problem list           # Overweight: Estimated body mass index is 26.83 kg/m  as calculated from the following:    Height as of 5/20/25: 1.549 m (5' 1\").    Weight as of 5/20/25: 64.4 kg (142 lb).                    "

## 2025-06-19 NOTE — OP NOTE
[facility]  Ophthalmology Operative Note    PREOPERATIVE DIAGNOSIS: Cataract, Right eye.     POSTOPERATIVE DIAGNOSIS: Cataract, Right eye.     OPERATION: Cataract extraction with placement of posterior chamber intraocular lens in the Right eye.     ANESTHESIA: MAC combined with topical     INDICATIONS FOR PROCEDURE: Christiana Lopes was seen in the [facility] for decreased visual acuity in the Right eye. The patient was found to have a visually significant cataract in the Right eye. The risks, benefits, alternatives and goals of cataract extraction were discussed with the patient, and after adequate discussion the patient understood and agreed to these, and a signed informed consent was obtained prior to the procedure.     DESCRIPTION OF PROCEDURE: After proper patient identification, topical anesthesia was applied to the Right eye. The patient was brought to the operating room and the Right eye was prepped and draped in the usual sterile fashion for intraocular surgery. A lid speculum was placed in the Right eye. A paracentesis was then created and the anterior chamber was filled with 1% non-preserved lidocaine followed by Viscoat. A clear corneal incision was then created temporally using a 2.4mm keratome. A continuous curvilinear capsulorrhexis was then created using a cystotome and Utrata forceps. Hydrodissection was carried out with BSS on a Fofana cannula and the lens rotated freely within the capsular bag. Phacoemulsification was then carried out using the stop and chop technique. Residual cortical material was removed using the I&A handpiece. The capsular bag was then filled with Provisc and a ZCB00 +20.5 diopter intraocular lens was then injected into the capsular bag. The lens showed good centration and stability. Residual viscoelastic was removed using the I&A handpiece. The wound was then hydrated and the anterior chamber reformed. Intracameral Moxifloxacin was  then injected into the anterior chamber. The wounds were then checked and found to be sealed. Topical Prednisolone drops were placed in the patient's Right eye followed by a Stanley shield over the top of this. The patient tolerated the procedure well without complications and was told to follow up in the clinic in the next postoperative day.     Implant Name Type Inv. Item Serial No.  Lot No. LRB No. Used Action   EYE IMP IOL ROLAN PCL TECNIS ZCB00 20.5 - [id number] Lens/Eye Implant EYE IMP IOL ROLAN PCL TECNIS ZCB00 20.5 [id number] [employer]  Right 1 Implanted        Howie Mendoza MD

## 2025-06-19 NOTE — ANESTHESIA POSTPROCEDURE EVALUATION
Patient: Christiana Lopes    Procedure: Procedure(s):  Phacoemulsification with standard intraocular lens implant       Anesthesia Type:  MAC    Note:  Disposition: Outpatient   Postop Pain Control: Uneventful            Sign Out: Well controlled pain   PONV: No   Neuro/Psych: Uneventful            Sign Out: Acceptable/Baseline neuro status   Airway/Respiratory: Uneventful            Sign Out: Acceptable/Baseline resp. status   CV/Hemodynamics: Uneventful            Sign Out: Acceptable CV status   Other NRE: NONE   DID A NON-ROUTINE EVENT OCCUR? No    Event details/Postop Comments:  Pt was happy with anesthesia care.  No complications.  I will follow up with the pt if needed.       Last vitals:  Vitals Value Taken Time   /77 06/19/25 13:00   Temp     Pulse 53 06/19/25 13:00   Resp     SpO2 98 % 06/19/25 13:05   Vitals shown include unfiled device data.    Electronically Signed By: LOC Haw[place] CRNA  June 19, 2025  1:07 PM

## 2025-06-19 NOTE — ANESTHESIA CARE TRANSFER NOTE
Patient: Christiana Lopes    Procedure: Procedure(s):  Phacoemulsification with standard intraocular lens implant       Diagnosis: Cataract [H26.9]  Diagnosis Additional Information: No value filed.    Anesthesia Type:   MAC     Note:    Oropharynx: oropharynx clear of all foreign objects and spontaneously breathing  Level of Consciousness: awake  Oxygen Supplementation: room air    Independent Airway: airway patency satisfactory and stable  Dentition: dentition unchanged  Vital Signs Stable: post-procedure vital signs reviewed and stable  Report to RN Given: handoff report given  Patient transferred to: Phase II    Handoff Report: Identifed the Patient, Identified the Reponsible Provider, Reviewed the pertinent medical history, Discussed the surgical course, Reviewed Intra-OP anesthesia mangement and issues during anesthesia, Set expectations for post-procedure period and Allowed opportunity for questions and acknowledgement of understanding      Vitals:  Vitals Value Taken Time   /77 06/19/25 13:00   Temp     Pulse 53 06/19/25 13:00   Resp     SpO2 98 % 06/19/25 13:05   Vitals shown include unfiled device data.    Electronically Signed By: LOC Hawthorne CRNA  June 19, 2025  1:07 PM

## 2025-07-01 ENCOUNTER — MYC REFILL (OUTPATIENT)
Dept: FAMILY MEDICINE | Facility: OTHER | Age: 70
End: 2025-07-01
Payer: COMMERCIAL

## 2025-07-01 DIAGNOSIS — N95.1 MENOPAUSAL SYNDROME (HOT FLASHES): ICD-10-CM

## 2025-07-01 RX ORDER — ESTRADIOL 0.5 MG/1
0.5 TABLET ORAL DAILY
Qty: 90 TABLET | Refills: 3 | Status: SHIPPED | OUTPATIENT
Start: 2025-07-01

## 2025-07-01 RX ORDER — ESTRADIOL 0.5 MG/1
0.5 TABLET ORAL DAILY
Qty: 90 TABLET | Refills: 3 | OUTPATIENT
Start: 2025-07-01

## 2025-07-13 ENCOUNTER — HEALTH MAINTENANCE LETTER (OUTPATIENT)
Age: 70
End: 2025-07-13

## (undated) DEVICE — SU PROLENE 0 CT-1 30" 8424H

## (undated) DEVICE — SUCTION IRR SYSTEM W/O TIP INTERPULSE HANDPIECE 0210-100-000

## (undated) DEVICE — PREP CHLORAPREP 26ML TINTED ORANGE  260815

## (undated) DEVICE — SUCTION TIP YANKAUER W/O VENT K86

## (undated) DEVICE — GLOVE ORTHO 7.5 LATEX 5721314

## (undated) DEVICE — DRAPE POUCH IRR 1016

## (undated) DEVICE — GLOVE PROTEGRITY 7.5 LATEX

## (undated) DEVICE — DRAPE CONVERTORS U-DRAPE 60X72" 8476

## (undated) DEVICE — SU FIBERWIRE 2 38" T-8 NDL  AR-7206

## (undated) DEVICE — SOL WATER IRRIG 1000ML BOTTLE 07139-09

## (undated) DEVICE — NDL SPINAL 18GA 3.5" 405184

## (undated) DEVICE — DEVICE RETRIEVER HEWSON 71111579

## (undated) DEVICE — SPONGE PACK VAGINAL 2"X9

## (undated) DEVICE — SOL NACL 0.9% IRRIG 1000ML BOTTLE 07138-09

## (undated) DEVICE — DRSG ABDOMINAL 07 1/2X8" 7197D

## (undated) DEVICE — BLADE KNIFE SURG 15 371115

## (undated) DEVICE — PACK SET-UP STD 9102

## (undated) DEVICE — ESU ELEC NDL 1" COATED/INSULATED E1465

## (undated) DEVICE — MARKER SKIN DOUBLE TIP W/FLEXI-RULER W/LABELS

## (undated) DEVICE — SYR BULB IRRIG DOVER 60 ML LATEX FREE 67000

## (undated) DEVICE — DEVICE PASSER MED

## (undated) DEVICE — DRAPE IOBAN INCISE 36X23" 6651EZ

## (undated) DEVICE — TUBING SUCTION 12"X1/4" N612

## (undated) DEVICE — PREP SKIN SCRUB TRAY 4461A

## (undated) DEVICE — SU FIBERWIRE 2 38"  AR-7200

## (undated) DEVICE — PACK MINOR SBA15MIFSE

## (undated) DEVICE — SYR 03ML LL W/O NDL

## (undated) DEVICE — PACK MINOR EYE

## (undated) DEVICE — BLADE KNIFE SURG 10 371110

## (undated) DEVICE — GLOVE PROTEXIS W/NEU-THERA 7.5  2D73TE75

## (undated) DEVICE — Device

## (undated) DEVICE — RETR RING LONE STAR 14.1X14.1CM 3307G

## (undated) DEVICE — SOL NACL 0.9% IRRIG 3000ML BAG 07972-08

## (undated) DEVICE — BONE CEMENT MIXEVAC III HI VAC KIT  0206-015-000

## (undated) DEVICE — GLOVE BIOGEL PI ULTRATOUCH G SZ 8.0 42180

## (undated) DEVICE — SU NDL MAYO TROCAR MED 217003

## (undated) DEVICE — SU COBRAID ULTRABRAID 2 38" 7210915

## (undated) DEVICE — ESU EYE HIGH TEMP 65410-183

## (undated) DEVICE — NDL 19GA 1.5"

## (undated) DEVICE — DECANTER TRANSFER DEVICE 2008S

## (undated) DEVICE — GOWN IMPERVIOUS SPECIALTY XL/XLONG 39049

## (undated) DEVICE — DRAPE VAGI BAG 18X9" 1072

## (undated) DEVICE — CATH FOLEY 18FR 5ML SILICONE LUBRI-SIL 175818

## (undated) DEVICE — IMM KIT SHOULDER TMAX MASK FACE 7210559

## (undated) DEVICE — TAPE MEDIFIX 4"

## (undated) DEVICE — BONE CLEANING TIP INTERPULSE  0210-010-000

## (undated) DEVICE — TUBING IRRIG TUR Y TYPE 96" LF 6543-01

## (undated) DEVICE — RETR ELASTIC STAYS 3311-1G

## (undated) DEVICE — SYR 10ML LL W/O NDL

## (undated) DEVICE — PAD PERI W/WINGS 1580A

## (undated) DEVICE — SU VICRYL 0 CP-1 27" UND J267H

## (undated) DEVICE — DRAPE U SPLIT 74X120" 29440

## (undated) DEVICE — DRAPE GYN/UROLOGY FLUID POUCH TUR 29455

## (undated) DEVICE — HOOD FLYTE 0408-800-000

## (undated) DEVICE — PACK ARTHROSCOPY KNEE LATEX FREE SOP32CAFCN

## (undated) DEVICE — BLADE SAW SAGITTAL STRK 18X90X1.19MM HD SYS 6 6118-119-090

## (undated) DEVICE — DRAPE MAYO STAND 23X54 8337

## (undated) DEVICE — DRAPE STERI TOWEL SM 1000

## (undated) DEVICE — SU VICRYL 2-0 UR-5 27" J375H

## (undated) DEVICE — ESU PENCIL SMOKE EVAC W/ROCKER SWITCH 0703-047-000

## (undated) DEVICE — SU PDS II 0 CT-1 36" Z346H

## (undated) DEVICE — NDL 30GA 0.5" 305106

## (undated) DEVICE — SPONGE LAP 18X18" 1515

## (undated) DEVICE — ESU BIPOLAR SEALER AQUAMANTYS 6MM 23-112-1

## (undated) DEVICE — SYR 50ML CATH TIP W/O NDL 309620

## (undated) DEVICE — PACK OPEN SHOULDER SOP15OCFSC

## (undated) RX ORDER — CEFAZOLIN SODIUM 1 G/3ML
INJECTION, POWDER, FOR SOLUTION INTRAMUSCULAR; INTRAVENOUS
Status: DISPENSED
Start: 2023-05-15

## (undated) RX ORDER — PROPOFOL 10 MG/ML
INJECTION, EMULSION INTRAVENOUS
Status: DISPENSED
Start: 2023-05-15

## (undated) RX ORDER — ACETAMINOPHEN 325 MG/1
TABLET ORAL
Status: DISPENSED
Start: 2023-05-15

## (undated) RX ORDER — LIDOCAINE HYDROCHLORIDE 10 MG/ML
INJECTION, SOLUTION EPIDURAL; INFILTRATION; INTRACAUDAL; PERINEURAL
Status: DISPENSED
Start: 2018-02-12

## (undated) RX ORDER — LIDOCAINE HYDROCHLORIDE 20 MG/ML
INJECTION, SOLUTION EPIDURAL; INFILTRATION; INTRACAUDAL; PERINEURAL
Status: DISPENSED
Start: 2020-02-12

## (undated) RX ORDER — DEXAMETHASONE SODIUM PHOSPHATE 4 MG/ML
INJECTION, SOLUTION INTRA-ARTICULAR; INTRALESIONAL; INTRAMUSCULAR; INTRAVENOUS; SOFT TISSUE
Status: DISPENSED
Start: 2023-05-15

## (undated) RX ORDER — PROPOFOL 10 MG/ML
INJECTION, EMULSION INTRAVENOUS
Status: DISPENSED
Start: 2022-04-18

## (undated) RX ORDER — BACITRACIN 50000 [IU]/1
INJECTION, POWDER, FOR SOLUTION INTRAMUSCULAR
Status: DISPENSED
Start: 2020-02-12

## (undated) RX ORDER — ACETAMINOPHEN 325 MG/1
TABLET ORAL
Status: DISPENSED
Start: 2022-04-18

## (undated) RX ORDER — HYDROMORPHONE HYDROCHLORIDE 1 MG/ML
INJECTION, SOLUTION INTRAMUSCULAR; INTRAVENOUS; SUBCUTANEOUS
Status: DISPENSED
Start: 2020-02-12

## (undated) RX ORDER — ONDANSETRON 2 MG/ML
INJECTION INTRAMUSCULAR; INTRAVENOUS
Status: DISPENSED
Start: 2020-02-12

## (undated) RX ORDER — DEXAMETHASONE SODIUM PHOSPHATE 10 MG/ML
INJECTION, SOLUTION INTRAMUSCULAR; INTRAVENOUS
Status: DISPENSED
Start: 2020-02-12

## (undated) RX ORDER — FENTANYL CITRATE 50 UG/ML
INJECTION, SOLUTION INTRAMUSCULAR; INTRAVENOUS
Status: DISPENSED
Start: 2020-02-12

## (undated) RX ORDER — ONDANSETRON 2 MG/ML
INJECTION INTRAMUSCULAR; INTRAVENOUS
Status: DISPENSED
Start: 2023-05-15

## (undated) RX ORDER — FENTANYL CITRATE 50 UG/ML
INJECTION, SOLUTION INTRAMUSCULAR; INTRAVENOUS
Status: DISPENSED
Start: 2023-05-15

## (undated) RX ORDER — PROPOFOL 10 MG/ML
INJECTION, EMULSION INTRAVENOUS
Status: DISPENSED
Start: 2020-02-12

## (undated) RX ORDER — FENTANYL CITRATE 50 UG/ML
INJECTION, SOLUTION INTRAMUSCULAR; INTRAVENOUS
Status: DISPENSED
Start: 2018-02-12